# Patient Record
Sex: MALE | Race: WHITE | NOT HISPANIC OR LATINO | Employment: OTHER | URBAN - METROPOLITAN AREA
[De-identification: names, ages, dates, MRNs, and addresses within clinical notes are randomized per-mention and may not be internally consistent; named-entity substitution may affect disease eponyms.]

---

## 2017-01-06 ENCOUNTER — OFFICE VISIT (OUTPATIENT)
Dept: FAMILY MEDICINE CLINIC | Facility: CLINIC | Age: 77
End: 2017-01-06

## 2017-01-06 VITALS
HEIGHT: 71 IN | WEIGHT: 234 LBS | DIASTOLIC BLOOD PRESSURE: 90 MMHG | SYSTOLIC BLOOD PRESSURE: 150 MMHG | OXYGEN SATURATION: 98 % | RESPIRATION RATE: 16 BRPM | BODY MASS INDEX: 32.76 KG/M2 | HEART RATE: 72 BPM | TEMPERATURE: 98.1 F

## 2017-01-06 DIAGNOSIS — I10 ESSENTIAL HYPERTENSION: ICD-10-CM

## 2017-01-06 DIAGNOSIS — G47.00 INSOMNIA, UNSPECIFIED TYPE: Primary | ICD-10-CM

## 2017-01-06 DIAGNOSIS — E78.5 HYPERLIPIDEMIA, UNSPECIFIED HYPERLIPIDEMIA TYPE: ICD-10-CM

## 2017-01-06 PROCEDURE — 99214 OFFICE O/P EST MOD 30 MIN: CPT | Performed by: FAMILY MEDICINE

## 2017-01-06 RX ORDER — ZOLPIDEM TARTRATE 5 MG/1
5 TABLET ORAL NIGHTLY PRN
Qty: 30 TABLET | Refills: 0 | Status: SHIPPED | OUTPATIENT
Start: 2017-01-06 | End: 2017-02-08

## 2017-01-06 NOTE — MR AVS SNAPSHOT
Bernard Resendez   1/6/2017 2:00 PM   Office Visit    Dept Phone:  [phone]   Encounter #:  [id number]    Provider:  Laci Mcnair MD   Department:  [facility]                Your Full Care Plan              Today's Medication Changes          These changes are accurate as of: 1/6/17  2:54 PM.  If you have any questions, ask your nurse or doctor.               Medication(s)that have changed:     zolpidem 5 MG tablet   Commonly known as:  AMBIEN   Take 1 tablet by mouth At Night As Needed for sleep.   What changed:    - medication strength  - how much to take   Changed by:  Laci Mcnair MD         Stop taking medication(s)listed here:     ascorbic acid 1000 MG tablet   Commonly known as:  VITAMIN C   Stopped by:  Laci Mcnair MD           balsalazide 750 MG capsule   Commonly known as:  COLAZAL   Stopped by:  Laci Mcnair MD           traZODone 50 MG tablet   Commonly known as:  DESYREL   Stopped by:  Laci Mcnair MD                Where to Get Your Medications      You can get these medications from any pharmacy     Bring a paper prescription for each of these medications     zolpidem 5 MG tablet                  Your Updated Medication List          This list is accurate as of: 1/6/17  2:54 PM.  Always use your most recent med list.                atenolol 50 MG tablet   Commonly known as:  TENORMIN   Take 1 tablet by mouth Daily.       CALCIUM 500 PO       CENTRUM SILVER ADULT 50+ PO       fish oil 1200 MG capsule capsule       fluocinolone 0.025 % cream   Commonly known as:  SYNALAR       lisinopril 40 MG tablet   Commonly known as:  PRINIVIL,ZESTRIL   Take 1 tablet by mouth Daily.       simvastatin 40 MG tablet   Commonly known as:  ZOCOR       vitamin B-12 1000 MCG tablet   Commonly known as:  CYANOCOBALAMIN       Vitamin D3 1000 UNITS capsule       zolpidem 5 MG tablet   Commonly known as:  AMBIEN   Take 1 tablet by mouth At Night As Needed for  sleep.               You Were Diagnosed With        Codes Comments    Insomnia, unspecified type    -  Primary ICD-10-CM: G47.00  ICD-9-CM: 780.52     Hyperlipidemia, unspecified hyperlipidemia type     ICD-10-CM: E78.5  ICD-9-CM: 272.4     Essential hypertension     ICD-10-CM: I10  ICD-9-CM: 401.9       Instructions     None    Patient Instructions History      Upcoming Appointments     Visit Type Date Time Department    [facility] 2017  2:00 PM [facility]    [facility]ICE VISIT 2017  1:40 PM [facility]      [employer] Signup     [facility] [employer] allows you to send messages to your doctor, view your test results, renew your prescriptions, schedule appointments, and more. To sign up, go to [employer] and click on the Sign Up Now link in the New User? box. Enter your [employer] Activation Code exactly as it appears below along with the last four digits of your Social Security Number and your Date of Birth () to complete the sign-up process. If you do not sign up before the expiration date, you must request a new code.    [employer] Activation Code: [email or web address]  Expires: 2017  2:52 PM    If you have questions, you can email [email or web address] or call [phone] to talk to our [employer] staff. Remember, [employer] is NOT to be used for urgent needs. For medical emergencies, dial 911.               Other Info from Your Visit           Your Appointments     2017  1:40 PM EST   Office Visit with Laci Mcnair MD   [facility] (--)    [address] [address] [phone]           Arrive 15 minutes prior to appointment.              Allergies     Ciprofloxacin        Reason for Visit     Establish Care Pt here to establish care, transferring from Dr. Rae office. Needs medication refills      Vital Signs     Blood Pressure Pulse Temperature Respirations Height Weight    150/90 72 98.1 °F (36.7  "°C) (Oral) 16 71\" (180.3 cm) 234 lb (106 kg)    Oxygen Saturation Body Mass Index Smoking Status             98% 32.64 kg/m2 Former Smoker         Problems and Diagnoses Noted     High cholesterol or triglycerides    High blood pressure    Difficulty falling or staying asleep        "

## 2017-01-06 NOTE — PROGRESS NOTES
Subjective   Bernard Resendez is a 76 y.o. male.     Chief Complaint   Patient presents with   • Establish Care     Pt here to establish care, transferring from Dr. Rae office. Needs medication refills       HPI     Insomnia: Patient states he's been taking zolpidem 10 mg nightly for about one year.  States that he tolerates medications well and has no adverse side effects.  States that he has tried trazodone and melatonin in the past with limited results.  He has visited a sleep medicine specialist. He states that his room is quite dark and comfortable.  He tries to avoid screening time before going to sleep.    Hypertension: Stable and well controlled on lisinopril and atenolol.  No adverse side effects noted.  Diet and exercise are at goal.    Hyperlipidemia: Stable and well controlled on simvastatin 40 mg.  No adverse side effects noted.    The following portions of the patient's history were reviewed and updated as appropriate: allergies, current medications, past family history, past medical history, past social history, past surgical history and problem list.    Review of Systems   All other systems reviewed and are negative.      Objective  Vitals:    01/06/17 1405   BP: 150/90   Pulse: 72   Resp: 16   Temp: 98.1 °F (36.7 °C)   SpO2: 98%        Physical Exam   Constitutional: He is oriented to person, place, and time. He appears well-developed and well-nourished. No distress.   HENT:   Head: Normocephalic and atraumatic.   Right Ear: External ear normal.   Left Ear: External ear normal.   Nose: Nose normal.   Mouth/Throat: Oropharynx is clear and moist.   Eyes: Conjunctivae and EOM are normal. Pupils are equal, round, and reactive to light. Right eye exhibits no discharge. Left eye exhibits no discharge. No scleral icterus.   Neck: Normal range of motion. Neck supple.   Cardiovascular: Normal rate, regular rhythm and normal heart sounds.  Exam reveals no friction rub.    No murmur heard.  Pulmonary/Chest:  Effort normal and breath sounds normal. No respiratory distress. He has no wheezes. He has no rales.   Abdominal: Soft. Bowel sounds are normal. He exhibits no distension. There is no tenderness. There is no rebound and no guarding.   Lymphadenopathy:     He has no cervical adenopathy.   Neurological: He is alert and oriented to person, place, and time.   Skin: Skin is warm and dry. He is not diaphoretic.   Nursing note and vitals reviewed.        Current Outpatient Prescriptions:   •  atenolol (TENORMIN) 50 MG tablet, Take 1 tablet by mouth Daily., Disp: 90 tablet, Rfl: 1  •  Calcium-Magnesium-Vitamin D (CALCIUM 500 PO), Take 2 tablets by mouth daily., Disp: , Rfl:   •  Cholecalciferol (VITAMIN D3) 1000 UNITS capsule, Take by mouth daily., Disp: , Rfl:   •  fluocinolone (SYNALAR) 0.025 % cream, , Disp: , Rfl:   •  lisinopril (PRINIVIL,ZESTRIL) 40 MG tablet, Take 1 tablet by mouth Daily., Disp: 90 tablet, Rfl: 1  •  Multiple Vitamins-Minerals (CENTRUM SILVER ADULT 50+ PO), Take by mouth., Disp: , Rfl:   •  Omega-3 Fatty Acids (FISH OIL) 1200 MG capsule capsule, Take by mouth., Disp: , Rfl:   •  simvastatin (ZOCOR) 40 MG tablet, , Disp: , Rfl:   •  vitamin B-12 (CYANOCOBALAMIN) 1000 MCG tablet, Take by mouth., Disp: , Rfl:   •  zolpidem (AMBIEN) 5 MG tablet, Take 1 tablet by mouth At Night As Needed for sleep., Disp: 30 tablet, Rfl: 0    Procedures    Lab Results (most recent)     None          Assessment/Plan   Bernard was seen today for establish care.    Diagnoses and all orders for this visit:    Insomnia, unspecified type  -     zolpidem (AMBIEN) 5 MG tablet; Take 1 tablet by mouth At Night As Needed for sleep.    Hyperlipidemia, unspecified hyperlipidemia type    Essential hypertension    Extensive conversation had with the patient regarding the risks of zolpidem at the 10 mg dose.  I advised the patient that 10 mg is an appropriate dose for his age.  Patient will try 5 mg in combination with melatonin 10 mg.   Advised patient that this up over-the-counter. A BLACK was run which showed no abnormalities.  A controlled substance contract was reviewed and signed.    Hypertension and hyperlipidemia are currently stable.  Continue current medical regimen.    Return in about 4 weeks (around 2/3/2017) for Recheck.      Laci Mcnair MD

## 2017-02-06 ENCOUNTER — OFFICE VISIT (OUTPATIENT)
Dept: FAMILY MEDICINE CLINIC | Facility: CLINIC | Age: 77
End: 2017-02-06

## 2017-02-06 VITALS
BODY MASS INDEX: 32.62 KG/M2 | HEIGHT: 71 IN | SYSTOLIC BLOOD PRESSURE: 142 MMHG | WEIGHT: 233 LBS | HEART RATE: 56 BPM | TEMPERATURE: 98.2 F | DIASTOLIC BLOOD PRESSURE: 90 MMHG | RESPIRATION RATE: 17 BRPM | OXYGEN SATURATION: 98 %

## 2017-02-06 DIAGNOSIS — E78.5 HYPERLIPIDEMIA, UNSPECIFIED HYPERLIPIDEMIA TYPE: Primary | ICD-10-CM

## 2017-02-06 DIAGNOSIS — G47.00 INSOMNIA, UNSPECIFIED TYPE: ICD-10-CM

## 2017-02-06 PROCEDURE — 99214 OFFICE O/P EST MOD 30 MIN: CPT | Performed by: FAMILY MEDICINE

## 2017-02-06 RX ORDER — BALSALAZIDE DISODIUM 750 MG/1
CAPSULE ORAL
Qty: 270 CAPSULE | Refills: 10 | OUTPATIENT
Start: 2017-02-06

## 2017-02-06 NOTE — PROGRESS NOTES
Subjective   Bernard Resendez is a 76 y.o. male.     Chief Complaint   Patient presents with   • Follow-up     one month       HPI     Patient here to follow-up on insomnia.  At her last visit patient was decreased from 10 mg to 5 mg of Ambien.  He was also started on melatonin supplement.  He states that the first week was actually pretty rough as far as getting a good night's sleep.  After about a week he did start to have better sleep.  He states now that he'll sleep for maybe 4-5 hours and then wake up.  He is not having any other adverse side effects.    Hyperlipidemia: Patient is in need of refills of his cholesterol-lowering medication simvastatin 40 mg daily.  His last lipid panel showed all levels within normal limits.  He does try to eat a healthy diet and stays active physically.  No adverse side effects are noted from this medication.    The following portions of the patient's history were reviewed and updated as appropriate: allergies, current medications, past family history, past medical history, past social history, past surgical history and problem list.    Review of Systems   Psychiatric/Behavioral: Positive for sleep disturbance.   All other systems reviewed and are negative.      Objective  Vitals:    02/06/17 1328   BP: 142/90   Pulse: 56   Resp: 17   Temp: 98.2 °F (36.8 °C)   SpO2: 98%        Physical Exam   Constitutional: He is oriented to person, place, and time. He appears well-developed and well-nourished. No distress.   HENT:   Head: Normocephalic and atraumatic.   Right Ear: External ear normal.   Left Ear: External ear normal.   Nose: Nose normal.   Mouth/Throat: Oropharynx is clear and moist.   Eyes: Conjunctivae and EOM are normal. Pupils are equal, round, and reactive to light. Right eye exhibits no discharge. Left eye exhibits no discharge. No scleral icterus.   Cardiovascular: Normal rate, regular rhythm and normal heart sounds.    Pulmonary/Chest: Effort normal and breath sounds  normal. No respiratory distress. He has no wheezes. He has no rales.   Abdominal: Soft. Bowel sounds are normal. He exhibits no distension. There is no tenderness.   Neurological: He is alert and oriented to person, place, and time.   Skin: Skin is warm and dry. He is not diaphoretic.   Nursing note and vitals reviewed.        Current Outpatient Prescriptions:   •  atenolol (TENORMIN) 50 MG tablet, Take 1 tablet by mouth Daily., Disp: 90 tablet, Rfl: 1  •  Calcium-Magnesium-Vitamin D (CALCIUM 500 PO), Take 2 tablets by mouth daily., Disp: , Rfl:   •  Cholecalciferol (VITAMIN D3) 1000 UNITS capsule, Take by mouth daily., Disp: , Rfl:   •  fluocinolone (SYNALAR) 0.025 % cream, , Disp: , Rfl:   •  lisinopril (PRINIVIL,ZESTRIL) 40 MG tablet, Take 1 tablet by mouth Daily., Disp: 90 tablet, Rfl: 1  •  Multiple Vitamins-Minerals (CENTRUM SILVER ADULT 50+ PO), Take by mouth., Disp: , Rfl:   •  Omega-3 Fatty Acids (FISH OIL) 1200 MG capsule capsule, Take by mouth., Disp: , Rfl:   •  simvastatin (ZOCOR) 40 MG tablet, , Disp: , Rfl:   •  vitamin B-12 (CYANOCOBALAMIN) 1000 MCG tablet, Take by mouth., Disp: , Rfl:   •  zolpidem (AMBIEN) 5 MG tablet, Take 1 tablet by mouth At Night As Needed for sleep., Disp: 30 tablet, Rfl: 0  •  Suvorexant 10 MG tablet, Take 10 mg by mouth every night at bedtime., Disp: 10 tablet, Rfl: 0  •  Suvorexant 20 MG tablet, Take 20 mg by mouth every night at bedtime., Disp: 10 tablet, Rfl: 0    Procedures    Lab Results (most recent)     None          Assessment/Plan   Bernard was seen today for follow-up.    Diagnoses and all orders for this visit:    Hyperlipidemia, unspecified hyperlipidemia type    Insomnia, unspecified type  -     Suvorexant 10 MG tablet; Take 10 mg by mouth every night at bedtime.  -     Suvorexant 20 MG tablet; Take 20 mg by mouth every night at bedtime.    After extensive conversation with the patient regarding the continued potential side effects of use of Ambien patient and I  have agreed to trial Belsomra.  Patient was given to 10 day trials of 10 mg and 20 mg.  I advised the patient to hold off on taking his Ambien for 2 nights before starting the Belsomra. If Belsomra does not improve patient's insomnia Will switch back to Ambien.    Due to the excellent lipid profile done this last August and the patient's lack of history of cardiovascular disease or stroke is reasonable to trial off of statin therapy at this time.  Patient will hold statin for the next 6 months and will recheck a lipid panel in August.    Return in about 4 weeks (around 3/6/2017) for Recheck.      Laci Mcnair MD

## 2017-02-07 ENCOUNTER — TELEPHONE (OUTPATIENT)
Dept: GASTROENTEROLOGY | Facility: CLINIC | Age: 77
End: 2017-02-07

## 2017-02-07 RX ORDER — BALSALAZIDE DISODIUM 750 MG/1
2250 CAPSULE ORAL 3 TIMES DAILY
Qty: 9 CAPSULE | Refills: 0 | Status: SHIPPED | OUTPATIENT
Start: 2017-02-07 | End: 2017-02-08 | Stop reason: SDUPTHER

## 2017-02-07 NOTE — TELEPHONE ENCOUNTER
Patient called, advised we can order enough Balsalazide till his appointment tomorrow.  Patient states he has enough to last till his appointment.  He verb understanding.

## 2017-02-07 NOTE — TELEPHONE ENCOUNTER
----- Message from Buddy Winn sent at 2/7/2017 10:05 AM EST -----  Regarding: REFILL   Contact: [phone]  DR. ROONEY- PT CALLED NEED A REFILL FOR BALASALAZIDE 750 MG ..     [facility]# [phone]

## 2017-02-08 ENCOUNTER — TELEPHONE (OUTPATIENT)
Dept: FAMILY MEDICINE CLINIC | Facility: CLINIC | Age: 77
End: 2017-02-08

## 2017-02-08 ENCOUNTER — OFFICE VISIT (OUTPATIENT)
Dept: GASTROENTEROLOGY | Facility: CLINIC | Age: 77
End: 2017-02-08

## 2017-02-08 VITALS
DIASTOLIC BLOOD PRESSURE: 88 MMHG | SYSTOLIC BLOOD PRESSURE: 146 MMHG | HEIGHT: 71 IN | BODY MASS INDEX: 32.79 KG/M2 | WEIGHT: 234.2 LBS

## 2017-02-08 DIAGNOSIS — K63.5 COLON POLYPS: ICD-10-CM

## 2017-02-08 DIAGNOSIS — K51.90 ULCERATIVE COLITIS WITHOUT COMPLICATIONS, UNSPECIFIED LOCATION (HCC): Primary | ICD-10-CM

## 2017-02-08 LAB
ALBUMIN SERPL-MCNC: 3.9 G/DL (ref 3.5–5.2)
ALBUMIN/GLOB SERPL: 1.6 G/DL
ALP SERPL-CCNC: 60 U/L (ref 39–117)
ALT SERPL-CCNC: 9 U/L (ref 1–41)
AST SERPL-CCNC: 15 U/L (ref 1–40)
BASOPHILS # BLD AUTO: 0.03 10*3/MM3 (ref 0–0.2)
BASOPHILS NFR BLD AUTO: 0.4 % (ref 0–1.5)
BILIRUB SERPL-MCNC: 0.4 MG/DL (ref 0.1–1.2)
BUN SERPL-MCNC: 19 MG/DL (ref 8–23)
BUN/CREAT SERPL: 16.2 (ref 7–25)
CALCIUM SERPL-MCNC: 9.7 MG/DL (ref 8.6–10.5)
CHLORIDE SERPL-SCNC: 105 MMOL/L (ref 98–107)
CO2 SERPL-SCNC: 29.1 MMOL/L (ref 22–29)
CREAT SERPL-MCNC: 1.17 MG/DL (ref 0.76–1.27)
EOSINOPHIL # BLD AUTO: 0.21 10*3/MM3 (ref 0–0.7)
EOSINOPHIL NFR BLD AUTO: 2.7 % (ref 0.3–6.2)
ERYTHROCYTE [DISTWIDTH] IN BLOOD BY AUTOMATED COUNT: 13.4 % (ref 11.5–14.5)
GLOBULIN SER CALC-MCNC: 2.5 GM/DL
GLUCOSE SERPL-MCNC: 81 MG/DL (ref 65–99)
HCT VFR BLD AUTO: 43.8 % (ref 40.4–52.2)
HGB BLD-MCNC: 14.8 G/DL (ref 13.7–17.6)
IMM GRANULOCYTES # BLD: 0 10*3/MM3 (ref 0–0.03)
IMM GRANULOCYTES NFR BLD: 0 % (ref 0–0.5)
LYMPHOCYTES # BLD AUTO: 1.82 10*3/MM3 (ref 0.9–4.8)
LYMPHOCYTES NFR BLD AUTO: 23 % (ref 19.6–45.3)
MCH RBC QN AUTO: 29.5 PG (ref 27–32.7)
MCHC RBC AUTO-ENTMCNC: 33.8 G/DL (ref 32.6–36.4)
MCV RBC AUTO: 87.4 FL (ref 79.8–96.2)
MONOCYTES # BLD AUTO: 0.54 10*3/MM3 (ref 0.2–1.2)
MONOCYTES NFR BLD AUTO: 6.8 % (ref 5–12)
NEUTROPHILS # BLD AUTO: 5.31 10*3/MM3 (ref 1.9–8.1)
NEUTROPHILS NFR BLD AUTO: 67.1 % (ref 42.7–76)
PLATELET # BLD AUTO: 212 10*3/MM3 (ref 140–500)
POTASSIUM SERPL-SCNC: 5.2 MMOL/L (ref 3.5–5.2)
PROT SERPL-MCNC: 6.4 G/DL (ref 6–8.5)
RBC # BLD AUTO: 5.01 10*6/MM3 (ref 4.6–6)
SODIUM SERPL-SCNC: 144 MMOL/L (ref 136–145)
WBC # BLD AUTO: 7.91 10*3/MM3 (ref 4.5–10.7)

## 2017-02-08 PROCEDURE — 99213 OFFICE O/P EST LOW 20 MIN: CPT | Performed by: NURSE PRACTITIONER

## 2017-02-08 RX ORDER — BALSALAZIDE DISODIUM 750 MG/1
2250 CAPSULE ORAL 3 TIMES DAILY
Qty: 270 CAPSULE | Refills: 11 | Status: SHIPPED | OUTPATIENT
Start: 2017-02-08 | End: 2018-02-01 | Stop reason: SDUPTHER

## 2017-02-08 NOTE — TELEPHONE ENCOUNTER
I spoke with the patient on the phone regarding the use of Belsomra.  He is going to try to see if it works better than the Ambien over the course of this trial.  We'll evaluate early next week.

## 2017-02-08 NOTE — PROGRESS NOTES
Subjective     Bernard Resendez is a 76 y.o. male, patient of Dr. Gomes.  Followed for ulcerative colitis, duration of 40+ years.  Stable on current medication, Colazal.  Insurance would no longer pay for LiaGoodClico.  Has been doing well.  Has 1-2 BMs/day, soft formed stools  Denies bloody/mucous stools and bright red rectal bleeding.  Denies abdominal pain and weight loss.  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  Had flu vaccine this year.    Ulcerative Colitis   This is a chronic problem. The current episode started more than 1 year ago. Associated symptoms include arthralgias (history of arthritis, no changes.). Pertinent negatives include no abdominal pain, anorexia, change in bowel habit, chest pain, chills, congestion, coughing, fatigue, fever, headaches, joint swelling, myalgias, nausea, rash, sore throat, urinary symptoms, vertigo, visual change or vomiting. Exacerbated by: dairy products. The treatment provided significant relief.        Vitals:    02/08/17 1059   BP: 146/88         Current Outpatient Prescriptions:   •  atenolol (TENORMIN) 50 MG tablet, Take 1 tablet by mouth Daily., Disp: 90 tablet, Rfl: 1  •  balsalazide (COLAZAL) 750 MG capsule, Take 3 capsules by mouth 3 (Three) Times a Day., Disp: 270 capsule, Rfl: 11  •  Calcium-Magnesium-Vitamin D (CALCIUM 500 PO), Take 2 tablets by mouth daily., Disp: , Rfl:   •  Cholecalciferol (VITAMIN D3) 1000 UNITS capsule, Take by mouth daily., Disp: , Rfl:   •  fluocinolone (SYNALAR) 0.025 % cream, , Disp: , Rfl:   •  lisinopril (PRINIVIL,ZESTRIL) 40 MG tablet, Take 1 tablet by mouth Daily., Disp: 90 tablet, Rfl: 1  •  Multiple Vitamins-Minerals (CENTRUM SILVER ADULT 50+ PO), Take by mouth., Disp: , Rfl:   •  Omega-3 Fatty Acids (FISH OIL) 1200 MG capsule capsule, Take by mouth.,  Disp: , Rfl:   •  simvastatin (ZOCOR) 40 MG tablet, , Disp: , Rfl:   •  Suvorexant 10 MG tablet, Take 10 mg by mouth every night at bedtime., Disp: 10 tablet, Rfl: 0  •  vitamin B-12 (CYANOCOBALAMIN) 1000 MCG tablet, Take by mouth., Disp: , Rfl:   •  Suvorexant 20 MG tablet, Take 20 mg by mouth every night at bedtime., Disp: 10 tablet, Rfl: 0    Allergies   Allergen Reactions   • Ciprofloxacin        Past Medical History   Diagnosis Date   • Acute renal failure    • Diverticulosis    • Hearing difficulty    • History of shingles    • Hyperlipidemia    • Hypertension    • Hypogonadism in male    • Insomnia    • Nephrolithiasis    • Osteopenia    • Renal insufficiency    • Ulcerative colitis        Past Surgical History   Procedure Laterality Date   • Eye surgery       cataract   • Tumor excision       rectal tumor   • Kidney stone surgery     • Colonoscopy  06/11/2015     NBIH, actuve colitis w/cryptitis, rare crypt abscess & superficial erosion, hyperplastic polyp       Family History   Problem Relation Age of Onset   • Hypertension Mother    • Stroke Mother    • Asthma Sister    • Ulcerative colitis Sister        Social History     Social History   • Marital status:      Spouse name: N/A   • Number of children: N/A   • Years of education: N/A     Social History Main Topics   • Smoking status: Former Smoker     Packs/day: 0.25     Types: Cigarettes     Quit date: 1981   • Smokeless tobacco: Never Used   • Alcohol use Yes      Comment: socially   • Drug use: No   • Sexual activity: Not Asked     Other Topics Concern   • None     Social History Narrative       The following portions of the patient's history were reviewed and updated as appropriate: allergies, current medications, past family history, past medical history, past social history, past surgical history and problem list.    Review of Systems   Constitutional: Negative for activity change, appetite change, chills, fatigue, fever and unexpected weight  change.   HENT: Negative for congestion, sore throat and trouble swallowing.         Denies oral ulcers.   Eyes: Negative.    Respiratory: Negative for cough, chest tightness and shortness of breath.    Cardiovascular: Negative for chest pain and leg swelling.   Gastrointestinal: Negative for abdominal distention, abdominal pain, anal bleeding, anorexia, blood in stool, change in bowel habit, constipation, diarrhea, nausea and vomiting.   Genitourinary: Negative for dysuria.   Musculoskeletal: Positive for arthralgias (history of arthritis, no changes.). Negative for gait problem, joint swelling and myalgias.   Skin: Negative for color change, pallor and rash.   Neurological: Negative for dizziness, vertigo, syncope, light-headedness and headaches.       Objective     Physical Exam   Constitutional: He is oriented to person, place, and time. He appears well-developed and well-nourished. No distress.   HENT:   Head: Normocephalic and atraumatic.   Mouth/Throat: Oropharynx is clear and moist.   Eyes: Conjunctivae are normal. Right eye exhibits no discharge. Left eye exhibits no discharge. No scleral icterus.   Neck: Neck supple.   Cardiovascular: Normal rate, regular rhythm, normal heart sounds and intact distal pulses.    Pulmonary/Chest: Effort normal and breath sounds normal. No respiratory distress.   Abdominal: Soft. Bowel sounds are normal. He exhibits no distension and no mass. There is no tenderness. There is no rebound and no guarding.   Musculoskeletal: He exhibits no deformity.   Lymphadenopathy:     He has no cervical adenopathy.   Neurological: He is alert and oriented to person, place, and time.   Skin: Skin is warm and dry. No rash noted. No pallor.   Psychiatric: He has a normal mood and affect. His behavior is normal. Judgment and thought content normal.   Nursing note and vitals reviewed.      Assessment/Plan     Bernard was seen today for ulcerative colitis.    Diagnoses and all orders for this  visit:    Ulcerative colitis without complications, unspecified location  -     CBC & Differential  -     Comprehensive Metabolic Panel    Colon polyps    Other orders  -     balsalazide (COLAZAL) 750 MG capsule; Take 3 capsules by mouth 3 (Three) Times a Day.     assessment/plan  1.  Ulcerative colitis - continue with Colazal.  In colonoscopy report, Dr. Gifford recommended  colonoscopy in 2 years(6/2017), patient disagrees with this.  Reports he was told 3 years.  Will check with Dr. Gifford and let patient know.  Check above labs and call patient with results.  2.  Colon polyps - will let patient know when next colonoscopy will be scheduled after talking with Dr. Gifford.    Discussed plan of care with patient. Verbalizes understanding and agrees with plan.  Advised to call office for any concerns or questions regarding today's visit and if any GI problems should arise.

## 2017-02-10 ENCOUNTER — TELEPHONE (OUTPATIENT)
Dept: GASTROENTEROLOGY | Facility: CLINIC | Age: 77
End: 2017-02-10

## 2017-02-10 NOTE — TELEPHONE ENCOUNTER
Spoke with patient yesterday regarding colonoscopy surveillance.  Informed patient that I talked with Dr. Gifford and he is suggesting a colonoscopy every 2 years due to the length of time he has had ulcerative colitis.  Agrees to having the colonoscopy but not before June of this year.  Will put him in colonoscopy recall.

## 2017-02-14 ENCOUNTER — TELEPHONE (OUTPATIENT)
Dept: GASTROENTEROLOGY | Facility: CLINIC | Age: 77
End: 2017-02-14

## 2017-02-14 NOTE — TELEPHONE ENCOUNTER
----- Message from ANTELMO Guevara sent at 2/9/2017  7:08 AM EST -----  Please let patient know his labs looked good.

## 2017-02-18 ENCOUNTER — RESULTS ENCOUNTER (OUTPATIENT)
Dept: INTERNAL MEDICINE | Facility: CLINIC | Age: 77
End: 2017-02-18

## 2017-02-18 DIAGNOSIS — I10 ESSENTIAL HYPERTENSION: ICD-10-CM

## 2017-02-18 DIAGNOSIS — N18.30 CHRONIC KIDNEY DISEASE, STAGE III (MODERATE) (HCC): ICD-10-CM

## 2017-02-18 DIAGNOSIS — E78.5 HYPERLIPIDEMIA: ICD-10-CM

## 2017-02-22 ENCOUNTER — TELEPHONE (OUTPATIENT)
Dept: FAMILY MEDICINE CLINIC | Facility: CLINIC | Age: 77
End: 2017-02-22

## 2017-02-22 RX ORDER — ZOLPIDEM TARTRATE 5 MG/1
5 TABLET ORAL NIGHTLY PRN
Qty: 30 TABLET | Refills: 3 | OUTPATIENT
Start: 2017-02-22 | End: 2017-04-25 | Stop reason: SDUPTHER

## 2017-02-22 NOTE — TELEPHONE ENCOUNTER
Please call in Ambien 5 mg 1 tab by mouth daily at bedtime #30with 3 refills to the patient's pharmacy.  Thank you.

## 2017-03-10 ENCOUNTER — TELEPHONE (OUTPATIENT)
Dept: FAMILY MEDICINE CLINIC | Facility: CLINIC | Age: 77
End: 2017-03-10

## 2017-03-10 DIAGNOSIS — I10 ESSENTIAL HYPERTENSION: ICD-10-CM

## 2017-03-10 RX ORDER — ATENOLOL 50 MG/1
50 TABLET ORAL DAILY
Qty: 90 TABLET | Refills: 1 | Status: SHIPPED | OUTPATIENT
Start: 2017-03-10 | End: 2017-11-27 | Stop reason: SDUPTHER

## 2017-03-10 RX ORDER — LISINOPRIL 40 MG/1
40 TABLET ORAL DAILY
Qty: 90 TABLET | Refills: 1 | Status: SHIPPED | OUTPATIENT
Start: 2017-03-10 | End: 2017-11-27 | Stop reason: SDUPTHER

## 2017-03-10 NOTE — TELEPHONE ENCOUNTER
Patient left voice message today at 9:56am    Patient requesting Rx for 90 day supply for Lisinopril 40 mg and Atenolol 50 mg. Will you approve?

## 2017-04-04 ENCOUNTER — TELEPHONE (OUTPATIENT)
Dept: FAMILY MEDICINE CLINIC | Facility: CLINIC | Age: 77
End: 2017-04-04

## 2017-04-04 NOTE — TELEPHONE ENCOUNTER
Patient called and asked if Dr. Mcnair can write me a letter saying that he is taking Ambien 5 mg and it works just fine for him and we tried other medications did not help him and he has been taking it for couple years . He needs this to be faxed to his Insurance they required that yearly.   Insurance ph# [phone]  Fax # [phone]

## 2017-04-25 RX ORDER — ZOLPIDEM TARTRATE 5 MG/1
5 TABLET ORAL NIGHTLY PRN
Qty: 30 TABLET | Refills: 5 | OUTPATIENT
Start: 2017-04-25 | End: 2017-06-22 | Stop reason: SDUPTHER

## 2017-06-05 ENCOUNTER — PREP FOR SURGERY (OUTPATIENT)
Dept: OTHER | Facility: HOSPITAL | Age: 77
End: 2017-06-05

## 2017-06-05 DIAGNOSIS — K51.00 ULCERATIVE PANCOLITIS WITHOUT COMPLICATION (HCC): Primary | ICD-10-CM

## 2017-06-05 RX ORDER — SODIUM CHLORIDE 0.9 % (FLUSH) 0.9 %
1-10 SYRINGE (ML) INJECTION AS NEEDED
Status: CANCELLED | OUTPATIENT
Start: 2017-08-08

## 2017-06-22 ENCOUNTER — TELEPHONE (OUTPATIENT)
Dept: FAMILY MEDICINE CLINIC | Facility: CLINIC | Age: 77
End: 2017-06-22

## 2017-06-22 RX ORDER — ZOLPIDEM TARTRATE 5 MG/1
5 TABLET ORAL NIGHTLY PRN
Qty: 30 TABLET | Refills: 0 | OUTPATIENT
Start: 2017-06-22 | End: 2017-07-11 | Stop reason: SDUPTHER

## 2017-07-11 ENCOUNTER — OFFICE VISIT (OUTPATIENT)
Dept: FAMILY MEDICINE CLINIC | Facility: CLINIC | Age: 77
End: 2017-07-11

## 2017-07-11 VITALS
HEART RATE: 58 BPM | WEIGHT: 229 LBS | HEIGHT: 71 IN | DIASTOLIC BLOOD PRESSURE: 74 MMHG | RESPIRATION RATE: 14 BRPM | BODY MASS INDEX: 32.06 KG/M2 | TEMPERATURE: 98.4 F | OXYGEN SATURATION: 98 % | SYSTOLIC BLOOD PRESSURE: 136 MMHG

## 2017-07-11 DIAGNOSIS — I10 ESSENTIAL HYPERTENSION: ICD-10-CM

## 2017-07-11 DIAGNOSIS — M17.12 PRIMARY OSTEOARTHRITIS OF LEFT KNEE: ICD-10-CM

## 2017-07-11 DIAGNOSIS — G47.00 INSOMNIA, UNSPECIFIED TYPE: Primary | ICD-10-CM

## 2017-07-11 DIAGNOSIS — E78.5 HYPERLIPIDEMIA, UNSPECIFIED HYPERLIPIDEMIA TYPE: ICD-10-CM

## 2017-07-11 DIAGNOSIS — N18.30 CHRONIC KIDNEY DISEASE, STAGE III (MODERATE) (HCC): ICD-10-CM

## 2017-07-11 DIAGNOSIS — R73.09 ELEVATED GLUCOSE: ICD-10-CM

## 2017-07-11 LAB
ALBUMIN SERPL-MCNC: 3.9 G/DL (ref 3.5–5.2)
ALBUMIN/GLOB SERPL: 1.5 G/DL
ALP SERPL-CCNC: 53 U/L (ref 39–117)
ALT SERPL-CCNC: 9 U/L (ref 1–41)
AST SERPL-CCNC: 16 U/L (ref 1–40)
BASOPHILS # BLD AUTO: 0.02 10*3/MM3 (ref 0–0.2)
BASOPHILS NFR BLD AUTO: 0.3 % (ref 0–1.5)
BILIRUB SERPL-MCNC: 0.4 MG/DL (ref 0.1–1.2)
BUN SERPL-MCNC: 18 MG/DL (ref 8–23)
BUN/CREAT SERPL: 16.1 (ref 7–25)
CALCIUM SERPL-MCNC: 8.9 MG/DL (ref 8.6–10.5)
CHLORIDE SERPL-SCNC: 101 MMOL/L (ref 98–107)
CHOLEST SERPL-MCNC: 207 MG/DL (ref 0–200)
CO2 SERPL-SCNC: 24.7 MMOL/L (ref 22–29)
CREAT SERPL-MCNC: 1.12 MG/DL (ref 0.76–1.27)
EOSINOPHIL # BLD AUTO: 0.23 10*3/MM3 (ref 0–0.7)
EOSINOPHIL NFR BLD AUTO: 3 % (ref 0.3–6.2)
ERYTHROCYTE [DISTWIDTH] IN BLOOD BY AUTOMATED COUNT: 13.2 % (ref 11.5–14.5)
GLOBULIN SER CALC-MCNC: 2.6 GM/DL
GLUCOSE SERPL-MCNC: 91 MG/DL (ref 65–99)
HBA1C MFR BLD: 5.02 % (ref 4.8–5.6)
HCT VFR BLD AUTO: 43.7 % (ref 40.4–52.2)
HDLC SERPL-MCNC: 46 MG/DL (ref 40–60)
HGB BLD-MCNC: 14.8 G/DL (ref 13.7–17.6)
IMM GRANULOCYTES # BLD: 0 10*3/MM3 (ref 0–0.03)
IMM GRANULOCYTES NFR BLD: 0 % (ref 0–0.5)
LDLC SERPL CALC-MCNC: 134 MG/DL (ref 0–100)
LYMPHOCYTES # BLD AUTO: 1.84 10*3/MM3 (ref 0.9–4.8)
LYMPHOCYTES NFR BLD AUTO: 24.1 % (ref 19.6–45.3)
MCH RBC QN AUTO: 30.1 PG (ref 27–32.7)
MCHC RBC AUTO-ENTMCNC: 33.9 G/DL (ref 32.6–36.4)
MCV RBC AUTO: 89 FL (ref 79.8–96.2)
MONOCYTES # BLD AUTO: 0.37 10*3/MM3 (ref 0.2–1.2)
MONOCYTES NFR BLD AUTO: 4.8 % (ref 5–12)
NEUTROPHILS # BLD AUTO: 5.19 10*3/MM3 (ref 1.9–8.1)
NEUTROPHILS NFR BLD AUTO: 67.8 % (ref 42.7–76)
PLATELET # BLD AUTO: 209 10*3/MM3 (ref 140–500)
POTASSIUM SERPL-SCNC: 4.8 MMOL/L (ref 3.5–5.2)
PROT SERPL-MCNC: 6.5 G/DL (ref 6–8.5)
RBC # BLD AUTO: 4.91 10*6/MM3 (ref 4.6–6)
SODIUM SERPL-SCNC: 138 MMOL/L (ref 136–145)
TRIGL SERPL-MCNC: 134 MG/DL (ref 0–150)
VLDLC SERPL CALC-MCNC: 26.8 MG/DL (ref 5–40)
WBC # BLD AUTO: 7.65 10*3/MM3 (ref 4.5–10.7)

## 2017-07-11 PROCEDURE — 99214 OFFICE O/P EST MOD 30 MIN: CPT | Performed by: FAMILY MEDICINE

## 2017-07-11 RX ORDER — ZOLPIDEM TARTRATE 5 MG/1
5 TABLET ORAL NIGHTLY PRN
Qty: 30 TABLET | Refills: 5 | OUTPATIENT
Start: 2017-07-11 | End: 2017-07-11 | Stop reason: SDUPTHER

## 2017-07-11 RX ORDER — CYCLOBENZAPRINE HCL 5 MG
5 TABLET ORAL 2 TIMES DAILY PRN
Qty: 30 TABLET | Refills: 0 | Status: SHIPPED | OUTPATIENT
Start: 2017-07-11 | End: 2017-07-12

## 2017-07-11 RX ORDER — ZOLPIDEM TARTRATE 5 MG/1
5 TABLET ORAL NIGHTLY PRN
Qty: 30 TABLET | Refills: 5 | Status: SHIPPED | OUTPATIENT
Start: 2017-07-11 | End: 2018-02-05 | Stop reason: SDUPTHER

## 2017-07-11 NOTE — PROGRESS NOTES
Subjective   Bernard Resendez is a 77 y.o. male.     Chief Complaint   Patient presents with   • Follow-up     Patient needs a check up andlab work done.    • Arthritis     Patient is having knee and hip pain lately.        HPI     Patient here for follow-up on chronic medical problems.  History of insomnia which is currently stable and well controlled with Ambien 5 mg nightly.  We've tried other medications for this with no benefit.  Patient would like to continue taking the Ambien as prescribed.    History of osteoarthritis of the left knee.  This is been flaring up on and off over the course last few years.  Patient usually takes over-the-counter medications for this.  It is been acting up.    Stable history of hyperlipidemia, hypertension, and chronic kidney disease.  He's also had some elevated blood sugars.  These need to be evaluated.    The following portions of the patient's history were reviewed and updated as appropriate: allergies, current medications, past family history, past medical history, past social history, past surgical history and problem list.    Review of Systems   Musculoskeletal: Positive for joint swelling.   All other systems reviewed and are negative.      Objective  Vitals:    07/11/17 1303   BP: 136/74   Pulse: 58   Resp: 14   Temp: 98.4 °F (36.9 °C)   SpO2: 98%        Physical Exam   Constitutional: He is oriented to person, place, and time. He appears well-developed and well-nourished. No distress.   HENT:   Head: Normocephalic and atraumatic.   Right Ear: External ear normal.   Left Ear: External ear normal.   Nose: Nose normal.   Mouth/Throat: Oropharynx is clear and moist.   Eyes: Conjunctivae and EOM are normal. Pupils are equal, round, and reactive to light. Right eye exhibits no discharge. Left eye exhibits no discharge. No scleral icterus.   Cardiovascular: Normal rate, regular rhythm and normal heart sounds.    Pulmonary/Chest: Effort normal and breath sounds normal. No  respiratory distress. He has no wheezes. He has no rales.   Abdominal: Soft. Bowel sounds are normal. He exhibits no distension. There is no tenderness.   Musculoskeletal:        Left knee: He exhibits LCL laxity and abnormal patellar mobility. He exhibits normal range of motion, no swelling, no effusion, no ecchymosis, no deformity, no laceration, no erythema, normal alignment, no bony tenderness, normal meniscus and no MCL laxity. Tenderness found. Medial joint line, lateral joint line and patellar tendon tenderness noted. No MCL and no LCL tenderness noted.   Neurological: He is alert and oriented to person, place, and time.   Skin: Skin is warm and dry. He is not diaphoretic.   Nursing note and vitals reviewed.        Current Outpatient Prescriptions:   •  atenolol (TENORMIN) 50 MG tablet, Take 1 tablet by mouth Daily., Disp: 90 tablet, Rfl: 1  •  balsalazide (COLAZAL) 750 MG capsule, Take 3 capsules by mouth 3 (Three) Times a Day., Disp: 270 capsule, Rfl: 11  •  Calcium-Magnesium-Vitamin D (CALCIUM 500 PO), Take 2 tablets by mouth daily., Disp: , Rfl:   •  Cholecalciferol (VITAMIN D3) 1000 UNITS capsule, Take by mouth daily., Disp: , Rfl:   •  fluocinolone (SYNALAR) 0.025 % cream, , Disp: , Rfl:   •  lisinopril (PRINIVIL,ZESTRIL) 40 MG tablet, Take 1 tablet by mouth Daily., Disp: 90 tablet, Rfl: 1  •  Multiple Vitamins-Minerals (CENTRUM SILVER ADULT 50+ PO), Take by mouth., Disp: , Rfl:   •  Omega-3 Fatty Acids (FISH OIL) 1200 MG capsule capsule, Take by mouth., Disp: , Rfl:   •  Suvorexant 10 MG tablet, Take 10 mg by mouth every night at bedtime., Disp: 10 tablet, Rfl: 0  •  vitamin B-12 (CYANOCOBALAMIN) 1000 MCG tablet, Take by mouth., Disp: , Rfl:   •  zolpidem (AMBIEN) 5 MG tablet, Take 1 tablet by mouth At Night As Needed for Sleep., Disp: 30 tablet, Rfl: 5  •  cyclobenzaprine (FLEXERIL) 5 MG tablet, Take 1 tablet by mouth 2 (Two) Times a Day As Needed for Muscle Spasms., Disp: 30 tablet, Rfl: 0  •   simvastatin (ZOCOR) 40 MG tablet, , Disp: , Rfl:   •  Suvorexant 20 MG tablet, Take 20 mg by mouth every night at bedtime., Disp: 10 tablet, Rfl: 0    Procedures    Lab Results (most recent)     None          Assessment/Plan   Bernard was seen today for follow-up and arthritis.    Diagnoses and all orders for this visit:    Insomnia, unspecified type  -     Discontinue: zolpidem (AMBIEN) 5 MG tablet; Take 1 tablet by mouth At Night As Needed for Sleep.  -     zolpidem (AMBIEN) 5 MG tablet; Take 1 tablet by mouth At Night As Needed for Sleep.    Primary osteoarthritis of left knee  -     cyclobenzaprine (FLEXERIL) 5 MG tablet; Take 1 tablet by mouth 2 (Two) Times a Day As Needed for Muscle Spasms.    Hyperlipidemia, unspecified hyperlipidemia type  -     Lipid Panel    Essential hypertension  -     Comprehensive Metabolic Panel  -     CBC Auto Differential    Chronic kidney disease, stage III (moderate)  -     Hemoglobin A1c    Elevated glucose  -     Comprehensive Metabolic Panel  -     Hemoglobin A1c      Refills and labs above. Samples of Pennsaid given. If this works will give a script.    Return in about 4 weeks (around 8/8/2017) for Recheck.      Laci Mcnair MD

## 2017-07-12 RX ORDER — BACLOFEN 10 MG/1
5 TABLET ORAL 2 TIMES DAILY
Qty: 30 TABLET | Refills: 0 | Status: SHIPPED | OUTPATIENT
Start: 2017-07-12 | End: 2018-02-08

## 2017-08-08 ENCOUNTER — ANESTHESIA (OUTPATIENT)
Dept: GASTROENTEROLOGY | Facility: HOSPITAL | Age: 77
End: 2017-08-08

## 2017-08-08 ENCOUNTER — HOSPITAL ENCOUNTER (OUTPATIENT)
Facility: HOSPITAL | Age: 77
Setting detail: HOSPITAL OUTPATIENT SURGERY
Discharge: HOME | End: 2017-08-08
Attending: INTERNAL MEDICINE | Admitting: INTERNAL MEDICINE

## 2017-08-08 ENCOUNTER — ANESTHESIA EVENT (OUTPATIENT)
Dept: GASTROENTEROLOGY | Facility: HOSPITAL | Age: 77
End: 2017-08-08

## 2017-08-08 VITALS
TEMPERATURE: 98.5 F | BODY MASS INDEX: 31.36 KG/M2 | DIASTOLIC BLOOD PRESSURE: 65 MMHG | WEIGHT: 224 LBS | HEART RATE: 57 BPM | HEIGHT: 71 IN | RESPIRATION RATE: 16 BRPM | OXYGEN SATURATION: 96 % | SYSTOLIC BLOOD PRESSURE: 122 MMHG

## 2017-08-08 DIAGNOSIS — K51.00 ULCERATIVE PANCOLITIS WITHOUT COMPLICATION (HCC): ICD-10-CM

## 2017-08-08 PROCEDURE — 45380 COLONOSCOPY AND BIOPSY: CPT | Performed by: INTERNAL MEDICINE

## 2017-08-08 PROCEDURE — 25010000002 PROPOFOL 10 MG/ML EMULSION: Performed by: NURSE ANESTHETIST, CERTIFIED REGISTERED

## 2017-08-08 PROCEDURE — 88305 TISSUE EXAM BY PATHOLOGIST: CPT | Performed by: INTERNAL MEDICINE

## 2017-08-08 PROCEDURE — S0260 H&P FOR SURGERY: HCPCS | Performed by: INTERNAL MEDICINE

## 2017-08-08 RX ORDER — PROPOFOL 10 MG/ML
VIAL (ML) INTRAVENOUS AS NEEDED
Status: DISCONTINUED | OUTPATIENT
Start: 2017-08-08 | End: 2017-08-08 | Stop reason: SURG

## 2017-08-08 RX ORDER — SODIUM CHLORIDE 0.9 % (FLUSH) 0.9 %
1-10 SYRINGE (ML) INJECTION AS NEEDED
Status: DISCONTINUED | OUTPATIENT
Start: 2017-08-08 | End: 2017-08-08 | Stop reason: HOSPADM

## 2017-08-08 RX ORDER — SODIUM CHLORIDE 0.9 % (FLUSH) 0.9 %
3 SYRINGE (ML) INJECTION AS NEEDED
Status: DISCONTINUED | OUTPATIENT
Start: 2017-08-08 | End: 2017-08-08 | Stop reason: HOSPADM

## 2017-08-08 RX ORDER — PROPOFOL 10 MG/ML
VIAL (ML) INTRAVENOUS CONTINUOUS PRN
Status: DISCONTINUED | OUTPATIENT
Start: 2017-08-08 | End: 2017-08-08 | Stop reason: SURG

## 2017-08-08 RX ORDER — SODIUM CHLORIDE, SODIUM LACTATE, POTASSIUM CHLORIDE, CALCIUM CHLORIDE 600; 310; 30; 20 MG/100ML; MG/100ML; MG/100ML; MG/100ML
1000 INJECTION, SOLUTION INTRAVENOUS CONTINUOUS PRN
Status: DISCONTINUED | OUTPATIENT
Start: 2017-08-08 | End: 2017-08-08 | Stop reason: HOSPADM

## 2017-08-08 RX ORDER — LIDOCAINE HYDROCHLORIDE 20 MG/ML
INJECTION, SOLUTION INFILTRATION; PERINEURAL AS NEEDED
Status: DISCONTINUED | OUTPATIENT
Start: 2017-08-08 | End: 2017-08-08 | Stop reason: SURG

## 2017-08-08 RX ADMIN — SODIUM CHLORIDE, POTASSIUM CHLORIDE, SODIUM LACTATE AND CALCIUM CHLORIDE 1000 ML: 600; 310; 30; 20 INJECTION, SOLUTION INTRAVENOUS at 12:31

## 2017-08-08 RX ADMIN — LIDOCAINE HYDROCHLORIDE 60 MG: 20 INJECTION, SOLUTION INFILTRATION; PERINEURAL at 13:19

## 2017-08-08 RX ADMIN — PROPOFOL 50 MG: 10 INJECTION, EMULSION INTRAVENOUS at 13:19

## 2017-08-08 RX ADMIN — PROPOFOL 140 MCG/KG/MIN: 10 INJECTION, EMULSION INTRAVENOUS at 13:19

## 2017-08-08 NOTE — PLAN OF CARE
Problem: Patient Care Overview (Adult)  Goal: Plan of Care Review  Outcome: Ongoing (interventions implemented as appropriate)    08/08/17 1157   Coping/Psychosocial Response Interventions   Plan Of Care Reviewed With patient   Patient Care Overview   Progress progress toward functional goals as expected       Goal: Adult Individualization and Mutuality  Outcome: Ongoing (interventions implemented as appropriate)  Goal: Discharge Needs Assessment  Outcome: Ongoing (interventions implemented as appropriate)    08/08/17 1157   Discharge Needs Assessment   Concerns To Be Addressed no discharge needs identified   Discharge Disposition home or self-care   Living Environment   Transportation Available car;family or friend will provide         Problem: GI Endoscopy (Adult)  Goal: Signs and Symptoms of Listed Potential Problems Will be Absent or Manageable (GI Endoscopy)  Outcome: Ongoing (interventions implemented as appropriate)    08/08/17 1157   GI Endoscopy   Problems Assessed (GI Endoscopy) all   Problems Present (GI Endoscopy) none

## 2017-08-08 NOTE — H&P
[facility]  Pre Procedure History & Physical    Chief Complaint:   Ulcerative colitis    Subjective     HPI:   Ulcerative colitis for many decades, surveillance colonoscopy due every 2 years    Past Medical History:   Past Medical History:   Diagnosis Date   • Acute renal failure    • Diverticulosis    • Hearing difficulty    • History of shingles    • Hyperlipidemia    • Hypertension    • Hypogonadism in male    • Insomnia    • Nephrolithiasis    • Osteopenia    • Renal insufficiency    • Ulcerative colitis        Past Surgical History:  Past Surgical History:   Procedure Laterality Date   • COLONOSCOPY  06/11/2015    [facility], actuve colitis w/cryptitis, rare crypt abscess & superficial erosion, hyperplastic polyp   • EYE SURGERY      cataract   • KIDNEY STONE SURGERY     • TUMOR EXCISION      rectal tumor       Family History:  Family History   Problem Relation Age of Onset   • Hypertension Mother    • Stroke Mother    • Asthma Sister    • Ulcerative colitis Sister        Social History:   reports that he quit smoking about 36 years ago. His smoking use included Cigarettes. He smoked 0.25 packs per day. He has never used smokeless tobacco. He reports that he drinks alcohol. He reports that he does not use illicit drugs.    Medications:   Prescriptions Prior to Admission   Medication Sig Dispense Refill Last Dose   • atenolol (TENORMIN) 50 MG tablet Take 1 tablet by mouth Daily. 90 tablet 1 8/8/2017 at Unknown time   • Calcium-Magnesium-Vitamin D (CALCIUM 500 PO) Take 2 tablets by mouth daily.   8/7/2017 at Unknown time   • Cholecalciferol (VITAMIN D3) 1000 UNITS capsule Take by mouth daily.   8/7/2017 at Unknown time   • fluocinolone (SYNALAR) 0.025 % cream    Past Week at Unknown time   • lisinopril (PRINIVIL,ZESTRIL) 40 MG tablet Take 1 tablet by mouth Daily. 90 tablet 1 8/8/2017 at Unknown time   • Multiple Vitamins-Minerals (CENTRUM SILVER ADULT 50+ PO) Take by mouth.   Past Week at  "Unknown time   • Omega-3 Fatty Acids (FISH OIL) 1200 MG capsule capsule Take by mouth.   Past Week at Unknown time   • vitamin B-12 (CYANOCOBALAMIN) 1000 MCG tablet Take by mouth.   Past Week at Unknown time   • zolpidem (AMBIEN) 5 MG tablet Take 1 tablet by mouth At Night As Needed for Sleep. 30 tablet 5 8/7/2017 at Unknown time   • baclofen (LIORESAL) 10 MG tablet Take 0.5 tablets by mouth 2 (Two) Times a Day. 30 tablet 0 Unknown at Unknown time   • balsalazide (COLAZAL) 750 MG capsule Take 3 capsules by mouth 3 (Three) Times a Day. 270 capsule 11 More than a month at Unknown time       Allergies:  Ciprofloxacin    ROS:    10 point review of systems is otherwise negative, pertinent positives are found in the history of present illness or subjective portion of this dictation     Objective     Blood pressure 144/88, pulse 55, temperature 98.5 °F (36.9 °C), temperature source Oral, resp. rate 18, height 71\" (180.3 cm), weight 224 lb (102 kg), SpO2 95 %.    Physical Exam   Constitutional: Pt is oriented to person, place, and time and well-developed, well-nourished, and in no distress.   Mouth/Throat: Oropharynx is clear and moist.   Neck: Normal range of motion.   Cardiovascular: Normal rate, regular rhythm and normal heart sounds.    Pulmonary/Chest: Effort normal and breath sounds normal.   Abdominal: Soft. Nontender  Skin: Skin is warm and dry.   Psychiatric: Mood, memory, affect and judgment normal.     Assessment/Plan     Diagnosis:  Ulcerative colitis    Anticipated Surgical Procedure:  Proceed to colonoscopy    The risks, benefits, and alternatives of this procedure have been discussed with the patient or the responsible party- the patient understands and agrees to proceed.                                                          "

## 2017-08-08 NOTE — ANESTHESIA PREPROCEDURE EVALUATION
Anesthesia Evaluation     Patient summary reviewed   NPO Solid Status: > 8 hours  NPO Liquid Status: > 6 hours     Airway   Mallampati: I  TM distance: >3 FB  Dental      Pulmonary    Cardiovascular     Rhythm: regular  Rate: normal    (+) hypertension, hyperlipidemia      Neuro/Psych  GI/Hepatic/Renal/Endo    (+) obesity,  renal disease stones,     ROS Comment: Ulcerative colitis    Musculoskeletal     Abdominal    Substance History      OB/GYN          Other                                        Anesthesia Plan    ASA 2     MAC   total IV anesthesia  intravenous induction   Anesthetic plan and risks discussed with patient.    Plan discussed with CRNA.

## 2017-08-08 NOTE — ANESTHESIA POSTPROCEDURE EVALUATION
"Patient: Bernard Resendez    Procedure Summary     Date Anesthesia Start Anesthesia Stop Room / Location    08/08/17 0241 2072  [facility] /  [facility]       Procedure Diagnosis Surgeon Provider    COLONOSCOPY TO CECUM AND TERMINAL ILEUM WITH COLD BIOPSIES (N/A ) Ulcerative pancolitis without complication  (Ulcerative pancolitis without complication [K51.00]) MD Kristan Lewis MD          Anesthesia Type: MAC  Last vitals  BP   102/54 (08/08/17 1345)    Temp   36.9 °C (98.5 °F) (08/08/17 1345)    Pulse   52 (08/08/17 1345)   Resp   16 (08/08/17 1345)    SpO2   97 % (08/08/17 1345)      Post Anesthesia Care and Evaluation    Patient location during evaluation: bedside  Patient participation: complete - patient participated  Level of consciousness: awake and alert  Pain management: adequate  Airway patency: patent  Anesthetic complications: No anesthetic complications  PONV Status: none  Cardiovascular status: acceptable  Respiratory status: acceptable  Hydration status: acceptable    Comments: /54  Pulse 52  Temp 36.9 °C (98.5 °F) (Oral)   Resp 16  Ht 71\" (180.3 cm)  Wt 224 lb (102 kg)  SpO2 97%  BMI 31.24 kg/m2        "

## 2017-08-08 NOTE — DISCHARGE INSTRUCTIONS
For the next 24 hours patient needs to be with a responsible adult.    For 24 hours DO NOT drive, operate machinery, appliances, drink alcohol, make important decisions or sign legal documents.    Start with a light or bland diet and advance to regular diet as tolerated.    Follow recommendations on procedure report provided by your doctor.    Call Dr Gifford for problems [phone]    Problems may include but not limited to: large amounts of bleeding, trouble breathing, repeated vomiting, severe unrelieved pain, fever or chills.

## 2017-08-09 LAB
LAB AP CASE REPORT: NORMAL
Lab: NORMAL
PATH REPORT.FINAL DX SPEC: NORMAL
PATH REPORT.GROSS SPEC: NORMAL

## 2017-08-10 ENCOUNTER — TELEPHONE (OUTPATIENT)
Dept: GASTROENTEROLOGY | Facility: CLINIC | Age: 77
End: 2017-08-10

## 2017-08-10 NOTE — TELEPHONE ENCOUNTER
----- Message from Calvin Gifford MD sent at 8/10/2017  4:52 PM EDT -----  Pathology is benign.  Colonoscopy recall 2 years.  Office visit 6 months.  Continue balsalazide.  Patient called advised of Dr. Gifford's note. Advised to call back late December, early January to set up 6 month f/u appt. He verb understanding. Patient's health maintenance record was updated to reflect the need to repeat colonoscopy in 2 years.

## 2017-08-11 ENCOUNTER — OFFICE VISIT (OUTPATIENT)
Dept: FAMILY MEDICINE CLINIC | Facility: CLINIC | Age: 77
End: 2017-08-11

## 2017-08-11 VITALS
SYSTOLIC BLOOD PRESSURE: 122 MMHG | TEMPERATURE: 98.6 F | WEIGHT: 224 LBS | OXYGEN SATURATION: 100 % | RESPIRATION RATE: 14 BRPM | HEART RATE: 52 BPM | HEIGHT: 71 IN | BODY MASS INDEX: 31.36 KG/M2 | DIASTOLIC BLOOD PRESSURE: 78 MMHG

## 2017-08-11 DIAGNOSIS — E78.5 HYPERLIPIDEMIA, UNSPECIFIED HYPERLIPIDEMIA TYPE: Primary | ICD-10-CM

## 2017-08-11 DIAGNOSIS — M17.12 PRIMARY OSTEOARTHRITIS OF LEFT KNEE: ICD-10-CM

## 2017-08-11 DIAGNOSIS — I10 ESSENTIAL HYPERTENSION: ICD-10-CM

## 2017-08-11 PROCEDURE — 99214 OFFICE O/P EST MOD 30 MIN: CPT | Performed by: FAMILY MEDICINE

## 2017-08-11 NOTE — PROGRESS NOTES
Subjective   Bernard Resendez is a 77 y.o. male.     Chief Complaint   Patient presents with   • Follow-up     Patient is following up on meds. He needs to go over lab results. He needs to discuss using of Pennsaid samples.        HPI     Hyperlipidemia: Labs that were done before this visit were drawn and showed elevated total cholesterol 207 with an elevated LDL of 134.  This is up over the last 6 months after we decided to stop his statin therapy.  He continues to try to maintain a healthy diet  As regimen but admits that there can be removed for improvement.    Hypertension: Stable and well controlled on current therapy of lisinopril, atenolol.  No adverse side effects noted.  Tries to avoid salt.    Patient has a history of primary osteoarthritis of the left knee.  He was given some samples of Pennsaid which she states worked very well.  He was hoping to get a prescription for this but his insurance will not cover it.    The following portions of the patient's history were reviewed and updated as appropriate: allergies, current medications, past family history, past medical history, past social history, past surgical history and problem list.    Review of Systems   Constitutional: Negative for activity change.   All other systems reviewed and are negative.      Objective  Vitals:    08/11/17 1116   BP: 122/78   Pulse: 52   Resp: 14   Temp: 98.6 °F (37 °C)   SpO2: 100%        Physical Exam   Constitutional: He is oriented to person, place, and time. He appears well-developed and well-nourished. No distress.   HENT:   Head: Normocephalic and atraumatic.   Right Ear: External ear normal.   Left Ear: External ear normal.   Nose: Nose normal.   Mouth/Throat: Oropharynx is clear and moist.   Eyes: Conjunctivae and EOM are normal. Pupils are equal, round, and reactive to light. Right eye exhibits no discharge. Left eye exhibits no discharge. No scleral icterus.   Cardiovascular: Normal rate, regular rhythm and normal heart  sounds.    Pulmonary/Chest: Effort normal and breath sounds normal. No respiratory distress. He has no wheezes. He has no rales.   Abdominal: Soft. Bowel sounds are normal. He exhibits no distension. There is no tenderness.   Neurological: He is alert and oriented to person, place, and time.   Skin: Skin is warm and dry. He is not diaphoretic.   Nursing note and vitals reviewed.        Current Outpatient Prescriptions:   •  atenolol (TENORMIN) 50 MG tablet, Take 1 tablet by mouth Daily., Disp: 90 tablet, Rfl: 1  •  baclofen (LIORESAL) 10 MG tablet, Take 0.5 tablets by mouth 2 (Two) Times a Day., Disp: 30 tablet, Rfl: 0  •  balsalazide (COLAZAL) 750 MG capsule, Take 3 capsules by mouth 3 (Three) Times a Day., Disp: 270 capsule, Rfl: 11  •  Calcium-Magnesium-Vitamin D (CALCIUM 500 PO), Take 2 tablets by mouth daily., Disp: , Rfl:   •  Cholecalciferol (VITAMIN D3) 1000 UNITS capsule, Take by mouth daily., Disp: , Rfl:   •  lisinopril (PRINIVIL,ZESTRIL) 40 MG tablet, Take 1 tablet by mouth Daily., Disp: 90 tablet, Rfl: 1  •  Multiple Vitamins-Minerals (CENTRUM SILVER ADULT 50+ PO), Take by mouth., Disp: , Rfl:   •  Omega-3 Fatty Acids (FISH OIL) 1200 MG capsule capsule, Take by mouth., Disp: , Rfl:   •  vitamin B-12 (CYANOCOBALAMIN) 1000 MCG tablet, Take by mouth., Disp: , Rfl:   •  zolpidem (AMBIEN) 5 MG tablet, Take 1 tablet by mouth At Night As Needed for Sleep., Disp: 30 tablet, Rfl: 5  •  fluocinolone (SYNALAR) 0.025 % cream, , Disp: , Rfl:     Procedures    Lab Results (most recent)     None          Assessment/Plan   Bernard was seen today for follow-up.    Diagnoses and all orders for this visit:    Hyperlipidemia, unspecified hyperlipidemia type    Essential hypertension    Primary osteoarthritis of left knee    Labs were reviewed with the patient.  Continue current therapy.  We'll hold off on starting statin therapy for another 6 months.  Patient will return in 6 months for Medicare wellness visit at which time  we will recheck his cholesterol.  If his numbers continue to be going up and may decide to restart statin therapy.  A sample of Pennsaid was given to the patient for home use.    Return in about 6 months (around 2/11/2018) for Medicare wellness exam.      Laci Mcnair MD

## 2017-11-27 DIAGNOSIS — I10 ESSENTIAL HYPERTENSION: ICD-10-CM

## 2017-11-27 RX ORDER — ATENOLOL 50 MG/1
50 TABLET ORAL DAILY
Qty: 90 TABLET | Refills: 3 | Status: SHIPPED | OUTPATIENT
Start: 2017-11-27 | End: 2018-12-01 | Stop reason: SDUPTHER

## 2017-11-27 RX ORDER — LISINOPRIL 40 MG/1
40 TABLET ORAL DAILY
Qty: 90 TABLET | Refills: 3 | Status: SHIPPED | OUTPATIENT
Start: 2017-11-27 | End: 2018-12-01 | Stop reason: SDUPTHER

## 2018-01-02 ENCOUNTER — OFFICE VISIT (OUTPATIENT)
Dept: FAMILY MEDICINE CLINIC | Facility: CLINIC | Age: 78
End: 2018-01-02

## 2018-01-02 VITALS
HEIGHT: 71 IN | HEART RATE: 60 BPM | OXYGEN SATURATION: 98 % | WEIGHT: 223 LBS | RESPIRATION RATE: 14 BRPM | TEMPERATURE: 98.6 F | DIASTOLIC BLOOD PRESSURE: 62 MMHG | SYSTOLIC BLOOD PRESSURE: 128 MMHG | BODY MASS INDEX: 31.22 KG/M2

## 2018-01-02 DIAGNOSIS — R05.9 COUGH: ICD-10-CM

## 2018-01-02 DIAGNOSIS — J40 SINOBRONCHITIS: Primary | ICD-10-CM

## 2018-01-02 DIAGNOSIS — J32.9 SINOBRONCHITIS: Primary | ICD-10-CM

## 2018-01-02 LAB
EXPIRATION DATE: NORMAL
FLUAV AG NPH QL: NORMAL
FLUBV AG NPH QL: NORMAL
INTERNAL CONTROL: NORMAL
Lab: NORMAL

## 2018-01-02 PROCEDURE — 87804 INFLUENZA ASSAY W/OPTIC: CPT | Performed by: FAMILY MEDICINE

## 2018-01-02 PROCEDURE — 99214 OFFICE O/P EST MOD 30 MIN: CPT | Performed by: FAMILY MEDICINE

## 2018-01-02 RX ORDER — AZITHROMYCIN 250 MG/1
TABLET, FILM COATED ORAL
Qty: 6 TABLET | Refills: 0 | Status: SHIPPED | OUTPATIENT
Start: 2018-01-02 | End: 2018-02-08

## 2018-01-02 RX ORDER — PREDNISONE 20 MG/1
40 TABLET ORAL DAILY
Qty: 10 TABLET | Refills: 0 | Status: SHIPPED | OUTPATIENT
Start: 2018-01-02 | End: 2018-02-08

## 2018-01-02 NOTE — PROGRESS NOTES
Subjective   Bernard Resendez is a 77 y.o. male.     Chief Complaint   Patient presents with   • URI     Patient is having cough, congestion, nasal drainage, and headaches.        HPI     Patient since the office today with problems that are new to me.  Patient states that for about 7-10 days he's had increasing levels of sinus congestion, drainage, cough, and chest congestion.  She tried some over-the-counter therapies which have not helped.  Symptoms seem to be getting worse.    The following portions of the patient's history were reviewed and updated as appropriate: allergies, current medications, past family history, past medical history, past social history, past surgical history and problem list.    Review of Systems   HENT: Positive for congestion.    Respiratory: Positive for cough.    All other systems reviewed and are negative.      Objective  Vitals:    01/02/18 1140   BP: 128/62   Pulse: 60   Resp: 14   Temp: 98.6 °F (37 °C)   SpO2: 98%        Physical Exam   Constitutional: He is oriented to person, place, and time. He appears well-developed and well-nourished. No distress.   HENT:   Head: Normocephalic and atraumatic.   Right Ear: External ear and ear canal normal. No drainage, swelling or tenderness. Tympanic membrane is bulging. Tympanic membrane is not injected and not erythematous. Tympanic membrane mobility is normal. No middle ear effusion. Decreased hearing is noted.   Left Ear: External ear and ear canal normal. No drainage, swelling or tenderness. Tympanic membrane is bulging. Tympanic membrane is not injected and not erythematous. Tympanic membrane mobility is normal.  No middle ear effusion. Decreased hearing is noted.   Nose: Mucosal edema and rhinorrhea present. Right sinus exhibits maxillary sinus tenderness and frontal sinus tenderness. Left sinus exhibits maxillary sinus tenderness and frontal sinus tenderness.   Mouth/Throat: Uvula is midline. Posterior oropharyngeal erythema present. No  oropharyngeal exudate, posterior oropharyngeal edema or tonsillar abscesses. No tonsillar exudate.   Eyes: Conjunctivae and EOM are normal. Pupils are equal, round, and reactive to light. Right eye exhibits no discharge. Left eye exhibits no discharge. No scleral icterus.   Neck: Normal range of motion. Neck supple.   Cardiovascular: Normal rate, regular rhythm and normal heart sounds.  Exam reveals no friction rub.    No murmur heard.  Pulmonary/Chest: Effort normal and breath sounds normal. No respiratory distress. He has no wheezes. He has no rales.   Abdominal: Soft. Bowel sounds are normal. He exhibits no distension. There is no tenderness. There is no rebound and no guarding.   Lymphadenopathy:     He has no cervical adenopathy.   Neurological: He is alert and oriented to person, place, and time.   Skin: Skin is warm and dry. He is not diaphoretic.   Nursing note and vitals reviewed.        Current Outpatient Prescriptions:   •  atenolol (TENORMIN) 50 MG tablet, Take 1 tablet by mouth Daily., Disp: 90 tablet, Rfl: 3  •  balsalazide (COLAZAL) 750 MG capsule, Take 3 capsules by mouth 3 (Three) Times a Day., Disp: 270 capsule, Rfl: 11  •  Calcium-Magnesium-Vitamin D (CALCIUM 500 PO), Take 2 tablets by mouth daily., Disp: , Rfl:   •  Cholecalciferol (VITAMIN D3) 1000 UNITS capsule, Take by mouth daily., Disp: , Rfl:   •  fluocinolone (SYNALAR) 0.025 % cream, , Disp: , Rfl:   •  lisinopril (PRINIVIL,ZESTRIL) 40 MG tablet, Take 1 tablet by mouth Daily., Disp: 90 tablet, Rfl: 3  •  Multiple Vitamins-Minerals (CENTRUM SILVER ADULT 50+ PO), Take by mouth., Disp: , Rfl:   •  Omega-3 Fatty Acids (FISH OIL) 1200 MG capsule capsule, Take by mouth., Disp: , Rfl:   •  vitamin B-12 (CYANOCOBALAMIN) 1000 MCG tablet, Take by mouth., Disp: , Rfl:   •  zolpidem (AMBIEN) 5 MG tablet, Take 1 tablet by mouth At Night As Needed for Sleep., Disp: 30 tablet, Rfl: 5  •  azithromycin (ZITHROMAX Z-LESTER) 250 MG tablet, Take 2 tablets the  first day, then 1 tablet daily for 4 days., Disp: 6 tablet, Rfl: 0  •  baclofen (LIORESAL) 10 MG tablet, Take 0.5 tablets by mouth 2 (Two) Times a Day., Disp: 30 tablet, Rfl: 0  •  predniSONE (DELTASONE) 20 MG tablet, Take 2 tablets by mouth Daily., Disp: 10 tablet, Rfl: 0    Procedures    Lab Results (most recent)     None          Assessment/Plan   Bernard was seen today for uri.    Diagnoses and all orders for this visit:    Sinobronchitis  -     predniSONE (DELTASONE) 20 MG tablet; Take 2 tablets by mouth Daily.  -     azithromycin (ZITHROMAX Z-LESTER) 250 MG tablet; Take 2 tablets the first day, then 1 tablet daily for 4 days.    Cough  -     POC Influenza A / B  -     predniSONE (DELTASONE) 20 MG tablet; Take 2 tablets by mouth Daily.  -     azithromycin (ZITHROMAX Z-LESTER) 250 MG tablet; Take 2 tablets the first day, then 1 tablet daily for 4 days.    Influenza test negative.  We'll treat with antibiotics and steroids as above.  Discussed other symptomatic management.    Return for As needed.      Laci Mcnair MD

## 2018-02-01 ENCOUNTER — TELEPHONE (OUTPATIENT)
Dept: GASTROENTEROLOGY | Facility: CLINIC | Age: 78
End: 2018-02-01

## 2018-02-01 RX ORDER — BALSALAZIDE DISODIUM 750 MG/1
2250 CAPSULE ORAL 3 TIMES DAILY
Qty: 270 CAPSULE | Refills: 0 | Status: SHIPPED | OUTPATIENT
Start: 2018-02-01 | End: 2018-02-08 | Stop reason: SDUPTHER

## 2018-02-01 NOTE — TELEPHONE ENCOUNTER
Returned patient's phone call. Requesting refill on his Balsalazide. Advised as per Dr. Gifford's note he wanted the patient to f/u in the office in 6 month. F/u appt with Annika @0365 on 2/8/18. Advised once Floresita assesses the patient she will refill his medication for a year. He verb understanding and is agreeable to the plan.

## 2018-02-01 NOTE — TELEPHONE ENCOUNTER
----- Message from Rajiv Koenig sent at 2/1/2018  8:48 AM EST -----  Regarding: REFILL MEDS   Contact: [phone]   PT IS CALLING REQUESTING A REFILL ON MEDICATION balsalazide (COLAZAL) 750 MG capsule     PHARM#[phone]

## 2018-02-05 DIAGNOSIS — G47.00 INSOMNIA, UNSPECIFIED TYPE: ICD-10-CM

## 2018-02-06 RX ORDER — ZOLPIDEM TARTRATE 5 MG/1
TABLET ORAL
Qty: 30 TABLET | Refills: 4 | OUTPATIENT
Start: 2018-02-06 | End: 2018-02-15 | Stop reason: SDUPTHER

## 2018-02-08 ENCOUNTER — OFFICE VISIT (OUTPATIENT)
Dept: GASTROENTEROLOGY | Facility: CLINIC | Age: 78
End: 2018-02-08

## 2018-02-08 VITALS
SYSTOLIC BLOOD PRESSURE: 126 MMHG | BODY MASS INDEX: 32.76 KG/M2 | HEIGHT: 71 IN | DIASTOLIC BLOOD PRESSURE: 82 MMHG | TEMPERATURE: 97.6 F | WEIGHT: 234 LBS

## 2018-02-08 DIAGNOSIS — K51.80 OTHER ULCERATIVE COLITIS WITHOUT COMPLICATION (HCC): Primary | ICD-10-CM

## 2018-02-08 PROCEDURE — 99213 OFFICE O/P EST LOW 20 MIN: CPT | Performed by: NURSE PRACTITIONER

## 2018-02-08 RX ORDER — BALSALAZIDE DISODIUM 750 MG/1
2250 CAPSULE ORAL 3 TIMES DAILY
Qty: 270 CAPSULE | Refills: 11 | Status: SHIPPED | OUTPATIENT
Start: 2018-02-08 | End: 2018-12-03

## 2018-02-08 NOTE — PROGRESS NOTES
Chief Complaint   Patient presents with   • Follow-up     medication refill      HPI    77-year-old patient of Dr. Gifford's followed for history of ulcerative colitis diagnosed greater than 40 years ago.  Previous workup has included a colonoscopy in August 2017 demonstrating remission.  He is currently maintained on balsalazide 3 tablets 3 times daily.  He presents today for routine follow-up and request for medication refill.    Patient is having one to 2 bowel movements a day that typically occur early in the morning.  He denies any blood in his stool and no melena.  He has no chronic abdominal pain.  He denies any extraintestinal manifestations of his irritable bowel disease specifically no skin lesions, redness of the eyes or joint pain or arthralgias.  His weight has been stable.  He has an appointment with his primary care doctor next week for yearly physical and lab work.  I have provided him with a list of labs I would like him to have done at the time of that appointment.    Past Medical History:   Diagnosis Date   • Acute renal failure    • Diverticulosis    • Hearing difficulty    • History of shingles    • Hyperlipidemia    • Hypertension    • Hypogonadism in male    • Insomnia    • Nephrolithiasis    • Osteopenia    • Renal insufficiency    • Ulcerative colitis      Past Surgical History:   Procedure Laterality Date   • COLONOSCOPY  06/11/2015    NBI[facility], actuve colitis w/cryptitis, rare crypt abscess & superficial erosion, hyperplastic polyp   • COLONOSCOPY N/A 8/8/2017    IH, nodular mucosa in the proximal rectum and in the distal rectum, Diverticulosis in the sigmoid colon, the descending colon and at the splenic flexure.  PATH: Hyperplastic polyps    • EYE SURGERY      cataract   • KIDNEY STONE SURGERY     • TUMOR EXCISION      rectal tumor       Current Outpatient Prescriptions   Medication Sig Dispense Refill   • atenolol (TENORMIN) 50 MG tablet Take 1 tablet by mouth Daily. 90 tablet 3   •  Calcium-Magnesium-Vitamin D (CALCIUM 500 PO) Take 2 tablets by mouth daily.     • Cholecalciferol (VITAMIN D3) 1000 UNITS capsule Take by mouth daily.     • fluocinolone (SYNALAR) 0.025 % cream      • lisinopril (PRINIVIL,ZESTRIL) 40 MG tablet Take 1 tablet by mouth Daily. 90 tablet 3   • Multiple Vitamins-Minerals (CENTRUM SILVER ADULT 50+ PO) Take by mouth.     • Omega-3 Fatty Acids (FISH OIL) 1200 MG capsule capsule Take by mouth.     • vitamin B-12 (CYANOCOBALAMIN) 1000 MCG tablet Take by mouth.     • zolpidem (AMBIEN) 5 MG tablet TAKE ONE TABLET BY MOUTH AT NIGHT AS NEEDED FOR SLEEP 30 tablet 4   • balsalazide (COLAZAL) 750 MG capsule Take 3 capsules by mouth 3 (Three) Times a Day. 270 capsule 11     No current facility-administered medications for this visit.        PRN Meds:.    Allergies   Allergen Reactions   • Ciprofloxacin        Social History     Social History   • Marital status:      Spouse name: N/A   • Number of children: N/A   • Years of education: N/A     Occupational History   • Not on file.     Social History Main Topics   • Smoking status: Former Smoker     Packs/day: 0.25     Types: Cigarettes     Quit date: 1981   • Smokeless tobacco: Never Used   • Alcohol use 1.2 oz/week     2 Shots of liquor per week      Comment: socially   • Drug use: No   • Sexual activity: Defer     Other Topics Concern   • Not on file     Social History Narrative       Family History   Problem Relation Age of Onset   • Hypertension Mother    • Stroke Mother    • Asthma Sister    • Ulcerative colitis Sister        Review of Systems   Constitutional: Negative for activity change, appetite change and unexpected weight change.   HENT: Negative for trouble swallowing.    Eyes: Negative for redness.   Respiratory: Negative for cough and choking.    Gastrointestinal: Negative for abdominal distention, abdominal pain, blood in stool, constipation, diarrhea, nausea and vomiting.   Musculoskeletal: Negative for  "arthralgias.   Skin: Negative for rash.   Allergic/Immunologic: Negative for immunocompromised state.       Vitals:    02/08/18 1123   BP: 126/82   Temp: 97.6 °F (36.4 °C)     /82 (BP Location: Left arm, Patient Position: Sitting)  Temp 97.6 °F (36.4 °C) (Oral)   Ht 180.3 cm (71\")  Wt 106 kg (234 lb)  BMI 32.64 kg/m2  Physical Exam   Constitutional: He is oriented to person, place, and time. He appears well-developed and well-nourished.   Eyes: No scleral icterus.   Sclera clear   Cardiovascular: Normal rate and regular rhythm.    Pulmonary/Chest: Effort normal and breath sounds normal.   Abdominal: Soft. Bowel sounds are normal.   Neurological: He is alert and oriented to person, place, and time.   Skin: Skin is warm and dry. No rash noted.   Psychiatric: He has a normal mood and affect.   Vitals reviewed.      ASSESSMENT AND PLAN    Bernard was seen today for follow-up.    Diagnoses and all orders for this visit:    Other ulcerative colitis without complication  Comments:  Cpscope 8/2017 remission, continue balsalazide.  Dxed 40 years ago. Recall scope planned 8.2019    Other orders  -     balsalazide (COLAZAL) 750 MG capsule; Take 3 capsules by mouth 3 (Three) Times a Day.    Continue current mesalamine.  Labs to be obtained by PCP at routine physical exam next week, I have provided a list to the patient with regard to obtaining a CBC, CMP, vitamin D and B12 levels when labs are obtained.  If there are any acute abnormalities that need further evaluation patient can be referred back to our office.  He is due for recall colonoscopy in August 2019, he should be on every two-year screenings with diagnosis of ulcerative colitis being greater than 40 years ago.  He will return to our office in one year, his colonoscopy can be scheduled at that time.  He can contact the office sooner for any changes from today's assessment.         Tamara Mayes, ANTELMO   [facility]  [address] [place] [address] Way - " [address]  [place] [address]  Office: [phone]

## 2018-02-15 ENCOUNTER — OFFICE VISIT (OUTPATIENT)
Dept: FAMILY MEDICINE CLINIC | Facility: CLINIC | Age: 78
End: 2018-02-15

## 2018-02-15 VITALS
DIASTOLIC BLOOD PRESSURE: 78 MMHG | SYSTOLIC BLOOD PRESSURE: 122 MMHG | HEIGHT: 71 IN | RESPIRATION RATE: 14 BRPM | OXYGEN SATURATION: 98 % | BODY MASS INDEX: 32.62 KG/M2 | WEIGHT: 233 LBS | TEMPERATURE: 98.6 F | HEART RATE: 54 BPM

## 2018-02-15 DIAGNOSIS — Z12.5 SCREENING FOR PROSTATE CANCER: ICD-10-CM

## 2018-02-15 DIAGNOSIS — E53.8 B12 DEFICIENCY: ICD-10-CM

## 2018-02-15 DIAGNOSIS — E55.9 VITAMIN D DEFICIENCY: ICD-10-CM

## 2018-02-15 DIAGNOSIS — R73.09 ELEVATED GLUCOSE LEVEL: ICD-10-CM

## 2018-02-15 DIAGNOSIS — G47.00 INSOMNIA, UNSPECIFIED TYPE: ICD-10-CM

## 2018-02-15 DIAGNOSIS — E78.5 HYPERLIPIDEMIA, UNSPECIFIED HYPERLIPIDEMIA TYPE: ICD-10-CM

## 2018-02-15 DIAGNOSIS — Z00.00 ENCOUNTER FOR HEALTH MAINTENANCE EXAMINATION: Primary | ICD-10-CM

## 2018-02-15 DIAGNOSIS — Z00.00 MEDICARE ANNUAL WELLNESS VISIT, INITIAL: ICD-10-CM

## 2018-02-15 LAB
25(OH)D3+25(OH)D2 SERPL-MCNC: 43.6 NG/ML (ref 30–100)
ALBUMIN SERPL-MCNC: 3.9 G/DL (ref 3.5–5.2)
ALBUMIN/GLOB SERPL: 1.6 G/DL
ALP SERPL-CCNC: 55 U/L (ref 39–117)
ALT SERPL-CCNC: 7 U/L (ref 1–41)
AST SERPL-CCNC: 15 U/L (ref 1–40)
BASOPHILS # BLD AUTO: 0.03 10*3/MM3 (ref 0–0.2)
BASOPHILS NFR BLD AUTO: 0.4 % (ref 0–1.5)
BILIRUB SERPL-MCNC: 0.6 MG/DL (ref 0.1–1.2)
BUN SERPL-MCNC: 20 MG/DL (ref 8–23)
BUN/CREAT SERPL: 19 (ref 7–25)
CALCIUM SERPL-MCNC: 8.9 MG/DL (ref 8.6–10.5)
CHLORIDE SERPL-SCNC: 103 MMOL/L (ref 98–107)
CHOLEST SERPL-MCNC: 235 MG/DL (ref 0–200)
CO2 SERPL-SCNC: 26.7 MMOL/L (ref 22–29)
CREAT SERPL-MCNC: 1.05 MG/DL (ref 0.76–1.27)
EOSINOPHIL # BLD AUTO: 0.29 10*3/MM3 (ref 0–0.7)
EOSINOPHIL NFR BLD AUTO: 3.6 % (ref 0.3–6.2)
ERYTHROCYTE [DISTWIDTH] IN BLOOD BY AUTOMATED COUNT: 13.7 % (ref 11.5–14.5)
FOLATE SERPL-MCNC: 12.45 NG/ML (ref 4.78–24.2)
GFR SERPLBLD CREATININE-BSD FMLA CKD-EPI: 68 ML/MIN/1.73
GFR SERPLBLD CREATININE-BSD FMLA CKD-EPI: 83 ML/MIN/1.73
GLOBULIN SER CALC-MCNC: 2.5 GM/DL
GLUCOSE SERPL-MCNC: 91 MG/DL (ref 65–99)
HCT VFR BLD AUTO: 44.4 % (ref 40.4–52.2)
HDLC SERPL-MCNC: 54 MG/DL (ref 40–60)
HGB BLD-MCNC: 14.7 G/DL (ref 13.7–17.6)
IMM GRANULOCYTES # BLD: 0.03 10*3/MM3 (ref 0–0.03)
IMM GRANULOCYTES NFR BLD: 0.4 % (ref 0–0.5)
LDLC SERPL CALC-MCNC: 159 MG/DL (ref 0–100)
LYMPHOCYTES # BLD AUTO: 1.72 10*3/MM3 (ref 0.9–4.8)
LYMPHOCYTES NFR BLD AUTO: 21.6 % (ref 19.6–45.3)
MCH RBC QN AUTO: 29.3 PG (ref 27–32.7)
MCHC RBC AUTO-ENTMCNC: 33.1 G/DL (ref 32.6–36.4)
MCV RBC AUTO: 88.4 FL (ref 79.8–96.2)
MONOCYTES # BLD AUTO: 0.7 10*3/MM3 (ref 0.2–1.2)
MONOCYTES NFR BLD AUTO: 8.8 % (ref 5–12)
NEUTROPHILS # BLD AUTO: 5.2 10*3/MM3 (ref 1.9–8.1)
NEUTROPHILS NFR BLD AUTO: 65.2 % (ref 42.7–76)
PLATELET # BLD AUTO: 205 10*3/MM3 (ref 140–500)
POTASSIUM SERPL-SCNC: 4.8 MMOL/L (ref 3.5–5.2)
PROT SERPL-MCNC: 6.4 G/DL (ref 6–8.5)
PSA SERPL-MCNC: 4.55 NG/ML (ref 0–4)
RBC # BLD AUTO: 5.02 10*6/MM3 (ref 4.6–6)
SODIUM SERPL-SCNC: 141 MMOL/L (ref 136–145)
TRIGL SERPL-MCNC: 108 MG/DL (ref 0–150)
VIT B12 SERPL-MCNC: 860 PG/ML (ref 211–946)
VLDLC SERPL CALC-MCNC: 21.6 MG/DL (ref 5–40)
WBC # BLD AUTO: 7.97 10*3/MM3 (ref 4.5–10.7)

## 2018-02-15 PROCEDURE — G0438 PPPS, INITIAL VISIT: HCPCS | Performed by: FAMILY MEDICINE

## 2018-02-15 PROCEDURE — 99397 PER PM REEVAL EST PAT 65+ YR: CPT | Performed by: FAMILY MEDICINE

## 2018-02-15 PROCEDURE — 99214 OFFICE O/P EST MOD 30 MIN: CPT | Performed by: FAMILY MEDICINE

## 2018-02-15 RX ORDER — ZOLPIDEM TARTRATE 5 MG/1
5 TABLET ORAL NIGHTLY PRN
Qty: 30 TABLET | Refills: 5 | Status: SHIPPED | OUTPATIENT
Start: 2018-02-15 | End: 2018-08-31 | Stop reason: SDUPTHER

## 2018-02-15 NOTE — PATIENT INSTRUCTIONS
Medicare Wellness  Personal Prevention Plan of Service     Date of Office Visit:  02/15/2018  Encounter Provider:  Laci Mcnair MD  Place of Service:  [facility]  Patient Name: Bernard Resendez  :  1940    As part of the Medicare Wellness portion of your visit today, we are providing you with this personalized preventive plan of services (PPPS). This plan is based upon recommendations of the [place] Preventive Services Task Force (USPSTF) and the Advisory Committee on Immunization Practices (ACIP).    This lists the preventive care services that should be considered, and provides dates of when you are due. Items listed as completed are up-to-date and do not require any further intervention.    Health Maintenance   Topic Date Due   • TDAP/TD VACCINES (1 - Tdap) 1957   • DXA SCAN  2017   • LIPID PANEL  2018   • PNEUMOCOCCAL VACCINES (65+ LOW/MEDIUM RISK) (2 of 2 - PPSV23) 2018   • MEDICARE ANNUAL WELLNESS  02/15/2019   • COLONOSCOPY  2019   • INFLUENZA VACCINE  Completed   • ZOSTER VACCINE  Completed       Orders Placed This Encounter   Procedures   • CBC Auto Differential   • Lipid Panel   • Vitamin B12   • Folate   • Comprehensive Metabolic Panel   • Vitamin D 25 Hydroxy   • PSA Screen       Return in about 3 months (around 5/15/2018) for Recheck.

## 2018-02-15 NOTE — PROGRESS NOTES
QUICK REFERENCE INFORMATION:  The ABCs of the Annual Wellness Visit    Initial Medicare Wellness Visit    HEALTH RISK ASSESSMENT    1940    Recent Hospitalizations:  No hospitalization(s) within the last year..        Current Medical Providers:  Patient Care Team:  Laci Mcnair MD as PCP - General (Family Medicine)        Smoking Status:  History   Smoking Status   • Former Smoker   • Packs/day: 0.25   • Types: Cigarettes   • Quit date: 1981   Smokeless Tobacco   • Never Used       Alcohol Consumption:  History   Alcohol Use   • 1.2 oz/week   • 2 Shots of liquor per week     Comment: socially       Depression Screen:   PHQ-2/PHQ-9 Depression Screening 2/15/2018   Little interest or pleasure in doing things 1   Feeling down, depressed, or hopeless 0   Trouble falling or staying asleep, or sleeping too much 1   Feeling tired or having little energy 0   Poor appetite or overeating 0   Feeling bad about yourself - or that you are a failure or have let yourself or your family down 0   Trouble concentrating on things, such as reading the newspaper or watching television 0   Moving or speaking so slowly that other people could have noticed. Or the opposite - being so fidgety or restless that you have been moving around a lot more than usual 0   Thoughts that you would be better off dead, or of hurting yourself in some way 0   Total Score 2   If you checked off any problems, how difficult have these problems made it for you to do your work, take care of things at home, or get along with other people? Not difficult at all       Health Habits and Functional and Cognitive Screening:  Functional & Cognitive Status 2/15/2018   Do you have difficulty preparing food and eating? No   Do you have difficulty bathing yourself, getting dressed or grooming yourself? No   Do you have difficulty using the toilet? No   Do you have difficulty moving around from place to place? No   Do you have trouble with steps or getting out of a  bed or a chair? No   In the past year have you fallen or experienced a near fall? No   Do you need help using the phone?  No   Are you deaf or do you have serious difficulty hearing?  No   Do you need help with transportation? No   Do you need help shopping? No   Do you need help preparing meals?  No   Do you need help with housework?  No   Do you need help with laundry? No   Do you need help taking your medications? No   Do you need help managing money? No   Have you felt unusual stress, anger or loneliness in the last month? No   Who do you live with? Other   If you need help, do you have trouble finding someone available to you? No   Have you been bothered in the last four weeks by sexual problems? No   Do you have difficulty concentrating, remembering or making decisions? No           Does the patient have evidence of cognitive impairment? No    Asiprin use counseling: Does not need ASA (and currently is not on it)      Recent Lab Results:    Visual Acuity:  No exam data present    Age-appropriate Screening Schedule:  Refer to the list below for future screening recommendations based on patient's age, sex and/or medical conditions. Orders for these recommended tests are listed in the plan section. The patient has been provided with a written plan.    Health Maintenance   Topic Date Due   • TDAP/TD VACCINES (1 - Tdap) 05/05/1957   • DXA SCAN  11/05/2017   • LIPID PANEL  07/11/2018   • PNEUMOCOCCAL VACCINES (65+ LOW/MEDIUM RISK) (2 of 2 - PPSV23) 12/21/2018   • COLONOSCOPY  08/08/2019   • INFLUENZA VACCINE  Completed   • ZOSTER VACCINE  Completed        Subjective   History of Present Illness    Bernard Resendez is a 77 y.o. male who presents for an Annual Wellness Visit.    Patient is also here for health maintenance exam.  As well as to discuss some issues.  Patient is in need of screening for prostate cancer.  He tries to maintain healthy diet and exercise regimen.  He has a stable past medical history of insomnia,  hyperlipidemia, elevated glucose levels, vitamin D and vitamin B12 deficiency.  He is in need of blood work at the recommendation of his gastroenterologist to evaluate some of these issues.  He is currently taking and tolerating vitamin B12, atenolol, lisinopril, balsalazide, and Ambien 5 mg nightly.  He is in need of refills.    The following portions of the patient's history were reviewed and updated as appropriate: allergies, current medications, past family history, past medical history, past social history, past surgical history and problem list.    Outpatient Medications Prior to Visit   Medication Sig Dispense Refill   • atenolol (TENORMIN) 50 MG tablet Take 1 tablet by mouth Daily. 90 tablet 3   • balsalazide (COLAZAL) 750 MG capsule Take 3 capsules by mouth 3 (Three) Times a Day. 270 capsule 11   • Calcium-Magnesium-Vitamin D (CALCIUM 500 PO) Take 2 tablets by mouth daily.     • Cholecalciferol (VITAMIN D3) 1000 UNITS capsule Take by mouth daily.     • fluocinolone (SYNALAR) 0.025 % cream      • lisinopril (PRINIVIL,ZESTRIL) 40 MG tablet Take 1 tablet by mouth Daily. 90 tablet 3   • Multiple Vitamins-Minerals (CENTRUM SILVER ADULT 50+ PO) Take by mouth.     • Omega-3 Fatty Acids (FISH OIL) 1200 MG capsule capsule Take by mouth.     • vitamin B-12 (CYANOCOBALAMIN) 1000 MCG tablet Take by mouth.     • zolpidem (AMBIEN) 5 MG tablet TAKE ONE TABLET BY MOUTH AT NIGHT AS NEEDED FOR SLEEP 30 tablet 4     No facility-administered medications prior to visit.        Patient Active Problem List   Diagnosis   • Chronic kidney disease, stage III (moderate)   • Gout   • Hyperlipidemia   • Hypogonadism in male   • Insomnia d/t anxiety   • Osteopenia   • Ulcerative colitis   • Hypertension   • Colon polyps       Advance Care Planning:  has an advance directive - a copy HAS NOT been provided. Have asked the patient to send this to us to add to record.    Identification of Risk Factors:  Risk factors include: None.    Review  "of Systems   Constitutional: Negative for activity change.   All other systems reviewed and are negative.      Compared to one year ago, the patient feels his physical health is the same.  Compared to one year ago, the patient feels his mental health is the same.    Objective     Physical Exam   Constitutional: He is oriented to person, place, and time. He appears well-developed and well-nourished. No distress.   HENT:   Head: Normocephalic and atraumatic.   Right Ear: External ear normal.   Left Ear: External ear normal.   Nose: Nose normal.   Mouth/Throat: Oropharynx is clear and moist.   Eyes: Conjunctivae and EOM are normal. Pupils are equal, round, and reactive to light. Right eye exhibits no discharge. Left eye exhibits no discharge. No scleral icterus.   Neck: Normal range of motion. Neck supple.   Cardiovascular: Normal rate, regular rhythm and normal heart sounds.  Exam reveals no friction rub.    No murmur heard.  Pulmonary/Chest: Effort normal and breath sounds normal. No respiratory distress. He has no wheezes. He has no rales.   Abdominal: Soft. Bowel sounds are normal. He exhibits no distension. There is no tenderness. There is no rebound and no guarding.   Lymphadenopathy:     He has no cervical adenopathy.   Neurological: He is alert and oriented to person, place, and time.   Skin: Skin is warm and dry. He is not diaphoretic.   Nursing note and vitals reviewed.      Vitals:    02/15/18 1034   BP: 122/78   BP Location: Right arm   Patient Position: Sitting   Cuff Size: Adult   Pulse: 54   Resp: 14   Temp: 98.6 °F (37 °C)   TempSrc: Oral   SpO2: 98%   Weight: 106 kg (233 lb)   Height: 180.3 cm (71\")       Body mass index is 32.5 kg/(m^2).  Discussed the patient's BMI with him. BMI is within normal parameters. No follow-up required.    Assessment/Plan   Patient Self-Management and Personalized Health Advice  The patient has been provided with information about: prevention of cardiac or vascular disease " and fall prevention and preventive services including:   · Diabetes screening, see lab orders, Nutrition counseling provided, Prostate cancer screening discussed.    Visit Diagnoses:    ICD-10-CM ICD-9-CM   1. Encounter for health maintenance examination Z00.00 V70.0   2. Medicare annual wellness visit, initial Z00.00 V70.0   3. Screening for prostate cancer Z12.5 V76.44   4. Insomnia, unspecified type G47.00 780.52   5. Hyperlipidemia, unspecified hyperlipidemia type E78.5 272.4   6. Elevated glucose level R73.09 790.29   7. B12 deficiency E53.8 266.2   8. Vitamin D deficiency E55.9 268.9       Orders Placed This Encounter   Procedures   • CBC Auto Differential   • Lipid Panel   • Vitamin B12   • Folate   • Comprehensive Metabolic Panel   • Vitamin D 25 Hydroxy   • PSA Screen       Outpatient Encounter Prescriptions as of 2/15/2018   Medication Sig Dispense Refill   • atenolol (TENORMIN) 50 MG tablet Take 1 tablet by mouth Daily. 90 tablet 3   • balsalazide (COLAZAL) 750 MG capsule Take 3 capsules by mouth 3 (Three) Times a Day. 270 capsule 11   • Calcium-Magnesium-Vitamin D (CALCIUM 500 PO) Take 2 tablets by mouth daily.     • Cholecalciferol (VITAMIN D3) 1000 UNITS capsule Take by mouth daily.     • fluocinolone (SYNALAR) 0.025 % cream      • lisinopril (PRINIVIL,ZESTRIL) 40 MG tablet Take 1 tablet by mouth Daily. 90 tablet 3   • Multiple Vitamins-Minerals (CENTRUM SILVER ADULT 50+ PO) Take by mouth.     • Omega-3 Fatty Acids (FISH OIL) 1200 MG capsule capsule Take by mouth.     • vitamin B-12 (CYANOCOBALAMIN) 1000 MCG tablet Take by mouth.     • zolpidem (AMBIEN) 5 MG tablet Take 1 tablet by mouth At Night As Needed for Sleep. 30 tablet 5   • [DISCONTINUED] zolpidem (AMBIEN) 5 MG tablet TAKE ONE TABLET BY MOUTH AT NIGHT AS NEEDED FOR SLEEP 30 tablet 4     No facility-administered encounter medications on file as of 2/15/2018.        Reviewed use of high risk medication in the elderly: yes  Reviewed for potential  of harmful drug interactions in the elderly: yes    Preventative health maintenance and screening as above.  We'll get blood work as above to evaluate current status of these chronic medical problems.  Refills given for his Ambien.  A BLACK was run which showed no abnormalities.  Advised the patient to keep up the good work and teach make improvements in diet and exercise.        Follow Up:  Return in about 3 months (around 5/15/2018) for Recheck.     An After Visit Summary and PPPS with all of these plans were given to the patient.

## 2018-03-01 ENCOUNTER — TELEPHONE (OUTPATIENT)
Dept: FAMILY MEDICINE CLINIC | Facility: CLINIC | Age: 78
End: 2018-03-01

## 2018-03-02 DIAGNOSIS — Z12.5 SCREENING FOR PROSTATE CANCER: Primary | ICD-10-CM

## 2018-03-07 ENCOUNTER — RESULTS ENCOUNTER (OUTPATIENT)
Dept: FAMILY MEDICINE CLINIC | Facility: CLINIC | Age: 78
End: 2018-03-07

## 2018-03-07 DIAGNOSIS — Z12.5 SCREENING FOR PROSTATE CANCER: ICD-10-CM

## 2018-03-13 ENCOUNTER — TELEPHONE (OUTPATIENT)
Dept: FAMILY MEDICINE CLINIC | Facility: CLINIC | Age: 78
End: 2018-03-13

## 2018-03-15 LAB — PSA SERPL-MCNC: 4.42 NG/ML (ref 0–4)

## 2018-04-04 ENCOUNTER — RESULTS ENCOUNTER (OUTPATIENT)
Dept: GASTROENTEROLOGY | Facility: CLINIC | Age: 78
End: 2018-04-04

## 2018-04-04 ENCOUNTER — TELEPHONE (OUTPATIENT)
Dept: GASTROENTEROLOGY | Facility: CLINIC | Age: 78
End: 2018-04-04

## 2018-04-04 DIAGNOSIS — R19.7 DIARRHEA, UNSPECIFIED TYPE: ICD-10-CM

## 2018-04-04 DIAGNOSIS — K51.919 ULCERATIVE COLITIS WITH COMPLICATION, UNSPECIFIED LOCATION (HCC): Primary | ICD-10-CM

## 2018-04-04 DIAGNOSIS — K51.919 ULCERATIVE COLITIS WITH COMPLICATION, UNSPECIFIED LOCATION (HCC): ICD-10-CM

## 2018-04-04 NOTE — TELEPHONE ENCOUNTER
----- Message from Rajiv Koenig sent at 4/4/2018 10:00 AM EDT -----  Regarding: pt called   Contact: [phone]  Pt is calling stating he is having a flare up of ulcer colitis & would like to speak with someone

## 2018-04-04 NOTE — TELEPHONE ENCOUNTER
Id have him come in for labs and stool studies to confirm its a UC flare. Has he been on ABX lately? Is he taking the balsalazide 3 tabs TID? Once we get the stool tests done we can add budesonide for the acute flare. Ill place orders for labs and stools. Thanks.

## 2018-04-04 NOTE — TELEPHONE ENCOUNTER
Patient called, advised as per Emely's note. Patient states he has not been on any antibiotics and has not missed a dose of his Balsalazide.  Patient scheduled for lab work and stool test on 4/5/18@1000. Patient verb understanding and is agreeable to the plan.

## 2018-04-04 NOTE — TELEPHONE ENCOUNTER
Returned patient's phone call. He states he has not had a flare in 2-3 years. States he started about a week ago with mucus and blood present with each BM. States he is having 1-2 BM's a day. Denies any fever. States Balsalazien 750 mg 3 capsules by mouth 3 times a day. Questions what he can do to help stop the flare? Advised will send an update to SHELLIE Han for advisement. He verb understanding and is agreeable to the plan.

## 2018-04-09 ENCOUNTER — RESULTS ENCOUNTER (OUTPATIENT)
Dept: GASTROENTEROLOGY | Facility: CLINIC | Age: 78
End: 2018-04-09

## 2018-04-09 DIAGNOSIS — R19.7 DIARRHEA, UNSPECIFIED TYPE: ICD-10-CM

## 2018-04-09 DIAGNOSIS — K51.919 ULCERATIVE COLITIS WITH COMPLICATION, UNSPECIFIED LOCATION (HCC): ICD-10-CM

## 2018-04-16 LAB
BACTERIA SPEC CULT: NORMAL
BACTERIA SPEC CULT: NORMAL
C DIFF TOX GENS STL QL NAA+PROBE: NEGATIVE
CAMPYLOBACTER STL CULT: NORMAL
E COLI SXT STL QL IA: NEGATIVE
G LAMBLIA AG STL QL IA: NEGATIVE
LACTOFERRIN STL-MCNC: 2.66 UG/ML(G) (ref 0–7.24)
O+P SPEC MICRO: NORMAL
O+P STL CONC: NORMAL
SALM + SHIG STL CULT: NORMAL

## 2018-04-17 ENCOUNTER — TELEPHONE (OUTPATIENT)
Dept: GASTROENTEROLOGY | Facility: CLINIC | Age: 78
End: 2018-04-17

## 2018-04-17 NOTE — TELEPHONE ENCOUNTER
----- Message from Buddy Winn sent at 4/17/2018 10:58 AM EDT -----  Regarding: labs   Contact: [phone]  PT CALLED FOR LABS REPORT

## 2018-04-17 NOTE — TELEPHONE ENCOUNTER
Notes Recorded by ANTELMO Mccabe on 4/17/2018 at 8:26 AM EDT  Please call patient and let him know his stool studies were all negative except for the Lactoferrin. It is elevated which means inflammation. So he is most likely suffering from UC flare. What he is taking now? Budesonide 3 mg 3 tabs daily? Is he also taking Colazol too? What dose? Thanks.

## 2018-04-18 NOTE — TELEPHONE ENCOUNTER
I would start him on budesonide 9 mg daily or uceris 9 mg daily x 6 weeks and he will need follow up appt with me or Md. Have him call us back in 1-2 weeks with update. Thanks.

## 2018-04-18 NOTE — TELEPHONE ENCOUNTER
Patient called, states his medication was going to be extremely pricey. Asked if it could be changed. Advised will call his  Pharmacy.   Called [facility] pharmacy spoke with Milton. Ordered Budesonide 3 mg 3 tablets daily x 6 weeks. Oop cost for 28 days is $352. Called patient advised of the oop cost. He states he will purchase the medication and start it tomorrow.

## 2018-04-18 NOTE — TELEPHONE ENCOUNTER
Pt returned call per a staff message.    Called pt back. Advised of note from Emely. Pt verb understanding and states he does not take budesonide; he does not remember ever taking it. Pt states he is currently taking balsalazide 3 tablets TID. Advised will update Emely and call back with recommendations. Pt verb understanding.

## 2018-04-18 NOTE — TELEPHONE ENCOUNTER
Patient called, advised as per Emely he is to start Budesonide 9 mg(Uceris) one tablet daily for 6 weeks. Advised he will need to call back in 1-2 weeks with an update. F/u office visit with Misite NP on 5/31. Patient verb understanding and is agreeable to the plan.

## 2018-04-26 NOTE — TELEPHONE ENCOUNTER
Returned patient's phone call. He states he is feeling better. States he has not passed any blood for several days. He states his bowels are still loose but even that is starting to slow down. He states he is pleased with his overall progress. Advised will send an update to SHELLIE Han.

## 2018-05-04 ENCOUNTER — TELEPHONE (OUTPATIENT)
Dept: FAMILY MEDICINE CLINIC | Facility: CLINIC | Age: 78
End: 2018-05-04

## 2018-05-14 ENCOUNTER — TELEPHONE (OUTPATIENT)
Dept: GASTROENTEROLOGY | Facility: CLINIC | Age: 78
End: 2018-05-14

## 2018-05-14 RX ORDER — PREDNISONE 10 MG/1
TABLET ORAL
Qty: 126 TABLET | Refills: 0 | Status: SHIPPED | OUTPATIENT
Start: 2018-05-14 | End: 2018-06-20

## 2018-05-14 NOTE — TELEPHONE ENCOUNTER
"Per Dr Gifford: \"Prednisone 10 mg tablets   40 mg by mouth daily for 7 days   35 mg by mouth daily for 7 days   30 mg by mouth daily for 7 days   25 mg by mouth daily for 7 days   Plan milligrams by mouth daily for 7 days   15 mg by mouth daily for 7 days   10 mg by mouth daily for 7 days     #QS for taper   5 mg by mouth daily for 7 days     If  He has not feeling better within 1 week on the prednisone have him call us'     Office visit Emely next available     If he continues to have trouble with flaring I would perform colonoscopy and then probably move to biologic or immunomodulator   thx\"   ___________________________________________    Per a staff message from MD: \"I did not realize that Bernard was a left-sided colitis only.  Let's do the steroid taper, but if he flares again we would not move to a biologic, we would add a rectal preparation such as rowasa enema or steroid enema     Have him discontinue the budesonide and begin the steroid taper     Tell him if he continues to have trouble after he comes off the steroids I  am going to add steroid enema and Rowasa  for 8 weeks\"   "

## 2018-05-14 NOTE — TELEPHONE ENCOUNTER
Called pt back. Pt states he had a flare up from U/C a little over a month ago and he was prescribed budesonide. He's been on this med for about one month at 3 capsules per day and he's not seeing much improvement. Pt states he is still having diarrhea 4-6 times per day and he is seeing mucous and blood in the stool. Pt denies abdominal pain, nausea, vomiting, fever and chills. He states his stool tests were all negative when this started so he's not sure what is going on. He is still taking the balsalazide 3 PO TID along with the budesonide and thought he'd be better by now. Advised will update Emely and call back with recs. Pt verb understanding.

## 2018-05-14 NOTE — TELEPHONE ENCOUNTER
I would think if the budesonide is not working then consider changing to prednisone or doing a colonoscopy. I would ask Dr. Gifford what he thinks we should do next. Last Colonoscopy was 8/8/2017 and it showed HP polyps but negative for any acute inflammation. He didn't have these symptoms then. Stool studies 2/2018 were negative. Labs were also negative. Thanks.

## 2018-05-14 NOTE — TELEPHONE ENCOUNTER
----- Message from Rajiv Koenig sent at 5/14/2018 11:20 AM EDT -----  Regarding: Medication   Contact: [phone]  Pt is calling stating he is having trouble with the lack of results from the medication (budesonide), he would like a call back.    Cell#[phone]

## 2018-05-31 ENCOUNTER — OFFICE VISIT (OUTPATIENT)
Dept: GASTROENTEROLOGY | Facility: CLINIC | Age: 78
End: 2018-05-31

## 2018-05-31 VITALS
BODY MASS INDEX: 32.2 KG/M2 | TEMPERATURE: 97.7 F | SYSTOLIC BLOOD PRESSURE: 136 MMHG | DIASTOLIC BLOOD PRESSURE: 82 MMHG | HEIGHT: 71 IN | WEIGHT: 230 LBS

## 2018-05-31 DIAGNOSIS — K62.5 RECTAL BLEEDING: ICD-10-CM

## 2018-05-31 DIAGNOSIS — R15.9 FULL INCONTINENCE OF FECES: ICD-10-CM

## 2018-05-31 DIAGNOSIS — K51.80 OTHER ULCERATIVE COLITIS WITHOUT COMPLICATION (HCC): Primary | ICD-10-CM

## 2018-05-31 DIAGNOSIS — R19.7 DIARRHEA, UNSPECIFIED TYPE: ICD-10-CM

## 2018-05-31 PROCEDURE — 99214 OFFICE O/P EST MOD 30 MIN: CPT | Performed by: NURSE PRACTITIONER

## 2018-06-20 ENCOUNTER — OFFICE VISIT (OUTPATIENT)
Dept: FAMILY MEDICINE CLINIC | Facility: CLINIC | Age: 78
End: 2018-06-20

## 2018-06-20 VITALS
HEIGHT: 71 IN | WEIGHT: 227.2 LBS | TEMPERATURE: 97.6 F | OXYGEN SATURATION: 97 % | HEART RATE: 69 BPM | DIASTOLIC BLOOD PRESSURE: 62 MMHG | BODY MASS INDEX: 31.81 KG/M2 | SYSTOLIC BLOOD PRESSURE: 142 MMHG

## 2018-06-20 DIAGNOSIS — J40 BRONCHITIS: ICD-10-CM

## 2018-06-20 DIAGNOSIS — H10.9 CONJUNCTIVITIS OF BOTH EYES, UNSPECIFIED CONJUNCTIVITIS TYPE: Primary | ICD-10-CM

## 2018-06-20 PROBLEM — J32.9 SINUSITIS: Status: ACTIVE | Noted: 2018-06-20

## 2018-06-20 PROCEDURE — 99213 OFFICE O/P EST LOW 20 MIN: CPT | Performed by: NURSE PRACTITIONER

## 2018-06-20 RX ORDER — METHYLPREDNISOLONE 4 MG/1
TABLET ORAL
Qty: 1 EACH | Refills: 0 | Status: SHIPPED | OUTPATIENT
Start: 2018-06-20 | End: 2018-07-13

## 2018-06-20 RX ORDER — TOBRAMYCIN 3 MG/ML
2 SOLUTION/ DROPS OPHTHALMIC
Qty: 1 BOTTLE | Refills: 0 | Status: SHIPPED | OUTPATIENT
Start: 2018-06-20 | End: 2018-06-27 | Stop reason: SDUPTHER

## 2018-06-20 RX ORDER — DOXYCYCLINE HYCLATE 100 MG
100 TABLET ORAL 2 TIMES DAILY
Qty: 20 TABLET | Refills: 0 | Status: SHIPPED | OUTPATIENT
Start: 2018-06-20 | End: 2018-07-13

## 2018-06-20 NOTE — PROGRESS NOTES
"Subjective   Bernard Resendez is a 78 y.o. male.     History of Present Illness   Patient presenting to the office today with concerns over bilateral bacterial infections of his eyes.  For the last 2 days is woken up with eyelashes matted and yellow drainage.  He's also been coughing shortness of breath and wheezing for the past 4 days he just got back from [place].  He's not using any over-the-counter medications.  The following portions of the patient's history were reviewed and updated as appropriate: allergies, current medications, past social history and problem list.    Review of Systems   HENT: Positive for congestion.    Eyes: Positive for pain, discharge, redness and itching.   Respiratory: Positive for cough.    All other systems reviewed and are negative.      Objective   /62 (BP Location: Left arm, Patient Position: Sitting)   Pulse 69   Temp 97.6 °F (36.4 °C)   Ht 180.3 cm (70.98\")   Wt 103 kg (227 lb 3.2 oz)   SpO2 97%   BMI 31.70 kg/m²   Physical Exam   Constitutional: He is oriented to person, place, and time. Vital signs are normal. He appears well-developed and well-nourished. No distress.   HENT:   Head: Normocephalic.   Eyes:       Cardiovascular: Normal rate, regular rhythm and normal heart sounds.    Pulmonary/Chest: Effort normal and breath sounds normal.   Neurological: He is alert and oriented to person, place, and time. Gait normal.   Psychiatric: He has a normal mood and affect. His behavior is normal. Judgment and thought content normal.   Vitals reviewed.      Assessment/Plan   Problem List Items Addressed This Visit        Respiratory    Bronchitis    Relevant Medications    MethylPREDNISolone (MEDROL, LESTER,) 4 MG tablet    doxycycline (VIBRAMYICN) 100 MG tablet       Other    Conjunctivitis of both eyes - Primary    Relevant Medications    tobramycin (TOBREX) 0.3 % solution ophthalmic solution      rto prn       "

## 2018-06-22 NOTE — TELEPHONE ENCOUNTER
Pt wife is calling as well, they are requested rx if possible and to be called for a suggestion if not. Please advise.

## 2018-06-22 NOTE — TELEPHONE ENCOUNTER
Called and spoke to patients wife and she said patient has had a cough for the past 8 days coughing, wheezing has mucus having difficulty coughing up the mucus. He is having pain on the left and right side abdomen when coughing. She said she would try and get ahold of her  to find out any other information that might be helpful

## 2018-06-22 NOTE — TELEPHONE ENCOUNTER
Pt has a constant cough which is now causing pain in his lower abdomen. Pt requesting phone call when possible?

## 2018-06-22 NOTE — TELEPHONE ENCOUNTER
Patient stated that he was already prescribed a steroid pack and wants something to suppress his cough per lois promethazine with codeine 1 tsp q 8 hours prn qty 240ml

## 2018-06-25 RX ORDER — PROMETHAZINE HYDROCHLORIDE AND CODEINE PHOSPHATE 6.25; 1 MG/5ML; MG/5ML
SYRUP ORAL
Qty: 240 ML | Refills: 0 | OUTPATIENT
Start: 2018-06-25 | End: 2018-12-03

## 2018-06-27 ENCOUNTER — TELEPHONE (OUTPATIENT)
Dept: FAMILY MEDICINE CLINIC | Facility: CLINIC | Age: 78
End: 2018-06-27

## 2018-06-27 DIAGNOSIS — H10.9 CONJUNCTIVITIS OF BOTH EYES, UNSPECIFIED CONJUNCTIVITIS TYPE: ICD-10-CM

## 2018-06-27 RX ORDER — TOBRAMYCIN 3 MG/ML
2 SOLUTION/ DROPS OPHTHALMIC
Qty: 1 BOTTLE | Refills: 0 | Status: SHIPPED | OUTPATIENT
Start: 2018-06-27 | End: 2018-12-03

## 2018-06-27 NOTE — TELEPHONE ENCOUNTER
Patient left message stating lois diagnosed him with an eye infection a few days ago he finished the antibiotic but still has discharge on his eye lids he is wanting to get another prescription

## 2018-07-13 ENCOUNTER — OFFICE VISIT (OUTPATIENT)
Dept: FAMILY MEDICINE CLINIC | Facility: CLINIC | Age: 78
End: 2018-07-13

## 2018-07-13 VITALS
HEIGHT: 71 IN | OXYGEN SATURATION: 98 % | WEIGHT: 227.2 LBS | TEMPERATURE: 98.3 F | SYSTOLIC BLOOD PRESSURE: 140 MMHG | DIASTOLIC BLOOD PRESSURE: 72 MMHG | RESPIRATION RATE: 14 BRPM | HEART RATE: 56 BPM | BODY MASS INDEX: 31.81 KG/M2

## 2018-07-13 DIAGNOSIS — J40 SINOBRONCHITIS: Primary | ICD-10-CM

## 2018-07-13 DIAGNOSIS — J32.9 SINOBRONCHITIS: Primary | ICD-10-CM

## 2018-07-13 PROCEDURE — 99214 OFFICE O/P EST MOD 30 MIN: CPT | Performed by: FAMILY MEDICINE

## 2018-07-13 RX ORDER — AMOXICILLIN AND CLAVULANATE POTASSIUM 875; 125 MG/1; MG/1
1 TABLET, FILM COATED ORAL 2 TIMES DAILY
Qty: 20 TABLET | Refills: 0 | Status: SHIPPED | OUTPATIENT
Start: 2018-07-13 | End: 2018-07-23

## 2018-07-13 RX ORDER — PREDNISONE 20 MG/1
40 TABLET ORAL DAILY
Qty: 10 TABLET | Refills: 0 | Status: SHIPPED | OUTPATIENT
Start: 2018-07-13 | End: 2018-10-04

## 2018-07-13 RX ORDER — ALBUTEROL SULFATE 90 UG/1
2 AEROSOL, METERED RESPIRATORY (INHALATION) EVERY 4 HOURS PRN
Qty: 1 INHALER | Refills: 0 | Status: SHIPPED | OUTPATIENT
Start: 2018-07-13 | End: 2019-02-14

## 2018-07-13 NOTE — PROGRESS NOTES
Bernard Resendez is a 78 y.o. male.     Chief Complaint   Patient presents with   • Cough     Patient had seen lois last week she diagnosed him with Bronchitis. He is still having a cough and congestion.        HPI     Patient presents the office today to discuss problem that is new to me.  Patient for the last 4 weeks is been suffering from increased sinus congestion, bilateral ear fullness, cough, and congestion.  He's got a productive green sputum.  Also just feels kind of a general malaise.  He was seen by the nurse practitioner in our office roughly 3 weeks ago and prescribed a Medrol Dosepak and doxycycline.  He took these medications but did not feel like they were overly helpful.    The following portions of the patient's history were reviewed and updated as appropriate: allergies, current medications, past family history, past medical history, past social history, past surgical history and problem list.    Review of Systems   HENT: Positive for congestion.    Respiratory: Positive for cough.    All other systems reviewed and are negative.      Objective  Vitals:    07/13/18 1322   BP: 140/72   Pulse: 56   Resp: 14   Temp: 98.3 °F (36.8 °C)   SpO2: 98%       Physical Exam   Constitutional: He is oriented to person, place, and time. He appears well-developed and well-nourished. No distress.   HENT:   Head: Normocephalic and atraumatic.   Right Ear: External ear and ear canal normal. No drainage, swelling or tenderness. Tympanic membrane is bulging. Tympanic membrane is not injected and not erythematous. Tympanic membrane mobility is normal. No middle ear effusion. Decreased hearing is noted.   Left Ear: External ear and ear canal normal. No drainage, swelling or tenderness. Tympanic membrane is bulging. Tympanic membrane is not injected and not erythematous. Tympanic membrane mobility is normal.  No middle ear effusion. Decreased hearing is noted.   Nose: Mucosal edema and rhinorrhea present. Right sinus  exhibits maxillary sinus tenderness and frontal sinus tenderness. Left sinus exhibits maxillary sinus tenderness and frontal sinus tenderness.   Mouth/Throat: Uvula is midline. Posterior oropharyngeal erythema present. No oropharyngeal exudate, posterior oropharyngeal edema or tonsillar abscesses. No tonsillar exudate.   Eyes: Conjunctivae and EOM are normal. Pupils are equal, round, and reactive to light. Right eye exhibits no discharge. Left eye exhibits no discharge. No scleral icterus.   Neck: Normal range of motion. Neck supple.   Cardiovascular: Normal rate, regular rhythm and normal heart sounds.  Exam reveals no friction rub.    No murmur heard.  Pulmonary/Chest: Effort normal and breath sounds normal. No respiratory distress. He has no wheezes. He has no rales.   Abdominal: Soft. Bowel sounds are normal. He exhibits no distension. There is no tenderness. There is no rebound and no guarding.   Lymphadenopathy:     He has no cervical adenopathy.   Neurological: He is alert and oriented to person, place, and time.   Skin: Skin is warm and dry. He is not diaphoretic.   Nursing note and vitals reviewed.        Current Outpatient Prescriptions:   •  atenolol (TENORMIN) 50 MG tablet, Take 1 tablet by mouth Daily., Disp: 90 tablet, Rfl: 3  •  balsalazide (COLAZAL) 750 MG capsule, Take 3 capsules by mouth 3 (Three) Times a Day., Disp: 270 capsule, Rfl: 11  •  Calcium-Magnesium-Vitamin D (CALCIUM 500 PO), Take 2 tablets by mouth daily., Disp: , Rfl:   •  Cholecalciferol (VITAMIN D3) 1000 UNITS capsule, Take by mouth daily., Disp: , Rfl:   •  fluocinolone (SYNALAR) 0.025 % cream, , Disp: , Rfl:   •  lisinopril (PRINIVIL,ZESTRIL) 40 MG tablet, Take 1 tablet by mouth Daily., Disp: 90 tablet, Rfl: 3  •  Multiple Vitamins-Minerals (CENTRUM SILVER ADULT 50+ PO), Take by mouth., Disp: , Rfl:   •  Omega-3 Fatty Acids (FISH OIL) 1200 MG capsule capsule, Take by mouth., Disp: , Rfl:   •  tobramycin (TOBREX) 0.3 % solution  ophthalmic solution, Administer 2 drops to both eyes Every 4 (Four) Hours., Disp: 1 bottle, Rfl: 0  •  vitamin B-12 (CYANOCOBALAMIN) 1000 MCG tablet, Take by mouth., Disp: , Rfl:   •  zolpidem (AMBIEN) 5 MG tablet, Take 1 tablet by mouth At Night As Needed for Sleep., Disp: 30 tablet, Rfl: 5  •  albuterol (PROVENTIL HFA;VENTOLIN HFA) 108 (90 Base) MCG/ACT inhaler, Inhale 2 puffs Every 4 (Four) Hours As Needed for Wheezing., Disp: 1 inhaler, Rfl: 0  •  amoxicillin-clavulanate (AUGMENTIN) 875-125 MG per tablet, Take 1 tablet by mouth 2 (Two) Times a Day for 10 days. 1 tab PO BID x 10 days for infection, Disp: 20 tablet, Rfl: 0  •  predniSONE (DELTASONE) 20 MG tablet, Take 2 tablets by mouth Daily., Disp: 10 tablet, Rfl: 0  •  promethazine-codeine (PHENERGAN with CODEINE) 6.25-10 MG/5ML syrup, 1 tsp every 8 hours prn, Disp: 240 mL, Rfl: 0    Procedures    Lab Results (most recent)     None              Bernard was seen today for cough.    Diagnoses and all orders for this visit:    Sinobronchitis  -     amoxicillin-clavulanate (AUGMENTIN) 875-125 MG per tablet; Take 1 tablet by mouth 2 (Two) Times a Day for 10 days. 1 tab PO BID x 10 days for infection  -     predniSONE (DELTASONE) 20 MG tablet; Take 2 tablets by mouth Daily.  -     albuterol (PROVENTIL HFA;VENTOLIN HFA) 108 (90 Base) MCG/ACT inhaler; Inhale 2 puffs Every 4 (Four) Hours As Needed for Wheezing.    Extensive conversation had with the patient regarding his symptoms.  I reviewed the medical records from his previous visit.  We'll switch up his antibiotics to Augmentin as well as a 5 day steroid of the same dose.  We'll also add albuterol.  We'll monitor closely.  Patient will contact our office Monday to let us know how things are going.      Return in about 1 week (around 7/20/2018) for Recheck.      Laci Mcnair MD

## 2018-07-16 ENCOUNTER — TELEPHONE (OUTPATIENT)
Dept: FAMILY MEDICINE CLINIC | Facility: CLINIC | Age: 78
End: 2018-07-16

## 2018-07-16 NOTE — TELEPHONE ENCOUNTER
I would see how he does just on the steroid without the Augmentin.  I would be hesitant to give him any other antibiotics while he is actively having diarrhea.  Please have him contact us should his symptoms get worse or not improve.  Thank you.

## 2018-07-16 NOTE — TELEPHONE ENCOUNTER
Pt said he stopped taking Augmentin it gave him diarrhea. He is only using steroid. He took Augmentin for 2 days he feels better he needs an advise either take a different Antibiotic or only stay with prednisone?!

## 2018-08-30 DIAGNOSIS — G47.00 INSOMNIA, UNSPECIFIED TYPE: ICD-10-CM

## 2018-08-30 RX ORDER — ZOLPIDEM TARTRATE 5 MG/1
TABLET ORAL
Qty: 30 TABLET | Refills: 4 | Status: CANCELLED | OUTPATIENT
Start: 2018-08-30

## 2018-08-31 DIAGNOSIS — G47.00 INSOMNIA, UNSPECIFIED TYPE: ICD-10-CM

## 2018-08-31 RX ORDER — ZOLPIDEM TARTRATE 5 MG/1
5 TABLET ORAL NIGHTLY PRN
Qty: 30 TABLET | Refills: 3 | OUTPATIENT
Start: 2018-08-31 | End: 2018-12-23 | Stop reason: SDUPTHER

## 2018-09-06 ENCOUNTER — OFFICE VISIT (OUTPATIENT)
Dept: FAMILY MEDICINE CLINIC | Facility: CLINIC | Age: 78
End: 2018-09-06

## 2018-09-06 VITALS
WEIGHT: 233 LBS | TEMPERATURE: 97.6 F | BODY MASS INDEX: 32.62 KG/M2 | HEART RATE: 53 BPM | DIASTOLIC BLOOD PRESSURE: 90 MMHG | OXYGEN SATURATION: 96 % | SYSTOLIC BLOOD PRESSURE: 162 MMHG | HEIGHT: 71 IN

## 2018-09-06 DIAGNOSIS — N18.30 CHRONIC KIDNEY DISEASE, STAGE III (MODERATE) (HCC): ICD-10-CM

## 2018-09-06 DIAGNOSIS — M25.552 PAIN OF LEFT HIP JOINT: ICD-10-CM

## 2018-09-06 DIAGNOSIS — R60.0 BILATERAL LOWER EXTREMITY EDEMA: ICD-10-CM

## 2018-09-06 DIAGNOSIS — R97.20 ELEVATED PSA: ICD-10-CM

## 2018-09-06 DIAGNOSIS — R35.1 NOCTURIA: ICD-10-CM

## 2018-09-06 DIAGNOSIS — R73.09 ELEVATED GLUCOSE: ICD-10-CM

## 2018-09-06 DIAGNOSIS — I10 ESSENTIAL HYPERTENSION: ICD-10-CM

## 2018-09-06 DIAGNOSIS — E78.5 HYPERLIPIDEMIA, UNSPECIFIED HYPERLIPIDEMIA TYPE: Primary | ICD-10-CM

## 2018-09-06 PROCEDURE — 99214 OFFICE O/P EST MOD 30 MIN: CPT | Performed by: FAMILY MEDICINE

## 2018-09-06 NOTE — PROGRESS NOTES
Bernard Resendez is a 78 y.o. male.     Chief Complaint   Patient presents with   • Hyperglycemia     follow up pt due for blood work   • Edema     swelling in feet for about a month at night   • Pain     left groin and hip pain and upper leg for about a month       HPI     Patient presents office today to discuss a number of issues.  Patient has a history of hyperlipidemia, hypertension, chronic kidney disease stage III, elevated PSA levels, elevated glucose levels.  He is currently taking and tolerating atenolol, lisinopril for his blood pressure.  Patient was recently taken off of statin therapy due to doing well.  Patient has recently been experiencing bilateral lower extremity edema.  This is been going on for about a month.  He states that he gets worse over the course of the day and resolves somewhat overnight but didn't becomes worse again during the day.  Somewhat painful and tender.  Never had a echocardiogram done.  No shortness of breath or chest pain.  Patient also has been having increased nighttime urinations.  No blood in his urine.  Patient also states that he's been having some left hip pain.  Pain is sharp and stabbing.  Gets worse with movement.  Has an appointment set up with an orthopedic surgeon for consultation.    The following portions of the patient's history were reviewed and updated as appropriate: allergies, current medications, past family history, past medical history, past social history, past surgical history and problem list.    Review of Systems   Genitourinary: Positive for testicular pain (left).   Musculoskeletal: Positive for myalgias.        Left hip pain and upper left leg   All other systems reviewed and are negative.      Objective  Vitals:    09/06/18 1014   BP: 162/90   Pulse: 53   Temp: 97.6 °F (36.4 °C)   SpO2: 96%       Physical Exam   Constitutional: He is oriented to person, place, and time. He appears well-developed and well-nourished. No distress.   HENT:   Head:  Normocephalic and atraumatic.   Right Ear: External ear normal.   Left Ear: External ear normal.   Nose: Nose normal.   Mouth/Throat: Oropharynx is clear and moist.   Eyes: Pupils are equal, round, and reactive to light. Conjunctivae and EOM are normal. Right eye exhibits no discharge. Left eye exhibits no discharge. No scleral icterus.   Cardiovascular: Normal rate, regular rhythm and normal heart sounds.    Pulmonary/Chest: Effort normal and breath sounds normal. No respiratory distress. He has no wheezes. He has no rales.   Abdominal: Soft. Bowel sounds are normal. He exhibits no distension. There is no tenderness.   Neurological: He is alert and oriented to person, place, and time.   Skin: Skin is warm and dry. He is not diaphoretic.   Nursing note and vitals reviewed.        Current Outpatient Prescriptions:   •  albuterol (PROVENTIL HFA;VENTOLIN HFA) 108 (90 Base) MCG/ACT inhaler, Inhale 2 puffs Every 4 (Four) Hours As Needed for Wheezing., Disp: 1 inhaler, Rfl: 0  •  atenolol (TENORMIN) 50 MG tablet, Take 1 tablet by mouth Daily., Disp: 90 tablet, Rfl: 3  •  balsalazide (COLAZAL) 750 MG capsule, Take 3 capsules by mouth 3 (Three) Times a Day., Disp: 270 capsule, Rfl: 11  •  Cholecalciferol (VITAMIN D3) 1000 UNITS capsule, Take by mouth daily., Disp: , Rfl:   •  fluocinolone (SYNALAR) 0.025 % cream, , Disp: , Rfl:   •  lisinopril (PRINIVIL,ZESTRIL) 40 MG tablet, Take 1 tablet by mouth Daily., Disp: 90 tablet, Rfl: 3  •  Multiple Vitamins-Minerals (CENTRUM SILVER ADULT 50+ PO), Take by mouth., Disp: , Rfl:   •  Omega-3 Fatty Acids (FISH OIL) 1200 MG capsule capsule, Take by mouth., Disp: , Rfl:   •  zolpidem (AMBIEN) 5 MG tablet, Take 1 tablet by mouth At Night As Needed for Sleep., Disp: 30 tablet, Rfl: 3  •  Calcium-Magnesium-Vitamin D (CALCIUM 500 PO), Take 2 tablets by mouth daily., Disp: , Rfl:   •  predniSONE (DELTASONE) 20 MG tablet, Take 2 tablets by mouth Daily., Disp: 10 tablet, Rfl: 0  •   promethazine-codeine (PHENERGAN with CODEINE) 6.25-10 MG/5ML syrup, 1 tsp every 8 hours prn, Disp: 240 mL, Rfl: 0  •  tobramycin (TOBREX) 0.3 % solution ophthalmic solution, Administer 2 drops to both eyes Every 4 (Four) Hours., Disp: 1 bottle, Rfl: 0  •  vitamin B-12 (CYANOCOBALAMIN) 1000 MCG tablet, Take by mouth., Disp: , Rfl:     Procedures    Lab Results (most recent)     None              Bernard was seen today for hyperglycemia, edema and pain.    Diagnoses and all orders for this visit:    Hyperlipidemia, unspecified hyperlipidemia type  -     CBC Auto Differential  -     Comprehensive Metabolic Panel  -     Hemoglobin A1c  -     Lipid Panel  -     Thyroid Panel With TSH  -     PSA DIAGNOSTIC  -     Adult Transthoracic Echo Complete W/ Cont if Necessary Per Protocol; Future    Essential hypertension  -     CBC Auto Differential  -     Comprehensive Metabolic Panel  -     Hemoglobin A1c  -     Lipid Panel  -     Thyroid Panel With TSH  -     PSA DIAGNOSTIC  -     Adult Transthoracic Echo Complete W/ Cont if Necessary Per Protocol; Future    Chronic kidney disease, stage III (moderate)  -     CBC Auto Differential  -     Comprehensive Metabolic Panel  -     Hemoglobin A1c  -     Lipid Panel  -     Thyroid Panel With TSH  -     PSA DIAGNOSTIC  -     Adult Transthoracic Echo Complete W/ Cont if Necessary Per Protocol; Future    Bilateral lower extremity edema  -     CBC Auto Differential  -     Comprehensive Metabolic Panel  -     Hemoglobin A1c  -     Lipid Panel  -     Thyroid Panel With TSH  -     PSA DIAGNOSTIC  -     Adult Transthoracic Echo Complete W/ Cont if Necessary Per Protocol; Future    Pain of left hip joint  -     CBC Auto Differential  -     Comprehensive Metabolic Panel  -     Hemoglobin A1c  -     Lipid Panel  -     Thyroid Panel With TSH  -     PSA DIAGNOSTIC  -     Adult Transthoracic Echo Complete W/ Cont if Necessary Per Protocol; Future    Nocturia  -     CBC Auto Differential  -      Comprehensive Metabolic Panel  -     Hemoglobin A1c  -     Lipid Panel  -     Thyroid Panel With TSH  -     PSA DIAGNOSTIC  -     Adult Transthoracic Echo Complete W/ Cont if Necessary Per Protocol; Future    Elevated PSA  -     CBC Auto Differential  -     Comprehensive Metabolic Panel  -     Hemoglobin A1c  -     Lipid Panel  -     Thyroid Panel With TSH  -     PSA DIAGNOSTIC  -     Adult Transthoracic Echo Complete W/ Cont if Necessary Per Protocol; Future    Elevated glucose  -     CBC Auto Differential  -     Comprehensive Metabolic Panel  -     Hemoglobin A1c  -     Lipid Panel  -     Thyroid Panel With TSH  -     PSA DIAGNOSTIC  -     Adult Transthoracic Echo Complete W/ Cont if Necessary Per Protocol; Future     we'll get labs as above to evaluate these issues as above.  We'll get an ultrasound of the heart.  There is a possibility that this is some new onset of congestive heart failure.  Could also be exacerbation of his chronic kidney disease.  We'll communicate lab results to patient when available.  Follow-up in 4 weeks.  Discussed concerning signs and symptoms and reasons for an ER visit.        Return in about 4 weeks (around 10/4/2018) for Recheck.      Laci Mcnair MD

## 2018-09-07 LAB
ALBUMIN SERPL-MCNC: 4.2 G/DL (ref 3.5–5.2)
ALBUMIN/GLOB SERPL: 2 G/DL
ALP SERPL-CCNC: 58 U/L (ref 39–117)
ALT SERPL-CCNC: 10 U/L (ref 1–41)
AST SERPL-CCNC: 20 U/L (ref 1–40)
BASOPHILS # BLD AUTO: 0.02 10*3/MM3 (ref 0–0.2)
BASOPHILS NFR BLD AUTO: 0.3 % (ref 0–1.5)
BILIRUB SERPL-MCNC: 0.6 MG/DL (ref 0.1–1.2)
BUN SERPL-MCNC: 21 MG/DL (ref 8–23)
BUN/CREAT SERPL: 17.8 (ref 7–25)
CALCIUM SERPL-MCNC: 9.2 MG/DL (ref 8.6–10.5)
CHLORIDE SERPL-SCNC: 106 MMOL/L (ref 98–107)
CHOLEST SERPL-MCNC: 228 MG/DL (ref 0–200)
CO2 SERPL-SCNC: 27.5 MMOL/L (ref 22–29)
CREAT SERPL-MCNC: 1.18 MG/DL (ref 0.76–1.27)
EOSINOPHIL # BLD AUTO: 0.35 10*3/MM3 (ref 0–0.7)
EOSINOPHIL NFR BLD AUTO: 4.8 % (ref 0.3–6.2)
ERYTHROCYTE [DISTWIDTH] IN BLOOD BY AUTOMATED COUNT: 13.7 % (ref 11.5–14.5)
FT4I SERPL CALC-MCNC: 2.1 (ref 1.2–4.9)
GLOBULIN SER CALC-MCNC: 2.1 GM/DL
GLUCOSE SERPL-MCNC: 91 MG/DL (ref 65–99)
HBA1C MFR BLD: 4.72 % (ref 4.8–5.6)
HCT VFR BLD AUTO: 44.4 % (ref 40.4–52.2)
HDLC SERPL-MCNC: 50 MG/DL (ref 40–60)
HGB BLD-MCNC: 14.8 G/DL (ref 13.7–17.6)
IMM GRANULOCYTES # BLD: 0.02 10*3/MM3 (ref 0–0.03)
IMM GRANULOCYTES NFR BLD: 0.3 % (ref 0–0.5)
LDLC SERPL CALC-MCNC: 151 MG/DL (ref 0–100)
LYMPHOCYTES # BLD AUTO: 1.55 10*3/MM3 (ref 0.9–4.8)
LYMPHOCYTES NFR BLD AUTO: 21.4 % (ref 19.6–45.3)
MCH RBC QN AUTO: 29.7 PG (ref 27–32.7)
MCHC RBC AUTO-ENTMCNC: 33.3 G/DL (ref 32.6–36.4)
MCV RBC AUTO: 89.2 FL (ref 79.8–96.2)
MONOCYTES # BLD AUTO: 0.54 10*3/MM3 (ref 0.2–1.2)
MONOCYTES NFR BLD AUTO: 7.5 % (ref 5–12)
NEUTROPHILS # BLD AUTO: 4.78 10*3/MM3 (ref 1.9–8.1)
NEUTROPHILS NFR BLD AUTO: 66 % (ref 42.7–76)
PLATELET # BLD AUTO: 203 10*3/MM3 (ref 140–500)
POTASSIUM SERPL-SCNC: 5.3 MMOL/L (ref 3.5–5.2)
PROT SERPL-MCNC: 6.3 G/DL (ref 6–8.5)
PSA SERPL-MCNC: 5.33 NG/ML (ref 0–4)
RBC # BLD AUTO: 4.98 10*6/MM3 (ref 4.6–6)
SODIUM SERPL-SCNC: 145 MMOL/L (ref 136–145)
T3RU NFR SERPL: 28 % (ref 24–39)
T4 SERPL-MCNC: 7.5 UG/DL (ref 4.5–12)
TRIGL SERPL-MCNC: 137 MG/DL (ref 0–150)
TSH SERPL DL<=0.005 MIU/L-ACNC: 2.7 UIU/ML (ref 0.45–4.5)
VLDLC SERPL CALC-MCNC: 27.4 MG/DL (ref 5–40)
WBC # BLD AUTO: 7.24 10*3/MM3 (ref 4.5–10.7)

## 2018-09-11 DIAGNOSIS — R97.20 ELEVATED PSA: Primary | ICD-10-CM

## 2018-09-12 ENCOUNTER — HOSPITAL ENCOUNTER (OUTPATIENT)
Dept: CARDIOLOGY | Facility: HOSPITAL | Age: 78
Discharge: HOME | End: 2018-09-12
Admitting: FAMILY MEDICINE

## 2018-09-12 DIAGNOSIS — R97.20 ELEVATED PSA: ICD-10-CM

## 2018-09-12 DIAGNOSIS — R35.1 NOCTURIA: ICD-10-CM

## 2018-09-12 DIAGNOSIS — R60.0 BILATERAL LOWER EXTREMITY EDEMA: ICD-10-CM

## 2018-09-12 DIAGNOSIS — I10 ESSENTIAL HYPERTENSION: ICD-10-CM

## 2018-09-12 DIAGNOSIS — M25.552 PAIN OF LEFT HIP JOINT: ICD-10-CM

## 2018-09-12 DIAGNOSIS — N18.30 CHRONIC KIDNEY DISEASE, STAGE III (MODERATE) (HCC): ICD-10-CM

## 2018-09-12 DIAGNOSIS — R73.09 ELEVATED GLUCOSE: ICD-10-CM

## 2018-09-12 DIAGNOSIS — E78.5 HYPERLIPIDEMIA, UNSPECIFIED HYPERLIPIDEMIA TYPE: ICD-10-CM

## 2018-09-12 LAB
BH CV ECHO MEAS - ACS: 1.5 CM
BH CV ECHO MEAS - AO MAX PG (FULL): 4.5 MMHG
BH CV ECHO MEAS - AO MAX PG: 8.1 MMHG
BH CV ECHO MEAS - AO MEAN PG (FULL): 2 MMHG
BH CV ECHO MEAS - AO MEAN PG: 4 MMHG
BH CV ECHO MEAS - AO ROOT AREA (BSA CORRECTED): 1.5
BH CV ECHO MEAS - AO ROOT AREA: 8.6 CM^2
BH CV ECHO MEAS - AO ROOT DIAM: 3.3 CM
BH CV ECHO MEAS - AO V2 MAX: 142 CM/SEC
BH CV ECHO MEAS - AO V2 MEAN: 95.2 CM/SEC
BH CV ECHO MEAS - AO V2 VTI: 32.9 CM
BH CV ECHO MEAS - AVA(I,A): 2.4 CM^2
BH CV ECHO MEAS - AVA(I,D): 2.4 CM^2
BH CV ECHO MEAS - AVA(V,A): 2.3 CM^2
BH CV ECHO MEAS - AVA(V,D): 2.3 CM^2
BH CV ECHO MEAS - BSA(HAYCOCK): 2.3 M^2
BH CV ECHO MEAS - BSA: 2.3 M^2
BH CV ECHO MEAS - BZI_BMI: 32.5 KILOGRAMS/M^2
BH CV ECHO MEAS - BZI_METRIC_HEIGHT: 180.3 CM
BH CV ECHO MEAS - BZI_METRIC_WEIGHT: 105.7 KG
BH CV ECHO MEAS - EDV(CUBED): 117.6 ML
BH CV ECHO MEAS - EDV(MOD-SP2): 84 ML
BH CV ECHO MEAS - EDV(MOD-SP4): 97 ML
BH CV ECHO MEAS - EDV(TEICH): 112.8 ML
BH CV ECHO MEAS - EF(CUBED): 66.6 %
BH CV ECHO MEAS - EF(MOD-BP): 68.9 %
BH CV ECHO MEAS - EF(MOD-SP2): 69 %
BH CV ECHO MEAS - EF(MOD-SP4): 70.1 %
BH CV ECHO MEAS - EF(TEICH): 58 %
BH CV ECHO MEAS - ESV(CUBED): 39.3 ML
BH CV ECHO MEAS - ESV(MOD-SP2): 26 ML
BH CV ECHO MEAS - ESV(MOD-SP4): 29 ML
BH CV ECHO MEAS - ESV(TEICH): 47.4 ML
BH CV ECHO MEAS - FS: 30.6 %
BH CV ECHO MEAS - IVS/LVPW: 1.4
BH CV ECHO MEAS - IVSD: 1.4 CM
BH CV ECHO MEAS - LAT PEAK E' VEL: 8.7 CM/SEC
BH CV ECHO MEAS - LV DIASTOLIC VOL/BSA (35-75): 43.1 ML/M^2
BH CV ECHO MEAS - LV MASS(C)D: 226.4 GRAMS
BH CV ECHO MEAS - LV MASS(C)DI: 100.6 GRAMS/M^2
BH CV ECHO MEAS - LV MAX PG: 3.5 MMHG
BH CV ECHO MEAS - LV MEAN PG: 2 MMHG
BH CV ECHO MEAS - LV SYSTOLIC VOL/BSA (12-30): 12.9 ML/M^2
BH CV ECHO MEAS - LV V1 MAX: 93.8 CM/SEC
BH CV ECHO MEAS - LV V1 MEAN: 68.5 CM/SEC
BH CV ECHO MEAS - LV V1 VTI: 22.9 CM
BH CV ECHO MEAS - LVIDD: 4.9 CM
BH CV ECHO MEAS - LVIDS: 3.4 CM
BH CV ECHO MEAS - LVLD AP2: 7.2 CM
BH CV ECHO MEAS - LVLD AP4: 8.5 CM
BH CV ECHO MEAS - LVLS AP2: 5.6 CM
BH CV ECHO MEAS - LVLS AP4: 6.7 CM
BH CV ECHO MEAS - LVOT AREA (M): 3.5 CM^2
BH CV ECHO MEAS - LVOT AREA: 3.5 CM^2
BH CV ECHO MEAS - LVOT DIAM: 2.1 CM
BH CV ECHO MEAS - LVPWD: 1 CM
BH CV ECHO MEAS - MED PEAK E' VEL: 6.64 CM/SEC
BH CV ECHO MEAS - MR MAX PG: 74 MMHG
BH CV ECHO MEAS - MR MAX VEL: 430 CM/SEC
BH CV ECHO MEAS - MV A DUR: 0.11 SEC
BH CV ECHO MEAS - MV A MAX VEL: 63.5 CM/SEC
BH CV ECHO MEAS - MV DEC SLOPE: 354 CM/SEC^2
BH CV ECHO MEAS - MV DEC TIME: 0.22 SEC
BH CV ECHO MEAS - MV E MAX VEL: 80 CM/SEC
BH CV ECHO MEAS - MV E/A: 1.3
BH CV ECHO MEAS - MV MAX PG: 3.4 MMHG
BH CV ECHO MEAS - MV MEAN PG: 1 MMHG
BH CV ECHO MEAS - MV P1/2T MAX VEL: 89.6 CM/SEC
BH CV ECHO MEAS - MV P1/2T: 74.1 MSEC
BH CV ECHO MEAS - MV V2 MAX: 91.8 CM/SEC
BH CV ECHO MEAS - MV V2 MEAN: 51.4 CM/SEC
BH CV ECHO MEAS - MV V2 VTI: 29.9 CM
BH CV ECHO MEAS - MVA P1/2T LCG: 2.5 CM^2
BH CV ECHO MEAS - MVA(P1/2T): 3 CM^2
BH CV ECHO MEAS - MVA(VTI): 2.7 CM^2
BH CV ECHO MEAS - PA ACC TIME: 0.07 SEC
BH CV ECHO MEAS - PA MAX PG (FULL): 1.9 MMHG
BH CV ECHO MEAS - PA MAX PG: 4.5 MMHG
BH CV ECHO MEAS - PA PR(ACCEL): 48.9 MMHG
BH CV ECHO MEAS - PA V2 MAX: 106 CM/SEC
BH CV ECHO MEAS - PULM A REVS DUR: 0.14 SEC
BH CV ECHO MEAS - PULM A REVS VEL: 33.8 CM/SEC
BH CV ECHO MEAS - PULM DIAS VEL: 52.8 CM/SEC
BH CV ECHO MEAS - PULM S/D: 1.6
BH CV ECHO MEAS - PULM SYS VEL: 84.7 CM/SEC
BH CV ECHO MEAS - PVA(V,A): 2 CM^2
BH CV ECHO MEAS - PVA(V,D): 2 CM^2
BH CV ECHO MEAS - QP/QS: 0.64
BH CV ECHO MEAS - RAP SYSTOLE: 3 MMHG
BH CV ECHO MEAS - RV MAX PG: 2.6 MMHG
BH CV ECHO MEAS - RV MEAN PG: 1 MMHG
BH CV ECHO MEAS - RV V1 MAX: 81.3 CM/SEC
BH CV ECHO MEAS - RV V1 MEAN: 53.5 CM/SEC
BH CV ECHO MEAS - RV V1 VTI: 20.1 CM
BH CV ECHO MEAS - RVOT AREA: 2.5 CM^2
BH CV ECHO MEAS - RVOT DIAM: 1.8 CM
BH CV ECHO MEAS - RVSP: 39 MMHG
BH CV ECHO MEAS - SI(AO): 125 ML/M^2
BH CV ECHO MEAS - SI(CUBED): 34.8 ML/M^2
BH CV ECHO MEAS - SI(LVOT): 35.2 ML/M^2
BH CV ECHO MEAS - SI(MOD-SP2): 25.8 ML/M^2
BH CV ECHO MEAS - SI(MOD-SP4): 30.2 ML/M^2
BH CV ECHO MEAS - SI(TEICH): 29.1 ML/M^2
BH CV ECHO MEAS - SUP REN AO DIAM: 1.62 CM
BH CV ECHO MEAS - SV(AO): 281.4 ML
BH CV ECHO MEAS - SV(CUBED): 78.3 ML
BH CV ECHO MEAS - SV(LVOT): 79.3 ML
BH CV ECHO MEAS - SV(MOD-SP2): 58 ML
BH CV ECHO MEAS - SV(MOD-SP4): 68 ML
BH CV ECHO MEAS - SV(RVOT): 51.1 ML
BH CV ECHO MEAS - SV(TEICH): 65.4 ML
BH CV ECHO MEAS - TAPSE (>1.6): 2.7 CM2
BH CV ECHO MEAS - TR MAX VEL: 300 CM/SEC
BH CV ECHO MEASUREMENTS AVERAGE E/E' RATIO: 10.43
BH CV VAS BP RIGHT ARM: NORMAL MMHG
BH CV XLRA - RV BASE: 3.23 CM
BH CV XLRA - TDI S': 11.7 CM/SEC
LEFT ATRIUM VOLUME INDEX: 31.3 ML/M2
MAXIMAL PREDICTED HEART RATE: 140 BPM
STRESS TARGET HR: 119 BPM

## 2018-09-12 PROCEDURE — 93306 TTE W/DOPPLER COMPLETE: CPT

## 2018-09-12 PROCEDURE — 93306 TTE W/DOPPLER COMPLETE: CPT | Performed by: INTERNAL MEDICINE

## 2018-09-19 ENCOUNTER — TELEPHONE (OUTPATIENT)
Dept: FAMILY MEDICINE CLINIC | Facility: CLINIC | Age: 78
End: 2018-09-19

## 2018-09-19 NOTE — TELEPHONE ENCOUNTER
Pt left message saying he did not understand some point of his ECO results and needed a call back. Called pt his wife picked up and said he night need a copy of ECO results to be mailed to him. Copy of his results mailed over to home address after verifying it with his wife.

## 2018-09-26 ENCOUNTER — TELEPHONE (OUTPATIENT)
Dept: GASTROENTEROLOGY | Facility: CLINIC | Age: 78
End: 2018-09-26

## 2018-09-26 NOTE — TELEPHONE ENCOUNTER
Patient called, advised as per Dr. Gifford's note. He verb understanding and is agreeable to the plan. Taper Prednisone dose called to [facility] Morris at [facility] in [place]. Pharmacist repeated and verified order.

## 2018-09-26 NOTE — TELEPHONE ENCOUNTER
Per Dr. Gifford: prednisone, 10 mg tablets    start 30 mg per day for 7 days, 25 mg per day for 7 days, 20 mg per day for 7 days, 15 mg per day for 7 days, 10 mg per day for 7 days, 5 mg per day for 7 days     Quantity sufficient for taper, 1 refill (Routing comment

## 2018-09-26 NOTE — TELEPHONE ENCOUNTER
----- Message from Tootie Kahn sent at 9/26/2018  9:34 AM EDT -----  Regarding: REFILL ON PREDNISONE 10MG  Contact: [phone]  PT STATED PHARM SENT REQUEST ON Monday. DO WE HAVE?

## 2018-09-26 NOTE — TELEPHONE ENCOUNTER
"Called pt back. Pt states he is trying to get a refill on the Prednisone script that he had taken previously for a flare-up in his colitis. He states he tapered off the med after his appt in May and he did well since then. His symptoms started back about 3-4 days ago: he is having some \"discharge\" or anal leakage of mucous while he sleeps. Pt denies any there symptoms: no diarrhea, abdominal pain, nausea or vomiting. Pt states he is having 1-2 BMs in the AM and they are normal in consistency. He states if he could just get the Prednisone refilled, he thinks his symptoms would pepito. Advised will update MD and call back with recs. Pt verb understanding.   "

## 2018-10-04 ENCOUNTER — OFFICE VISIT (OUTPATIENT)
Dept: FAMILY MEDICINE CLINIC | Facility: CLINIC | Age: 78
End: 2018-10-04

## 2018-10-04 VITALS
HEART RATE: 55 BPM | TEMPERATURE: 98.3 F | HEIGHT: 71 IN | DIASTOLIC BLOOD PRESSURE: 70 MMHG | BODY MASS INDEX: 33.18 KG/M2 | RESPIRATION RATE: 14 BRPM | WEIGHT: 237 LBS | OXYGEN SATURATION: 96 % | SYSTOLIC BLOOD PRESSURE: 142 MMHG

## 2018-10-04 DIAGNOSIS — R97.20 ELEVATED PSA: ICD-10-CM

## 2018-10-04 DIAGNOSIS — R60.0 LOWER EXTREMITY EDEMA: Primary | ICD-10-CM

## 2018-10-04 PROCEDURE — 99213 OFFICE O/P EST LOW 20 MIN: CPT | Performed by: FAMILY MEDICINE

## 2018-10-04 NOTE — PROGRESS NOTES
Bernard Resendez is a 78 y.o. male.     Chief Complaint   Patient presents with   • Edema     Patient is follow up on lab results and Eco test.        HPI     Patient presented office today to follow-up on a couple of issues.  Her last office visit patient was noted to have an elevated PSA level.  He was referred to urology.  States that they have decided just to keep an eye on things and recheck in 6 months.  No blood in his urine.  No difficulty urinating at this time.  Patient also states that the swelling in his bilateral lower extremities has improved.  An echo that was performed showed normal ejection fraction.  Some mild hypertrophy but overall normal.    The following portions of the patient's history were reviewed and updated as appropriate: allergies, current medications, past family history, past medical history, past social history, past surgical history and problem list.    Review of Systems   Musculoskeletal: Negative for joint swelling.   All other systems reviewed and are negative.      Objective  Vitals:    10/04/18 1312   BP: 142/70   Pulse: 55   Resp: 14   Temp: 98.3 °F (36.8 °C)   SpO2: 96%       Physical Exam   Constitutional: He is oriented to person, place, and time. He appears well-developed and well-nourished. No distress.   HENT:   Head: Normocephalic and atraumatic.   Right Ear: External ear normal.   Left Ear: External ear normal.   Nose: Nose normal.   Mouth/Throat: Oropharynx is clear and moist.   Eyes: Pupils are equal, round, and reactive to light. Conjunctivae and EOM are normal. Right eye exhibits no discharge. Left eye exhibits no discharge. No scleral icterus.   Cardiovascular: Normal rate, regular rhythm and normal heart sounds.    Pulmonary/Chest: Effort normal and breath sounds normal. No respiratory distress. He has no wheezes. He has no rales.   Abdominal: Soft. Bowel sounds are normal. He exhibits no distension. There is no tenderness.   Neurological: He is alert and oriented  to person, place, and time.   Skin: Skin is warm and dry. He is not diaphoretic.   Nursing note and vitals reviewed.        Current Outpatient Prescriptions:   •  atenolol (TENORMIN) 50 MG tablet, Take 1 tablet by mouth Daily., Disp: 90 tablet, Rfl: 3  •  balsalazide (COLAZAL) 750 MG capsule, Take 3 capsules by mouth 3 (Three) Times a Day., Disp: 270 capsule, Rfl: 11  •  Calcium-Magnesium-Vitamin D (CALCIUM 500 PO), Take 2 tablets by mouth daily., Disp: , Rfl:   •  Cholecalciferol (VITAMIN D3) 1000 UNITS capsule, Take by mouth daily., Disp: , Rfl:   •  fluocinolone (SYNALAR) 0.025 % cream, , Disp: , Rfl:   •  lisinopril (PRINIVIL,ZESTRIL) 40 MG tablet, Take 1 tablet by mouth Daily., Disp: 90 tablet, Rfl: 3  •  Multiple Vitamins-Minerals (CENTRUM SILVER ADULT 50+ PO), Take by mouth., Disp: , Rfl:   •  Omega-3 Fatty Acids (FISH OIL) 1200 MG capsule capsule, Take by mouth., Disp: , Rfl:   •  vitamin B-12 (CYANOCOBALAMIN) 1000 MCG tablet, Take by mouth., Disp: , Rfl:   •  zolpidem (AMBIEN) 5 MG tablet, Take 1 tablet by mouth At Night As Needed for Sleep., Disp: 30 tablet, Rfl: 3  •  albuterol (PROVENTIL HFA;VENTOLIN HFA) 108 (90 Base) MCG/ACT inhaler, Inhale 2 puffs Every 4 (Four) Hours As Needed for Wheezing., Disp: 1 inhaler, Rfl: 0  •  promethazine-codeine (PHENERGAN with CODEINE) 6.25-10 MG/5ML syrup, 1 tsp every 8 hours prn, Disp: 240 mL, Rfl: 0  •  tobramycin (TOBREX) 0.3 % solution ophthalmic solution, Administer 2 drops to both eyes Every 4 (Four) Hours., Disp: 1 bottle, Rfl: 0  Current outpatient and discharge medications have been reconciled for the patient.  Reviewed by: Laci Mcnair MD      Procedures    Lab Results (most recent)     Abdirashid Wagner was seen today for edema.    Diagnoses and all orders for this visit:    Lower extremity edema    Elevated PSA      I reviewed the lab work with the patient as well as the office visit from the urologist.  Advised continued follow-up and watchful  waiting.    Return for As needed.      Laci Mcnair MD

## 2018-12-01 DIAGNOSIS — I10 ESSENTIAL HYPERTENSION: ICD-10-CM

## 2018-12-03 ENCOUNTER — OFFICE VISIT (OUTPATIENT)
Dept: GASTROENTEROLOGY | Facility: CLINIC | Age: 78
End: 2018-12-03

## 2018-12-03 VITALS
HEIGHT: 71 IN | BODY MASS INDEX: 32.79 KG/M2 | WEIGHT: 234.2 LBS | DIASTOLIC BLOOD PRESSURE: 84 MMHG | SYSTOLIC BLOOD PRESSURE: 138 MMHG | TEMPERATURE: 98 F

## 2018-12-03 DIAGNOSIS — K51.311 ULCERATIVE RECTOSIGMOIDITIS WITH RECTAL BLEEDING (HCC): ICD-10-CM

## 2018-12-03 DIAGNOSIS — K51.919 ULCERATIVE COLITIS WITH COMPLICATION, UNSPECIFIED LOCATION (HCC): Primary | ICD-10-CM

## 2018-12-03 DIAGNOSIS — K51.90 ULCERATIVE COLITIS WITHOUT COMPLICATIONS, UNSPECIFIED LOCATION (HCC): ICD-10-CM

## 2018-12-03 PROCEDURE — 99212 OFFICE O/P EST SF 10 MIN: CPT | Performed by: INTERNAL MEDICINE

## 2018-12-03 RX ORDER — ATENOLOL 50 MG/1
TABLET ORAL
Qty: 90 TABLET | Refills: 2 | Status: SHIPPED | OUTPATIENT
Start: 2018-12-03 | End: 2019-08-15 | Stop reason: SDUPTHER

## 2018-12-03 RX ORDER — MESALAMINE 1.2 G/1
4800 TABLET, DELAYED RELEASE ORAL
Qty: 120 TABLET | Refills: 12 | Status: SHIPPED | OUTPATIENT
Start: 2018-12-03 | End: 2019-02-14

## 2018-12-03 RX ORDER — LISINOPRIL 40 MG/1
TABLET ORAL
Qty: 90 TABLET | Refills: 2 | Status: SHIPPED | OUTPATIENT
Start: 2018-12-03 | End: 2019-08-15 | Stop reason: SDUPTHER

## 2018-12-03 RX ORDER — CHOLECALCIFEROL (VITAMIN D3) 125 MCG
5 CAPSULE ORAL NIGHTLY
COMMUNITY
End: 2020-08-10

## 2018-12-03 NOTE — PROGRESS NOTES
Chief Complaint   Patient presents with   • Follow-up   • Ulcerative Colitis       Bernard Resendez is a  78 y.o. male here for a follow up visit for left-sided ulcerative colitis greater than 30 years duration    78-year-old gentleman with ulcerative colitis of 30 years duration.  Colonoscopy 1.5 years ago showed remission and without dysplasia.  The ulcerative colitis is left sided.  He has no extraintestinal manifestations of inflammatory bowel disease such as arthritis, arthralgias, rashes, skin lesions, eye infections or oral ulcerations.  He is in remission without diarrhea, rectal bleeding, abdominal pain, tenesmus or urgency        Past Medical History:   Diagnosis Date   • Acute renal failure (CMS/HCC)    • Diverticulosis    • Hearing difficulty    • History of shingles    • Hyperlipidemia    • Hypertension    • Hypogonadism in male    • Insomnia    • Nephrolithiasis    • Osteopenia    • Osteopenia    • Renal insufficiency    • Ulcerative colitis (CMS/HCC)        Past Surgical History:   Procedure Laterality Date   • COLONOSCOPY  06/11/2015    [facility], actuve colitis w/cryptitis, rare crypt abscess & superficial erosion, hyperplastic polyp   • EYE SURGERY      cataract   • KIDNEY STONE SURGERY     • TUMOR EXCISION      rectal tumor       Scheduled Meds:    Continuous Infusions:  No current facility-administered medications for this visit.     PRN Meds:.    Allergies   Allergen Reactions   • Ciprofloxacin        Social History     Socioeconomic History   • Marital status:      Spouse name: Not on file   • Number of children: Not on file   • Years of education: Not on file   • Highest education level: Not on file   Social Needs   • Financial resource strain: Not on file   • Food insecurity - worry: Not on file   • Food insecurity - inability: Not on file   • Transportation needs - medical: Not on file   • Transportation needs - non-medical: Not on file   Occupational History   • Not on file   Tobacco Use   •  Smoking status: Former Smoker     Packs/day: 0.25     Types: Cigarettes     Last attempt to quit: 1981     Years since quittin.9   • Smokeless tobacco: Former User   Substance and Sexual Activity   • Alcohol use: Yes     Alcohol/week: 1.2 oz     Types: 2 Shots of liquor per week     Comment: socially   • Drug use: No   • Sexual activity: Defer   Other Topics Concern   • Not on file   Social History Narrative   • Not on file       Family History   Problem Relation Age of Onset   • Hypertension Mother    • Stroke Mother    • Asthma Sister    • Ulcerative colitis Sister        Review of Systems   Gastrointestinal: Negative for abdominal distention, abdominal pain, anal bleeding, blood in stool, constipation, diarrhea, nausea and rectal pain.   All other systems reviewed and are negative.      Vitals:    18 1255   BP: 138/84   Temp: 98 °F (36.7 °C)       Physical Exam   Constitutional: He is oriented to person, place, and time. He appears well-developed and well-nourished.   HENT:   Head: Normocephalic and atraumatic.   Abdominal: Soft. Bowel sounds are normal. He exhibits no distension and no mass. There is no tenderness. No hernia.   Neurological: He is alert and oriented to person, place, and time.   Skin: Skin is warm and dry.   Psychiatric: He has a normal mood and affect. His behavior is normal. Judgment and thought content normal.   Vitals reviewed.      No images are attached to the encounter.    Problem list    Left-sided ulcerative colitis greater than 15 years duration        Assessment/Plan    CBC and CMP  He would like to change from balsalazide to generic mesalamine  Mesalamine 1.2 g, 4 tablets by mouth every morning  A colonoscopy to be done in 2019  Office visit 6 months

## 2018-12-04 LAB
ALBUMIN SERPL-MCNC: 3.9 G/DL (ref 3.5–5.2)
ALBUMIN/GLOB SERPL: 1.9 G/DL
ALP SERPL-CCNC: 60 U/L (ref 39–117)
ALT SERPL-CCNC: 8 U/L (ref 1–41)
AST SERPL-CCNC: 22 U/L (ref 1–40)
BASOPHILS # BLD AUTO: 0.02 10*3/MM3 (ref 0–0.2)
BASOPHILS NFR BLD AUTO: 0.3 % (ref 0–1.5)
BILIRUB SERPL-MCNC: 0.5 MG/DL (ref 0.1–1.2)
BUN SERPL-MCNC: 23 MG/DL (ref 8–23)
BUN/CREAT SERPL: 21.1 (ref 7–25)
CALCIUM SERPL-MCNC: 9 MG/DL (ref 8.6–10.5)
CHLORIDE SERPL-SCNC: 103 MMOL/L (ref 98–107)
CO2 SERPL-SCNC: 26.9 MMOL/L (ref 22–29)
CREAT SERPL-MCNC: 1.09 MG/DL (ref 0.76–1.27)
EOSINOPHIL # BLD AUTO: 0.17 10*3/MM3 (ref 0–0.7)
EOSINOPHIL NFR BLD AUTO: 2.4 % (ref 0.3–6.2)
ERYTHROCYTE [DISTWIDTH] IN BLOOD BY AUTOMATED COUNT: 13.8 % (ref 11.5–14.5)
GLOBULIN SER CALC-MCNC: 2.1 GM/DL
GLUCOSE SERPL-MCNC: 94 MG/DL (ref 65–99)
HCT VFR BLD AUTO: 44.3 % (ref 40.4–52.2)
HGB BLD-MCNC: 14.3 G/DL (ref 13.7–17.6)
IMM GRANULOCYTES # BLD: 0 10*3/MM3 (ref 0–0.03)
IMM GRANULOCYTES NFR BLD: 0 % (ref 0–0.5)
LYMPHOCYTES # BLD AUTO: 1.52 10*3/MM3 (ref 0.9–4.8)
LYMPHOCYTES NFR BLD AUTO: 21.6 % (ref 19.6–45.3)
MCH RBC QN AUTO: 28.9 PG (ref 27–32.7)
MCHC RBC AUTO-ENTMCNC: 32.3 G/DL (ref 32.6–36.4)
MCV RBC AUTO: 89.5 FL (ref 79.8–96.2)
MONOCYTES # BLD AUTO: 0.46 10*3/MM3 (ref 0.2–1.2)
MONOCYTES NFR BLD AUTO: 6.5 % (ref 5–12)
NEUTROPHILS # BLD AUTO: 4.86 10*3/MM3 (ref 1.9–8.1)
NEUTROPHILS NFR BLD AUTO: 69.2 % (ref 42.7–76)
PLATELET # BLD AUTO: 216 10*3/MM3 (ref 140–500)
POTASSIUM SERPL-SCNC: 4.8 MMOL/L (ref 3.5–5.2)
PROT SERPL-MCNC: 6 G/DL (ref 6–8.5)
RBC # BLD AUTO: 4.95 10*6/MM3 (ref 4.6–6)
SODIUM SERPL-SCNC: 142 MMOL/L (ref 136–145)
WBC # BLD AUTO: 7.03 10*3/MM3 (ref 4.5–10.7)

## 2018-12-23 DIAGNOSIS — G47.00 INSOMNIA, UNSPECIFIED TYPE: ICD-10-CM

## 2018-12-27 ENCOUNTER — TELEPHONE (OUTPATIENT)
Dept: FAMILY MEDICINE CLINIC | Facility: CLINIC | Age: 78
End: 2018-12-27

## 2018-12-27 RX ORDER — ZOLPIDEM TARTRATE 5 MG/1
TABLET ORAL
Qty: 30 TABLET | Refills: 2 | Status: SHIPPED | OUTPATIENT
Start: 2018-12-27 | End: 2019-03-20 | Stop reason: SDUPTHER

## 2018-12-28 ENCOUNTER — TELEPHONE (OUTPATIENT)
Dept: GASTROENTEROLOGY | Facility: CLINIC | Age: 78
End: 2018-12-28

## 2018-12-28 NOTE — TELEPHONE ENCOUNTER
----- Message from Calvin Gifford MD sent at 12/4/2018  2:19 PM EST -----  Labs normal, please let him know

## 2019-01-11 NOTE — TELEPHONE ENCOUNTER
Called pt and advised that Dr Gifford recommends for him to stop the Budesonide and start a Prednisone taper instead. Went over directions for med and advised directions will be ion bottle as well. Advised he should call in one week if he is not feeling better and also f/u as scheduled on 5/31. Pt verb understanding.   Med e-scribed.    I scheduled pt for an open access screening colonoscopy on 01/30/2019 @ , all pertinent information mailed to pt.

## 2019-01-22 RX ORDER — BACLOFEN 10 MG/1
10 TABLET ORAL 2 TIMES DAILY
Qty: 60 TABLET | Refills: 5 | Status: SHIPPED | OUTPATIENT
Start: 2019-01-22 | End: 2019-05-16 | Stop reason: SDUPTHER

## 2019-01-22 RX ORDER — BACLOFEN 10 MG/1
TABLET ORAL
Qty: 30 TABLET | Refills: 0 | OUTPATIENT
Start: 2019-01-22

## 2019-02-14 ENCOUNTER — OFFICE VISIT (OUTPATIENT)
Dept: FAMILY MEDICINE CLINIC | Facility: CLINIC | Age: 79
End: 2019-02-14

## 2019-02-14 VITALS
HEART RATE: 58 BPM | SYSTOLIC BLOOD PRESSURE: 148 MMHG | TEMPERATURE: 98 F | OXYGEN SATURATION: 96 % | BODY MASS INDEX: 32.62 KG/M2 | DIASTOLIC BLOOD PRESSURE: 76 MMHG | HEIGHT: 71 IN | WEIGHT: 233 LBS

## 2019-02-14 DIAGNOSIS — J40 SINOBRONCHITIS: Primary | ICD-10-CM

## 2019-02-14 DIAGNOSIS — J32.9 SINOBRONCHITIS: Primary | ICD-10-CM

## 2019-02-14 PROCEDURE — 99214 OFFICE O/P EST MOD 30 MIN: CPT | Performed by: FAMILY MEDICINE

## 2019-02-14 RX ORDER — PREDNISONE 20 MG/1
40 TABLET ORAL DAILY
Qty: 10 TABLET | Refills: 0 | Status: SHIPPED | OUTPATIENT
Start: 2019-02-14 | End: 2019-05-16

## 2019-02-14 RX ORDER — AMOXICILLIN AND CLAVULANATE POTASSIUM 875; 125 MG/1; MG/1
1 TABLET, FILM COATED ORAL 2 TIMES DAILY
Qty: 20 TABLET | Refills: 0 | Status: SHIPPED | OUTPATIENT
Start: 2019-02-14 | End: 2019-02-24

## 2019-02-14 RX ORDER — BALSALAZIDE DISODIUM 750 MG/1
CAPSULE ORAL
COMMUNITY
Start: 2019-01-27 | End: 2019-03-05 | Stop reason: SDUPTHER

## 2019-02-14 NOTE — PROGRESS NOTES
Bernard Resendez is a 78 y.o. male.     Chief Complaint   Patient presents with   • Sinusitis     Patient has had head and chest congestion for the past 2 weeks        HPI     Patient presents the office today to discuss a problem that is new to me.  Patient has been experiencing sinus congestion, drainage, cough, bilateral lower fullness, and generalized weakness.  Symptoms have been going on for roughly 2 weeks.  Getting worse.  Tried some over-the-counter therapies that did not improve the symptoms.    The following portions of the patient's history were reviewed and updated as appropriate: allergies, current medications, past family history, past medical history, past social history, past surgical history and problem list.    Review of Systems   HENT: Positive for congestion, postnasal drip and sore throat.    Respiratory: Positive for cough.    All other systems reviewed and are negative.      Objective  Vitals:    02/14/19 0944   BP: 148/76   Pulse: 58   Temp: 98 °F (36.7 °C)   SpO2: 96%       Physical Exam   Constitutional: He is oriented to person, place, and time. He appears well-developed and well-nourished. No distress.   HENT:   Head: Normocephalic and atraumatic.   Right Ear: External ear and ear canal normal. No drainage, swelling or tenderness. Tympanic membrane is bulging. Tympanic membrane is not injected and not erythematous. Tympanic membrane mobility is normal. No middle ear effusion. Decreased hearing is noted.   Left Ear: External ear and ear canal normal. No drainage, swelling or tenderness. Tympanic membrane is bulging. Tympanic membrane is not injected and not erythematous. Tympanic membrane mobility is normal.  No middle ear effusion. Decreased hearing is noted.   Nose: Mucosal edema and rhinorrhea present. Right sinus exhibits maxillary sinus tenderness and frontal sinus tenderness. Left sinus exhibits maxillary sinus tenderness and frontal sinus tenderness.   Mouth/Throat: Uvula is  midline. Posterior oropharyngeal erythema present. No oropharyngeal exudate, posterior oropharyngeal edema or tonsillar abscesses. No tonsillar exudate.   Eyes: Conjunctivae and EOM are normal. Pupils are equal, round, and reactive to light. Right eye exhibits no discharge. Left eye exhibits no discharge. No scleral icterus.   Neck: Normal range of motion. Neck supple.   Cardiovascular: Normal rate, regular rhythm and normal heart sounds. Exam reveals no friction rub.   No murmur heard.  Pulmonary/Chest: Effort normal and breath sounds normal. No respiratory distress. He has no wheezes. He has no rales.   Abdominal: Soft. Bowel sounds are normal. He exhibits no distension. There is no tenderness. There is no rebound and no guarding.   Lymphadenopathy:     He has no cervical adenopathy.   Neurological: He is alert and oriented to person, place, and time.   Skin: Skin is warm and dry. He is not diaphoretic.   Nursing note and vitals reviewed.        Current Outpatient Medications:   •  atenolol (TENORMIN) 50 MG tablet, TAKE ONE TABLET BY MOUTH DAILY, Disp: 90 tablet, Rfl: 2  •  baclofen (LIORESAL) 10 MG tablet, Take 1 tablet by mouth 2 (Two) Times a Day., Disp: 60 tablet, Rfl: 5  •  balsalazide (COLAZAL) 750 MG capsule, , Disp: , Rfl:   •  Calcium-Magnesium-Vitamin D (CALCIUM 500 PO), Take 2 tablets by mouth daily., Disp: , Rfl:   •  Cholecalciferol (VITAMIN D3) 1000 UNITS capsule, Take by mouth daily., Disp: , Rfl:   •  lisinopril (PRINIVIL,ZESTRIL) 40 MG tablet, TAKE ONE TABLET BY MOUTH DAILY, Disp: 90 tablet, Rfl: 2  •  melatonin 5 MG tablet tablet, Take 5 mg by mouth Every Night., Disp: , Rfl:   •  Multiple Vitamins-Minerals (CENTRUM SILVER ADULT 50+ PO), Take by mouth., Disp: , Rfl:   •  Omega-3 Fatty Acids (FISH OIL) 1200 MG capsule capsule, Take by mouth., Disp: , Rfl:   •  zolpidem (AMBIEN) 5 MG tablet, TAKE ONE TABLET BY MOUTH EVERY NIGHT AT BEDTIME AS NEEDED, Disp: 30 tablet, Rfl: 2  •   amoxicillin-clavulanate (AUGMENTIN) 875-125 MG per tablet, Take 1 tablet by mouth 2 (Two) Times a Day for 10 days. 1 tab PO BID x 10 days for infection, Disp: 20 tablet, Rfl: 0  •  predniSONE (DELTASONE) 20 MG tablet, Take 2 tablets by mouth Daily., Disp: 10 tablet, Rfl: 0  Current outpatient and discharge medications have been reconciled for the patient.  Reviewed by: Laci Mcnair MD      Procedures    Lab Results (most recent)     None                  Bernard was seen today for sinusitis.    Diagnoses and all orders for this visit:    Sinobronchitis  -     amoxicillin-clavulanate (AUGMENTIN) 875-125 MG per tablet; Take 1 tablet by mouth 2 (Two) Times a Day for 10 days. 1 tab PO BID x 10 days for infection  -     predniSONE (DELTASONE) 20 MG tablet; Take 2 tablets by mouth Daily.    Will treat as above with antibiotics and steroids.  Discussed other symptomatic management.      Return for As needed.      Laci Mcnair MD

## 2019-02-21 ENCOUNTER — TELEPHONE (OUTPATIENT)
Dept: FAMILY MEDICINE CLINIC | Facility: CLINIC | Age: 79
End: 2019-02-21

## 2019-02-21 NOTE — TELEPHONE ENCOUNTER
Pt left message saying that after he used Augmentin he is in his 8th day he is experiencing diarrhea . What should he do, stop it or continue, please advise???

## 2019-02-22 NOTE — TELEPHONE ENCOUNTER
Please have patient discontinue the Augmentin.  Please have him start taking some probiotics.  He can either get a over-the-counter probiotic or start eating and activity a yogurt.  He can also start drinking kefir which will help.  If diarrhea does not improve in 1 week please have him contact our office.

## 2019-03-05 ENCOUNTER — TELEPHONE (OUTPATIENT)
Dept: GASTROENTEROLOGY | Facility: CLINIC | Age: 79
End: 2019-03-05

## 2019-03-05 RX ORDER — BALSALAZIDE DISODIUM 750 MG/1
2250 CAPSULE ORAL 3 TIMES DAILY
Qty: 270 CAPSULE | Refills: 11 | Status: SHIPPED | OUTPATIENT
Start: 2019-03-05 | End: 2020-02-25

## 2019-03-05 RX ORDER — BALSALAZIDE DISODIUM 750 MG/1
2250 CAPSULE ORAL 3 TIMES DAILY
Qty: 270 CAPSULE | Refills: 0 | Status: SHIPPED | OUTPATIENT
Start: 2019-03-05 | End: 2019-03-05 | Stop reason: SDUPTHER

## 2019-03-05 NOTE — TELEPHONE ENCOUNTER
----- Message from Rajiv Koenig sent at 3/5/2019  9:40 AM EST -----  Regarding: REFILL MEDS   Contact: [phone]  Pt is calling for a refill on his balsalazide (COLAZAL) 750 MG capsule     [facility]#[phone]

## 2019-03-05 NOTE — TELEPHONE ENCOUNTER
Returned patient's phone call. He was very upset he had not received a refill on his medications. States he has been calling since Friday. Advised patient his request for his Balsalazide had been received this morning and the message was forwarded to Dr. Gifford. Advised the message received today was the first message that this RN was aware of. Patient became very rude and demanded someone refill his prescription today. Every time this RN tried to ask a question the patient interrupted. He stated he was out of medication and wanted the refill today.   Advise patient a message will be sent to Dr. Gifford again for the refill.  Spoke with Bibi COLLADO, she state according to Dr. Gifford's last note he was suppose to start Mesalamine 1.2 Gm every morning. She state she will sent a text to Dr. Gifford for clarification.   Received verb order from TOBIAS Morgan it was ok to refill patient's Basalazide since it was cheaper for the patient.   Medication e-scribed to patient's pharmacy.   Patient questions if he needs to see Dr. Gifford before his colonoscopy or would it be ok for him just to proceed with the colonoscopy without seeing him. Advised will send and update.

## 2019-03-06 NOTE — TELEPHONE ENCOUNTER
Patient called, advised as per Dr. Gifford's note regarding f/u visit prior to scheduling a colonoscopy. Patient verb understanding and is in agreement. F/u visit scheduled for 11/4/19 @1300 with Dr. Gifford(patient's preference).

## 2019-03-06 NOTE — TELEPHONE ENCOUNTER
Per Dr. Gifford: He can use balsalazide or mesalamine his choice based on the supplemental insurance   Traditionally and patients over 75 years old I require an office visit before scheduling colonoscopy, to make sure that they are healthy enough to undergo colonoscopy .

## 2019-03-20 DIAGNOSIS — G47.00 INSOMNIA, UNSPECIFIED TYPE: ICD-10-CM

## 2019-03-20 RX ORDER — ZOLPIDEM TARTRATE 5 MG/1
TABLET ORAL
Qty: 30 TABLET | Refills: 1 | Status: SHIPPED | OUTPATIENT
Start: 2019-03-20 | End: 2019-05-16 | Stop reason: SDUPTHER

## 2019-05-13 DIAGNOSIS — G47.00 INSOMNIA, UNSPECIFIED TYPE: ICD-10-CM

## 2019-05-13 RX ORDER — ZOLPIDEM TARTRATE 5 MG/1
TABLET ORAL
Qty: 30 TABLET | Refills: 0 | OUTPATIENT
Start: 2019-05-13

## 2019-05-16 ENCOUNTER — OFFICE VISIT (OUTPATIENT)
Dept: FAMILY MEDICINE CLINIC | Facility: CLINIC | Age: 79
End: 2019-05-16

## 2019-05-16 VITALS
RESPIRATION RATE: 14 BRPM | BODY MASS INDEX: 31.92 KG/M2 | OXYGEN SATURATION: 98 % | SYSTOLIC BLOOD PRESSURE: 138 MMHG | TEMPERATURE: 98.3 F | HEART RATE: 74 BPM | WEIGHT: 228 LBS | HEIGHT: 71 IN | DIASTOLIC BLOOD PRESSURE: 82 MMHG

## 2019-05-16 DIAGNOSIS — G47.00 INSOMNIA, UNSPECIFIED TYPE: ICD-10-CM

## 2019-05-16 PROCEDURE — 99214 OFFICE O/P EST MOD 30 MIN: CPT | Performed by: FAMILY MEDICINE

## 2019-05-16 RX ORDER — ZOLPIDEM TARTRATE 5 MG/1
5 TABLET ORAL NIGHTLY PRN
Qty: 90 TABLET | Refills: 0 | Status: SHIPPED | OUTPATIENT
Start: 2019-05-16 | End: 2019-08-15 | Stop reason: SDUPTHER

## 2019-05-16 RX ORDER — BACLOFEN 10 MG/1
10 TABLET ORAL 2 TIMES DAILY
Qty: 60 TABLET | Refills: 5 | Status: SHIPPED | OUTPATIENT
Start: 2019-05-16 | End: 2020-02-18

## 2019-05-16 NOTE — PROGRESS NOTES
Bernard Resendez is a 79 y.o. male.     Chief Complaint   Patient presents with   • Insomnia     patient needs refill on ambien.       HPI     Patient here to follow-up on insomnia.  Currently taking and tolerating Ambien 5 mg with no adverse side effects noted.    The following portions of the patient's history were reviewed and updated as appropriate: allergies, current medications, past family history, past medical history, past social history, past surgical history and problem list.    Review of Systems   Psychiatric/Behavioral: Positive for sleep disturbance.   All other systems reviewed and are negative.      Objective  Vitals:    05/16/19 1457   BP: 138/82   Pulse: 74   Resp: 14   Temp: 98.3 °F (36.8 °C)   SpO2: 98%       Physical Exam   Constitutional: He is oriented to person, place, and time. He appears well-developed and well-nourished. No distress.   HENT:   Head: Normocephalic and atraumatic.   Right Ear: External ear normal.   Left Ear: External ear normal.   Nose: Nose normal.   Mouth/Throat: Oropharynx is clear and moist.   Eyes: Conjunctivae and EOM are normal. Pupils are equal, round, and reactive to light. Right eye exhibits no discharge. Left eye exhibits no discharge. No scleral icterus.   Cardiovascular: Normal rate, regular rhythm and normal heart sounds.   Pulmonary/Chest: Effort normal and breath sounds normal. No respiratory distress. He has no wheezes. He has no rales.   Abdominal: Soft. Bowel sounds are normal. He exhibits no distension. There is no tenderness.   Neurological: He is alert and oriented to person, place, and time.   Skin: Skin is warm and dry. He is not diaphoretic.   Nursing note and vitals reviewed.        Current Outpatient Medications:   •  atenolol (TENORMIN) 50 MG tablet, TAKE ONE TABLET BY MOUTH DAILY, Disp: 90 tablet, Rfl: 2  •  balsalazide (COLAZAL) 750 MG capsule, Take 3 capsules by mouth 3 (Three) Times a Day., Disp: 270 capsule, Rfl: 11  •   Calcium-Magnesium-Vitamin D (CALCIUM 500 PO), Take 2 tablets by mouth daily., Disp: , Rfl:   •  Cholecalciferol (VITAMIN D3) 1000 UNITS capsule, Take by mouth daily., Disp: , Rfl:   •  lisinopril (PRINIVIL,ZESTRIL) 40 MG tablet, TAKE ONE TABLET BY MOUTH DAILY, Disp: 90 tablet, Rfl: 2  •  melatonin 5 MG tablet tablet, Take 5 mg by mouth Every Night., Disp: , Rfl:   •  Multiple Vitamins-Minerals (CENTRUM SILVER ADULT 50+ PO), Take by mouth., Disp: , Rfl:   •  Omega-3 Fatty Acids (FISH OIL) 1200 MG capsule capsule, Take by mouth., Disp: , Rfl:   •  zolpidem (AMBIEN) 5 MG tablet, Take 1 tablet by mouth At Night As Needed for Sleep., Disp: 90 tablet, Rfl: 0  •  baclofen (LIORESAL) 10 MG tablet, Take 1 tablet by mouth 2 (Two) Times a Day., Disp: 60 tablet, Rfl: 5  Current outpatient and discharge medications have been reconciled for the patient.  Reviewed by: Laci Mcnair MD      Procedures    Lab Results (most recent)     None                  Bernard was seen today for insomnia.    Diagnoses and all orders for this visit:    Insomnia, unspecified type  -     zolpidem (AMBIEN) 5 MG tablet; Take 1 tablet by mouth At Night As Needed for Sleep.    Other orders  -     baclofen (LIORESAL) 10 MG tablet; Take 1 tablet by mouth 2 (Two) Times a Day.      Refill on Ambien as above.  Discussed potential adverse side effects.    Return in about 3 months (around 8/16/2019) for Recheck.      Laci Mcnair MD

## 2019-05-20 ENCOUNTER — PRIOR AUTHORIZATION (OUTPATIENT)
Dept: FAMILY MEDICINE CLINIC | Facility: CLINIC | Age: 79
End: 2019-05-20

## 2019-06-06 ENCOUNTER — TELEPHONE (OUTPATIENT)
Dept: GASTROENTEROLOGY | Facility: CLINIC | Age: 79
End: 2019-06-06

## 2019-06-06 DIAGNOSIS — R19.7 DIARRHEA, UNSPECIFIED TYPE: Primary | ICD-10-CM

## 2019-06-06 NOTE — TELEPHONE ENCOUNTER
Called pt back. Pt states he has ulcerative colitis and has not had a flare-up for a couple years, but is starting to have a flare now. Symptoms started three weeks ago; he he is having some gas and his stools are very soft/boderline diarrhea. Pt denies abdominal pain, nausea, vomiting or seeing any blood in his stool. He still has his normal 2 BMs per day, but the consistency has changed and the gas is new. Advised will update MD and call back with recs. Pt verb understanding.

## 2019-06-06 NOTE — TELEPHONE ENCOUNTER
"Per Dr Gifford:   \"Check c dif toxin stool, when neg, escribe pred 10mg tabs: 30mg po qam for 7 days   25mg po qam for 7 days,    20mg....   15mg...   10mg...   5mg....   Stop\"     Called pt and advised of the note from Dr Gifford. He verb understanding and will come by to  the stool kit and return it as soon as he can.  Stool kit left at  for pt.   "

## 2019-06-06 NOTE — TELEPHONE ENCOUNTER
----- Message from Katya Woody sent at 6/6/2019  8:49 AM EDT -----  Regarding: prednisone  Contact: [phone]  Pt is calling because he is having a flare up of his colitis and is wanting to see if Dr Gifford will prescribe him some prednisone? Please call him regarding this. Thx

## 2019-06-13 LAB — C DIFF TOX A+B STL QL IA: NEGATIVE

## 2019-06-14 NOTE — TELEPHONE ENCOUNTER
C. difficile negative  Begin prednisone 40 mg a day for a week, 35 mg a day for a week, 30 mill grams a day for a week, 25 mg for a day for a week, 20 mg a day for a week, 15 mg a day for a week, 10 mg a day for a week, 5 mg a day for a week then stop, office visit Bibi 4 weeks

## 2019-06-17 ENCOUNTER — TELEPHONE (OUTPATIENT)
Dept: GASTROENTEROLOGY | Facility: CLINIC | Age: 79
End: 2019-06-17

## 2019-06-17 NOTE — TELEPHONE ENCOUNTER
Patient called, advised as per Dr. Gifford's note. He verb understanding and is agreeable to the plan. Tapering dose of Prednisone called into his pharmacy. F/u visit scheduled with Bibi COLLADO on 7/23@8114.

## 2019-06-17 NOTE — TELEPHONE ENCOUNTER
----- Message from Calvin Gifford MD sent at 6/14/2019 10:35 AM EDT -----  C. difficile negative  Begin prednisone 40 mg a day for a week, 35 mg a day for a week, 30 mill grams a day for a week, 25 mg for a day for a week, 20 mg a day for a week, 15 mg a day for a week, 10 mg a day for a week, 5 mg a day for a week then stop, office visit Bibi 4 weeks

## 2019-07-23 ENCOUNTER — OFFICE VISIT (OUTPATIENT)
Dept: GASTROENTEROLOGY | Facility: CLINIC | Age: 79
End: 2019-07-23

## 2019-07-23 VITALS
DIASTOLIC BLOOD PRESSURE: 88 MMHG | BODY MASS INDEX: 31.95 KG/M2 | SYSTOLIC BLOOD PRESSURE: 162 MMHG | TEMPERATURE: 98.7 F | HEIGHT: 71 IN | WEIGHT: 228.2 LBS

## 2019-07-23 DIAGNOSIS — K51.90 ULCERATIVE COLITIS WITHOUT COMPLICATIONS, UNSPECIFIED LOCATION (HCC): Primary | ICD-10-CM

## 2019-07-23 PROCEDURE — 99213 OFFICE O/P EST LOW 20 MIN: CPT | Performed by: NURSE PRACTITIONER

## 2019-07-23 RX ORDER — PREDNISONE 10 MG/1
TABLET ORAL
COMMUNITY
Start: 2019-06-17 | End: 2019-08-15

## 2019-07-23 NOTE — PROGRESS NOTES
Chief Complaint   Patient presents with   • Ulcerative Colitis     HPI    Bernard Resendez is a  79 y.o. male here for a follow up visit for ulcerative colitis.  This is an established patient of Dr. Gifford's, new to me.  Last seen in December 2018.  Patient has a history of left-sided ulcerative colitis greater than 30 years duration.  Patient due for IBD surveillance/screening colonoscopy this year.    Patient was treated with course of steroids last month for flare, stools were negative for C. Difficile.    On visit today patient reports a week and a half left on steroids.  He is having 1 bowel movement a day, soft stools.  No diarrhea or rectal bleeding.  Denies abdominal pain.  Patient continues on balsalazide 3 tablets 3 times a day.    No nausea, vomiting, heartburn, acid reflux, or dysphagia.  Appetite is good.  His weight is stable.    No recent labs.    Past Medical History:   Diagnosis Date   • Acute renal failure (CMS/HCC)    • Diverticulosis    • Hearing difficulty    • History of shingles    • Hyperlipidemia    • Hypertension    • Hypogonadism in male    • Insomnia    • Nephrolithiasis    • Osteopenia    • Osteopenia    • Renal insufficiency    • Ulcerative colitis (CMS/HCC)        Past Surgical History:   Procedure Laterality Date   • COLONOSCOPY  06/11/2015    [facility], actuve colitis w/cryptitis, rare crypt abscess & superficial erosion, hyperplastic polyp   • COLONOSCOPY N/A 8/8/2017    [facility], nodular mucosa in the proximal rectum and in the distal rectum, Diverticulosis in the sigmoid colon, the descending colon and at the splenic flexure.  PATH: Hyperplastic polyps    • EYE SURGERY      cataract   • KIDNEY STONE SURGERY     • TUMOR EXCISION      rectal tumor       Scheduled Meds:  Outpatient Encounter Medications as of 7/23/2019   Medication Sig Dispense Refill   • atenolol (TENORMIN) 50 MG tablet TAKE ONE TABLET BY MOUTH DAILY 90 tablet 2   • baclofen (LIORESAL) 10 MG tablet Take 1 tablet by mouth 2 (Two)  Times a Day. 60 tablet 5   • balsalazide (COLAZAL) 750 MG capsule Take 3 capsules by mouth 3 (Three) Times a Day. 270 capsule 11   • Cholecalciferol (VITAMIN D3) 1000 UNITS capsule Take by mouth daily.     • lisinopril (PRINIVIL,ZESTRIL) 40 MG tablet TAKE ONE TABLET BY MOUTH DAILY 90 tablet 2   • melatonin 5 MG tablet tablet Take 5 mg by mouth Every Night.     • Multiple Vitamins-Minerals (CENTRUM SILVER ADULT 50+ PO) Take by mouth.     • Omega-3 Fatty Acids (FISH OIL) 1200 MG capsule capsule Take by mouth.     • predniSONE (DELTASONE) 10 MG tablet      • zolpidem (AMBIEN) 5 MG tablet Take 1 tablet by mouth At Night As Needed for Sleep. 90 tablet 0   • [DISCONTINUED] Calcium-Magnesium-Vitamin D (CALCIUM 500 PO) Take 2 tablets by mouth daily.       No facility-administered encounter medications on file as of 2019.        Continuous Infusions:  No current facility-administered medications for this visit.     PRN Meds:.    Allergies   Allergen Reactions   • Ciprofloxacin        Social History     Socioeconomic History   • Marital status:      Spouse name: Not on file   • Number of children: Not on file   • Years of education: Not on file   • Highest education level: Not on file   Tobacco Use   • Smoking status: Former Smoker     Packs/day: 0.25     Types: Cigarettes     Last attempt to quit: 1981     Years since quittin.5   • Smokeless tobacco: Former User   Substance and Sexual Activity   • Alcohol use: Yes     Alcohol/week: 1.2 oz     Types: 2 Shots of liquor per week     Comment: socially   • Drug use: No   • Sexual activity: Defer       Family History   Problem Relation Age of Onset   • Hypertension Mother    • Stroke Mother    • Asthma Sister    • Ulcerative colitis Sister        Review of Systems   Constitutional: Negative for activity change, appetite change, fatigue, fever and unexpected weight change.   HENT: Negative for trouble swallowing.    Respiratory: Negative for apnea, cough, choking,  chest tightness, shortness of breath and wheezing.    Cardiovascular: Negative for chest pain, palpitations and leg swelling.   Gastrointestinal: Negative for abdominal distention, abdominal pain, anal bleeding, blood in stool, constipation, diarrhea, nausea, rectal pain and vomiting.       Vitals:    07/23/19 1118   BP: 162/88   Temp: 98.7 °F (37.1 °C)       Physical Exam   Constitutional: He is oriented to person, place, and time. He appears well-developed and well-nourished.   Eyes: Pupils are equal, round, and reactive to light.   Cardiovascular: Normal rate, regular rhythm and normal heart sounds.   Pulmonary/Chest: Effort normal and breath sounds normal. No respiratory distress. He has no wheezes.   Abdominal: Soft. Bowel sounds are normal. He exhibits no distension and no mass. There is no tenderness. There is no guarding. No hernia.   Musculoskeletal: Normal range of motion.   Neurological: He is alert and oriented to person, place, and time.   Skin: Skin is warm and dry. Capillary refill takes less than 2 seconds.   Psychiatric: He has a normal mood and affect. His behavior is normal.       No images are attached to the encounter.    Bernard was seen today for ulcerative colitis.    Diagnoses and all orders for this visit:    Ulcerative colitis without complications, unspecified location (CMS/[facility])  -     CBC & Differential  -     Comprehensive Metabolic Panel  -     Case Request; Standing  -     Case Request    Impression:    This is a pleasant 79-year-old male with a long-standing history of ulcerative colitis seen today in follow-up for recent flare treated with steroids.  Patient responding well to steroid treatment.  At this point continue with taper.  Continue balsalazide.  CBC and CMP today.  Plans for colonoscopy in November, give patient time to recover from recent flare.  I did  the patient staying up-to-date on his immunizations and routine physical with his primary care provider.  For now  follow-up further recommendations pending serology from today and endoscopic findings in the fall.  Return to clinic 6 months.

## 2019-07-24 ENCOUNTER — TELEPHONE (OUTPATIENT)
Dept: GASTROENTEROLOGY | Facility: CLINIC | Age: 79
End: 2019-07-24

## 2019-07-24 LAB
ALBUMIN SERPL-MCNC: 4.2 G/DL (ref 3.5–5.2)
ALBUMIN/GLOB SERPL: 2.2 G/DL
ALP SERPL-CCNC: 62 U/L (ref 39–117)
ALT SERPL-CCNC: 10 U/L (ref 1–41)
AST SERPL-CCNC: 15 U/L (ref 1–40)
BASOPHILS # BLD AUTO: 0.03 10*3/MM3 (ref 0–0.2)
BASOPHILS NFR BLD AUTO: 0.3 % (ref 0–1.5)
BILIRUB SERPL-MCNC: 0.6 MG/DL (ref 0.2–1.2)
BUN SERPL-MCNC: 26 MG/DL (ref 8–23)
BUN/CREAT SERPL: 23.4 (ref 7–25)
CALCIUM SERPL-MCNC: 9.1 MG/DL (ref 8.6–10.5)
CHLORIDE SERPL-SCNC: 102 MMOL/L (ref 98–107)
CO2 SERPL-SCNC: 29.6 MMOL/L (ref 22–29)
CREAT SERPL-MCNC: 1.11 MG/DL (ref 0.76–1.27)
EOSINOPHIL # BLD AUTO: 0.08 10*3/MM3 (ref 0–0.4)
EOSINOPHIL NFR BLD AUTO: 0.7 % (ref 0.3–6.2)
ERYTHROCYTE [DISTWIDTH] IN BLOOD BY AUTOMATED COUNT: 14.7 % (ref 12.3–15.4)
GLOBULIN SER CALC-MCNC: 1.9 GM/DL
GLUCOSE SERPL-MCNC: 96 MG/DL (ref 65–99)
HCT VFR BLD AUTO: 49.4 % (ref 37.5–51)
HGB BLD-MCNC: 15.6 G/DL (ref 13–17.7)
IMM GRANULOCYTES # BLD AUTO: 0.1 10*3/MM3 (ref 0–0.05)
IMM GRANULOCYTES NFR BLD AUTO: 0.9 % (ref 0–0.5)
LYMPHOCYTES # BLD AUTO: 1.16 10*3/MM3 (ref 0.7–3.1)
LYMPHOCYTES NFR BLD AUTO: 10.4 % (ref 19.6–45.3)
MCH RBC QN AUTO: 28.6 PG (ref 26.6–33)
MCHC RBC AUTO-ENTMCNC: 31.6 G/DL (ref 31.5–35.7)
MCV RBC AUTO: 90.6 FL (ref 79–97)
MONOCYTES # BLD AUTO: 0.53 10*3/MM3 (ref 0.1–0.9)
MONOCYTES NFR BLD AUTO: 4.8 % (ref 5–12)
NEUTROPHILS # BLD AUTO: 9.23 10*3/MM3 (ref 1.7–7)
NEUTROPHILS NFR BLD AUTO: 82.9 % (ref 42.7–76)
NRBC BLD AUTO-RTO: 0 /100 WBC (ref 0–0.2)
PLATELET # BLD AUTO: 232 10*3/MM3 (ref 140–450)
POTASSIUM SERPL-SCNC: 5.3 MMOL/L (ref 3.5–5.2)
PROT SERPL-MCNC: 6.1 G/DL (ref 6–8.5)
RBC # BLD AUTO: 5.45 10*6/MM3 (ref 4.14–5.8)
SODIUM SERPL-SCNC: 142 MMOL/L (ref 136–145)
WBC # BLD AUTO: 11.13 10*3/MM3 (ref 3.4–10.8)

## 2019-07-24 NOTE — PROGRESS NOTES
Would you like for me to have him follow-up with his primary care provider to look into other causes of elevated white blood cells?

## 2019-07-24 NOTE — PROGRESS NOTES
Patient seen yesterday in follow-up.  UC flare was resolving patient was on the tale in of prednisone taper. Had not had labs in a while.  Labs show slightly elevated white blood cell count. ?  From resolving flare?  Your thoughts?  BG

## 2019-07-24 NOTE — TELEPHONE ENCOUNTER
----- Message from ANTELMO Marroquin sent at 7/23/2019  3:23 PM EDT -----  Regarding: [facility]  Can we get this patient on at [place].  Looks like colonoscopies been scheduled but I cannot tell the location.  Thanks BG    ----- Message -----  From: Calvin Gifford MD  Sent: 7/23/2019   1:27 PM  To: ANTELMO Marroquin  Subject: yes                                              Excellent, I think November is a good time to do it, I would encourage him to go to [place] as we have gastrointestinal pathologist there and we will be looking for dysplasia    thx!    ----- Message -----  From: Bibi Griffith APRN  Sent: 7/23/2019  11:53 AM  To: Calvin Gifford MD    UC patient treated for flare in June seen today in follow-up.  Doing well.  Tapering steroids, CBC and CMP today.  He is due for colonoscopy.  I am going to schedule him for a scope with you in November to give him time to recover from recent flare.  Hope you are okay with that.  Let me know if you have me to change anything.  Thanks BG

## 2019-08-15 ENCOUNTER — OFFICE VISIT (OUTPATIENT)
Dept: FAMILY MEDICINE CLINIC | Facility: CLINIC | Age: 79
End: 2019-08-15

## 2019-08-15 VITALS
TEMPERATURE: 98.2 F | HEART RATE: 54 BPM | BODY MASS INDEX: 31.64 KG/M2 | DIASTOLIC BLOOD PRESSURE: 70 MMHG | HEIGHT: 71 IN | OXYGEN SATURATION: 98 % | RESPIRATION RATE: 14 BRPM | SYSTOLIC BLOOD PRESSURE: 122 MMHG | WEIGHT: 226 LBS

## 2019-08-15 DIAGNOSIS — N18.30 CHRONIC KIDNEY DISEASE, STAGE III (MODERATE) (HCC): ICD-10-CM

## 2019-08-15 DIAGNOSIS — G47.00 INSOMNIA, UNSPECIFIED TYPE: ICD-10-CM

## 2019-08-15 DIAGNOSIS — E78.5 HYPERLIPIDEMIA, UNSPECIFIED HYPERLIPIDEMIA TYPE: Primary | ICD-10-CM

## 2019-08-15 DIAGNOSIS — I10 ESSENTIAL HYPERTENSION: ICD-10-CM

## 2019-08-15 DIAGNOSIS — R73.09 ELEVATED GLUCOSE: ICD-10-CM

## 2019-08-15 PROCEDURE — 99214 OFFICE O/P EST MOD 30 MIN: CPT | Performed by: FAMILY MEDICINE

## 2019-08-15 RX ORDER — ATENOLOL 50 MG/1
50 TABLET ORAL DAILY
Qty: 90 TABLET | Refills: 3 | Status: SHIPPED | OUTPATIENT
Start: 2019-08-15 | End: 2020-08-04 | Stop reason: SDUPTHER

## 2019-08-15 RX ORDER — ZOLPIDEM TARTRATE 5 MG/1
5 TABLET ORAL NIGHTLY PRN
Qty: 90 TABLET | Refills: 0 | Status: SHIPPED | OUTPATIENT
Start: 2019-08-15 | End: 2019-11-21 | Stop reason: SDUPTHER

## 2019-08-15 RX ORDER — LISINOPRIL 40 MG/1
40 TABLET ORAL DAILY
Qty: 90 TABLET | Refills: 3 | Status: SHIPPED | OUTPATIENT
Start: 2019-08-15 | End: 2020-08-04 | Stop reason: SDUPTHER

## 2019-08-15 NOTE — PROGRESS NOTES
Bernard Resendez is a 79 y.o. male.     Chief Complaint   Patient presents with   • Insomnia     patient needs refill on ambien.       HPI     Patient presents the office today with a history of insomnia, hypertension, hyperlipidemia, chronic kidney disease, and elevated glucose levels.  Taking and tolerating atenolol, lisinopril, and 5 mg of Ambien on a as needed basis.  No adverse side effects noted.  Tolerating therapy well.  Tries to maintain healthy diet and exercise regimen.    The following portions of the patient's history were reviewed and updated as appropriate: allergies, current medications, past family history, past medical history, past social history, past surgical history and problem list.    Review of Systems   Psychiatric/Behavioral: Positive for sleep disturbance.   All other systems reviewed and are negative.    Reviewed and agree with documentation.    Objective  Vitals:    08/15/19 1359   BP: 122/70   Pulse: 54   Resp: 14   Temp: 98.2 °F (36.8 °C)   SpO2: 98%       Physical Exam   Constitutional: He is oriented to person, place, and time. He appears well-developed and well-nourished. No distress.   HENT:   Head: Normocephalic and atraumatic.   Right Ear: External ear normal.   Left Ear: External ear normal.   Nose: Nose normal.   Mouth/Throat: Oropharynx is clear and moist.   Eyes: Conjunctivae and EOM are normal. Pupils are equal, round, and reactive to light. Right eye exhibits no discharge. Left eye exhibits no discharge. No scleral icterus.   Cardiovascular: Normal rate, regular rhythm and normal heart sounds.   Pulmonary/Chest: Effort normal and breath sounds normal. No respiratory distress. He has no wheezes. He has no rales.   Abdominal: Soft. Bowel sounds are normal. He exhibits no distension. There is no tenderness.   Neurological: He is alert and oriented to person, place, and time.   Skin: Skin is warm and dry. He is not diaphoretic.   Nursing note and vitals reviewed.        Current  Outpatient Medications:   •  atenolol (TENORMIN) 50 MG tablet, Take 1 tablet by mouth Daily., Disp: 90 tablet, Rfl: 3  •  baclofen (LIORESAL) 10 MG tablet, Take 1 tablet by mouth 2 (Two) Times a Day., Disp: 60 tablet, Rfl: 5  •  balsalazide (COLAZAL) 750 MG capsule, Take 3 capsules by mouth 3 (Three) Times a Day., Disp: 270 capsule, Rfl: 11  •  Cholecalciferol (VITAMIN D3) 1000 UNITS capsule, Take by mouth daily., Disp: , Rfl:   •  lisinopril (PRINIVIL,ZESTRIL) 40 MG tablet, Take 1 tablet by mouth Daily., Disp: 90 tablet, Rfl: 3  •  melatonin 5 MG tablet tablet, Take 5 mg by mouth Every Night., Disp: , Rfl:   •  Multiple Vitamins-Minerals (CENTRUM SILVER ADULT 50+ PO), Take by mouth., Disp: , Rfl:   •  Omega-3 Fatty Acids (FISH OIL) 1200 MG capsule capsule, Take by mouth., Disp: , Rfl:   •  zolpidem (AMBIEN) 5 MG tablet, Take 1 tablet by mouth At Night As Needed for Sleep., Disp: 90 tablet, Rfl: 0  Current outpatient and discharge medications have been reconciled for the patient.  Reviewed by: Laci Mcnair MD      Procedures    Lab Results (most recent)     None                  Bernard was seen today for insomnia.    Diagnoses and all orders for this visit:    Hyperlipidemia, unspecified hyperlipidemia type  -     Comprehensive Metabolic Panel  -     Hemoglobin A1c  -     Lipid Panel  -     CBC Auto Differential    Insomnia, unspecified type  -     zolpidem (AMBIEN) 5 MG tablet; Take 1 tablet by mouth At Night As Needed for Sleep.  -     Comprehensive Metabolic Panel  -     Hemoglobin A1c  -     Lipid Panel  -     CBC Auto Differential    Essential hypertension  -     atenolol (TENORMIN) 50 MG tablet; Take 1 tablet by mouth Daily.  -     lisinopril (PRINIVIL,ZESTRIL) 40 MG tablet; Take 1 tablet by mouth Daily.  -     Comprehensive Metabolic Panel  -     Hemoglobin A1c  -     Lipid Panel  -     CBC Auto Differential    Chronic kidney disease, stage III (moderate) (CMS/[facility])  -     Comprehensive Metabolic Panel  -      Hemoglobin A1c  -     Lipid Panel  -     CBC Auto Differential    Elevated glucose  -     Comprehensive Metabolic Panel  -     Hemoglobin A1c  -     Lipid Panel  -     CBC Auto Differential    Will get labs as above to evaluate the status of these chronic medical problems.  Refills as above.      Return in about 3 months (around 11/15/2019) for Recheck.      Laci Mcnair MD

## 2019-11-04 ENCOUNTER — OFFICE VISIT (OUTPATIENT)
Dept: GASTROENTEROLOGY | Facility: CLINIC | Age: 79
End: 2019-11-04

## 2019-11-04 VITALS
WEIGHT: 233.2 LBS | TEMPERATURE: 98.2 F | SYSTOLIC BLOOD PRESSURE: 132 MMHG | DIASTOLIC BLOOD PRESSURE: 76 MMHG | HEIGHT: 71 IN | BODY MASS INDEX: 32.65 KG/M2

## 2019-11-04 DIAGNOSIS — K51.90 ULCERATIVE COLITIS WITHOUT COMPLICATIONS, UNSPECIFIED LOCATION (HCC): Primary | ICD-10-CM

## 2019-11-04 DIAGNOSIS — K51.311 ULCERATIVE RECTOSIGMOIDITIS WITH RECTAL BLEEDING (HCC): ICD-10-CM

## 2019-11-04 PROCEDURE — 99213 OFFICE O/P EST LOW 20 MIN: CPT | Performed by: INTERNAL MEDICINE

## 2019-11-04 RX ORDER — ASCORBIC ACID 500 MG
1000 TABLET ORAL DAILY
COMMUNITY

## 2019-11-04 NOTE — PROGRESS NOTES
Chief Complaint   Patient presents with   • Ulcerative Colitis       Bernard Resendez is a  79 y.o. male here for a follow up visit for left-sided ulcerative colitis greater than 30 years    79-year-old with left-sided ulcerative colitis diagnosed 30 years ago.  August 2017 surveillance colonoscopy without dysphasia  He continues on balsalazide 3 tablets 3 times a day  He did have a flare 5 months ago requiring steroid taper  Currently no rectal pain, rectal bleeding, diarrhea, abdominal pain or extraintestinal manifestations of inflammatory bowel disease  CBC and CMP 4 months ago were normal        Past Medical History:   Diagnosis Date   • Acute renal failure (CMS/HCC)    • Diverticulosis    • Hearing difficulty    • History of shingles    • Hyperlipidemia    • Hypertension    • Hypogonadism in male    • Insomnia    • Nephrolithiasis    • Osteopenia    • Osteopenia    • Renal insufficiency    • Ulcerative colitis (CMS/HCC)        Past Surgical History:   Procedure Laterality Date   • COLONOSCOPY  06/11/2015    [facility], actuve colitis w/cryptitis, rare crypt abscess & superficial erosion, hyperplastic polyp   • COLONOSCOPY N/A 8/8/2017    [facility], nodular mucosa in the proximal rectum and in the distal rectum, Diverticulosis in the sigmoid colon, the descending colon and at the splenic flexure.  PATH: Hyperplastic polyps    • EYE SURGERY      cataract   • KIDNEY STONE SURGERY     • TUMOR EXCISION      rectal tumor       Scheduled Meds:    Continuous Infusions:  No current facility-administered medications for this visit.     PRN Meds:.    Allergies   Allergen Reactions   • Ciprofloxacin        Social History     Socioeconomic History   • Marital status:      Spouse name: Not on file   • Number of children: Not on file   • Years of education: Not on file   • Highest education level: Not on file   Tobacco Use   • Smoking status: Former Smoker     Packs/day: 0.25     Types: Cigarettes     Last attempt to quit: 1981      Years since quittin.8   • Smokeless tobacco: Former User   Substance and Sexual Activity   • Alcohol use: Yes     Alcohol/week: 1.2 oz     Types: 2 Shots of liquor per week     Comment: socially   • Drug use: No   • Sexual activity: Defer       Family History   Problem Relation Age of Onset   • Hypertension Mother    • Stroke Mother    • Asthma Sister    • Ulcerative colitis Sister        Review of Systems   Gastrointestinal: Negative for abdominal distention, anal bleeding, constipation, nausea and vomiting.   All other systems reviewed and are negative.      There were no vitals filed for this visit.    Physical Exam   Constitutional: He is oriented to person, place, and time. He appears well-developed and well-nourished.   HENT:   Head: Normocephalic and atraumatic.   Eyes: Conjunctivae and EOM are normal.   Neck: Normal range of motion. No tracheal deviation present.   Cardiovascular: Normal rate and regular rhythm.   Pulmonary/Chest: Effort normal and breath sounds normal. No respiratory distress.   Abdominal: Soft. Bowel sounds are normal. He exhibits no distension and no mass. There is no tenderness. There is no rebound and no guarding.   Musculoskeletal: Normal range of motion.   Neurological: He is alert and oriented to person, place, and time.   Skin: Skin is warm and dry.   Psychiatric: He has a normal mood and affect. Judgment normal.   Nursing note and vitals reviewed.      No images are attached to the encounter.    Problem list    Should have colitis for 30 years left-sided  Recent flare but he has moved through it with steroids  Off of steroids now  Continues on balsalazide  Surveillance colonoscopy needed this year      Assessment/Plan    Colonoscopy 2019  Office visit 6 months  Continue balsalazide  Further recommendations after colonoscopy is complete and biopsies reviewed

## 2019-11-19 ENCOUNTER — ANESTHESIA (OUTPATIENT)
Dept: GASTROENTEROLOGY | Facility: HOSPITAL | Age: 79
End: 2019-11-19

## 2019-11-19 ENCOUNTER — HOSPITAL ENCOUNTER (OUTPATIENT)
Facility: HOSPITAL | Age: 79
Setting detail: HOSPITAL OUTPATIENT SURGERY
Discharge: HOME | End: 2019-11-19
Attending: INTERNAL MEDICINE | Admitting: INTERNAL MEDICINE

## 2019-11-19 ENCOUNTER — ANESTHESIA EVENT (OUTPATIENT)
Dept: GASTROENTEROLOGY | Facility: HOSPITAL | Age: 79
End: 2019-11-19

## 2019-11-19 VITALS
DIASTOLIC BLOOD PRESSURE: 85 MMHG | HEART RATE: 59 BPM | SYSTOLIC BLOOD PRESSURE: 154 MMHG | BODY MASS INDEX: 32.34 KG/M2 | HEIGHT: 71 IN | TEMPERATURE: 98 F | OXYGEN SATURATION: 93 % | RESPIRATION RATE: 16 BRPM | WEIGHT: 231 LBS

## 2019-11-19 DIAGNOSIS — K51.90 ULCERATIVE COLITIS WITHOUT COMPLICATIONS, UNSPECIFIED LOCATION (HCC): ICD-10-CM

## 2019-11-19 PROCEDURE — 88305 TISSUE EXAM BY PATHOLOGIST: CPT | Performed by: INTERNAL MEDICINE

## 2019-11-19 PROCEDURE — 45380 COLONOSCOPY AND BIOPSY: CPT | Performed by: INTERNAL MEDICINE

## 2019-11-19 PROCEDURE — 25010000002 PROPOFOL 10 MG/ML EMULSION: Performed by: ANESTHESIOLOGY

## 2019-11-19 RX ORDER — SODIUM CHLORIDE, SODIUM LACTATE, POTASSIUM CHLORIDE, CALCIUM CHLORIDE 600; 310; 30; 20 MG/100ML; MG/100ML; MG/100ML; MG/100ML
1000 INJECTION, SOLUTION INTRAVENOUS CONTINUOUS
Status: DISCONTINUED | OUTPATIENT
Start: 2019-11-19 | End: 2019-11-19 | Stop reason: HOSPADM

## 2019-11-19 RX ORDER — PROPOFOL 10 MG/ML
VIAL (ML) INTRAVENOUS CONTINUOUS PRN
Status: DISCONTINUED | OUTPATIENT
Start: 2019-11-19 | End: 2019-11-19 | Stop reason: SURG

## 2019-11-19 RX ORDER — PROPOFOL 10 MG/ML
VIAL (ML) INTRAVENOUS AS NEEDED
Status: DISCONTINUED | OUTPATIENT
Start: 2019-11-19 | End: 2019-11-19 | Stop reason: SURG

## 2019-11-19 RX ORDER — LIDOCAINE HYDROCHLORIDE 20 MG/ML
INJECTION, SOLUTION INFILTRATION; PERINEURAL AS NEEDED
Status: DISCONTINUED | OUTPATIENT
Start: 2019-11-19 | End: 2019-11-19 | Stop reason: SURG

## 2019-11-19 RX ADMIN — SODIUM CHLORIDE, POTASSIUM CHLORIDE, SODIUM LACTATE AND CALCIUM CHLORIDE 1000 ML: 600; 310; 30; 20 INJECTION, SOLUTION INTRAVENOUS at 12:40

## 2019-11-19 RX ADMIN — LIDOCAINE HYDROCHLORIDE 50 MG: 20 INJECTION, SOLUTION INFILTRATION; PERINEURAL at 13:24

## 2019-11-19 RX ADMIN — PROPOFOL 125 MG: 10 INJECTION, EMULSION INTRAVENOUS at 13:24

## 2019-11-19 RX ADMIN — PROPOFOL 140 MCG/KG/MIN: 10 INJECTION, EMULSION INTRAVENOUS at 13:24

## 2019-11-19 NOTE — ANESTHESIA PREPROCEDURE EVALUATION
Anesthesia Evaluation     Patient summary reviewed and Nursing notes reviewed   NPO Solid Status: > 8 hours  NPO Liquid Status: > 8 hours           Airway   Mallampati: II  TM distance: >3 FB  Neck ROM: full  No difficulty expected  Dental - normal exam     Pulmonary     breath sounds clear to auscultation  Cardiovascular     Patient on routine beta blocker  Rhythm: regular  Rate: normal    (+) hypertension 2 medications or greater,       Neuro/Psych  GI/Hepatic/Renal/Endo    (+) obesity,   renal disease CRI,     Musculoskeletal     Abdominal    Substance History      OB/GYN          Other                      Anesthesia Plan    ASA 2     MAC     intravenous induction     Anesthetic plan, all risks, benefits, and alternatives have been provided, discussed and informed consent has been obtained with: patient.

## 2019-11-19 NOTE — ANESTHESIA POSTPROCEDURE EVALUATION
Patient: Bernard Resendez    Procedure Summary     Date:  11/19/19 Room / Location:   [facility] /  [facility]    Anesthesia Start:  1319 Anesthesia Stop:  1350    Procedure:  COLONOSCOPY to cecum with biopsies (N/A ) Diagnosis:       Ulcerative colitis without complications, unspecified location (CMS/HCC)      (Ulcerative colitis without complications, unspecified location (CMS/HCC) [K51.90])    Surgeon:  Calvin Gifford MD Provider:  Bernard Negron MD    Anesthesia Type:  MAC ASA Status:  2          Anesthesia Type: MAC  Last vitals  BP   154/85 (11/19/19 1412)   Temp   36.7 °C (98 °F) (11/19/19 1232)   Pulse   59 (11/19/19 1412)   Resp   16 (11/19/19 1412)     SpO2   93 % (11/19/19 1412)     Post Anesthesia Care and Evaluation    Patient location during evaluation: bedside  Patient participation: complete - patient participated  Level of consciousness: awake and alert  Pain score: 0  Pain management: adequate  Airway patency: patent  Anesthetic complications: No anesthetic complications  PONV Status: none  Cardiovascular status: acceptable  Respiratory status: acceptable  Hydration status: acceptable  Post Neuraxial Block status: Motor and sensory function returned to baseline

## 2019-11-19 NOTE — DISCHARGE INSTRUCTIONS
For the next 24 hours patient needs to be with a responsible adult.    For 24 hours DO NOT drive, operate machinery, appliances, drink alcohol, make important decisions or sign legal documents.    Start with a light or bland diet if you are feeling sick to your stomach otherwise advance to regular diet as tolerated.    Follow recommendations on procedure report if provided by your doctor.    Call Dr Gifford [phone]    Problems may include but not limited to: large amounts of bleeding, trouble breathing, repeated vomiting, severe unrelieved pain, fever or chills.

## 2019-11-20 LAB
CYTO UR: NORMAL
LAB AP CASE REPORT: NORMAL
PATH REPORT.FINAL DX SPEC: NORMAL
PATH REPORT.GROSS SPEC: NORMAL

## 2019-11-21 DIAGNOSIS — G47.00 INSOMNIA, UNSPECIFIED TYPE: ICD-10-CM

## 2019-11-21 RX ORDER — ZOLPIDEM TARTRATE 5 MG/1
5 TABLET ORAL NIGHTLY PRN
Qty: 90 TABLET | Refills: 0 | Status: SHIPPED | OUTPATIENT
Start: 2019-11-21 | End: 2020-02-18 | Stop reason: SDUPTHER

## 2019-11-23 NOTE — PROGRESS NOTES
No evidence of dysplasia  Continue current medications for inflammatory bowel disease  Colonoscopy recall 2 years

## 2019-11-25 ENCOUNTER — TELEPHONE (OUTPATIENT)
Dept: GASTROENTEROLOGY | Facility: CLINIC | Age: 79
End: 2019-11-25

## 2019-11-25 NOTE — TELEPHONE ENCOUNTER
Notes recorded by Calvin Gifford MD on 11/23/2019 at 2:33 PM EST  No evidence of dysplasia  Continue current medications for inflammatory bowel disease  Colonoscopy recall 2 years

## 2019-11-25 NOTE — TELEPHONE ENCOUNTER
----- Message from Buddy Winn sent at 11/25/2019 10:29 AM EST -----  Regarding: not feeling well after scope   Contact: [phone]  Pt stated had an scope last week and still having bleeding , loose stool.

## 2019-11-25 NOTE — TELEPHONE ENCOUNTER
Called pt back. Pt states he had a colonoscopy last week on Tuesday and he has been having problems since then with gas, and leakage of mucous with blood in it. He states he has to wear a pad in his shorts because of it. He had pretty loose stool just after the scope, but it is starting to form up now. Right now taking balsalazide 3 tablets 3 times per day. Last took prednisone 4-5 months ago for a flare. Pt denies abdominal pain, nausea, vomiting fever, and chills. Advised that per Dr Gifford: biopsies taken during the colonoscopy showed no evidence of precancer and he will need repeat C/S in 2 years for continued surveillance. Advised will update MD about his symptoms and will nitin back. Pt verb understanding.      Health Maintenance updated to reflect C/S due 11/2021.

## 2019-11-26 RX ORDER — PREDNISONE 10 MG/1
TABLET ORAL
Qty: 70 TABLET | Refills: 0 | Status: SHIPPED | OUTPATIENT
Start: 2019-11-26 | End: 2020-02-18

## 2019-11-26 NOTE — TELEPHONE ENCOUNTER
"Per Dr Gifford: \"Lets start him on a little steroid enema, Jared enema 100 mg and 60 mL, 1 per rectum nightly for 30 days\"     "

## 2019-11-26 NOTE — TELEPHONE ENCOUNTER
"Per Dr Gifford: \"There are no problem, prednisone 10 mg tablets he is to take 40 mg a day before a week, 30 mg a day for a week, 20 mg a day for a week, 10 mg a day for a week and then stop, quantity sufficient for taper\"     Called pt and advised Dr Gifford gave the OK for a prednisone taper. Advised this has been sent to his [facility]. Pt verb understanding.    "

## 2019-11-26 NOTE — TELEPHONE ENCOUNTER
Called pt and advised of the note from Dr Gifford. 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 He has a lot of anal leakage and cannot hold the enemas in the rectum like they should; they just come right back out again. Can Dr Gifford prescribe oral steroids instead? Advised will update MD. Pt verb understanding.

## 2020-02-11 DIAGNOSIS — G47.00 INSOMNIA, UNSPECIFIED TYPE: ICD-10-CM

## 2020-02-11 RX ORDER — ZOLPIDEM TARTRATE 5 MG/1
TABLET ORAL
Qty: 90 TABLET | Refills: 0 | OUTPATIENT
Start: 2020-02-11

## 2020-02-13 ENCOUNTER — TELEPHONE (OUTPATIENT)
Dept: GASTROENTEROLOGY | Facility: CLINIC | Age: 80
End: 2020-02-13

## 2020-02-13 DIAGNOSIS — R19.7 DIARRHEA, UNSPECIFIED TYPE: Primary | ICD-10-CM

## 2020-02-13 NOTE — TELEPHONE ENCOUNTER
Called pt back. Pt states he had a flare-up of his UC after his colonoscopy in November. He took a 4 week Prednisone taper and it did get him feeling better, but it took a couple weeks to do so. He did well for some time, but his symptoms returned about 1 week ago. He is currently having anal leakage of liquid stool/mucous, and 2-3 soft stools per day. He initially had abdominal cramping but that has subsided. Pt states he has not seen any rectal bleeding yet. Pt denies nausea and vomiting. Advised will update Dr Gifford and call back with recs. Pt verb understanding.

## 2020-02-13 NOTE — TELEPHONE ENCOUNTER
----- Message from Tio Callejas Rep sent at 2/13/2020  9:55 AM EST -----  Regarding: MED REFILL   Contact: [phone]  PT HAS QUESTIONS    predniSONE     OSMANY MARINELLI [address] [place] [address]  [phone]

## 2020-02-14 NOTE — TELEPHONE ENCOUNTER
Patient call, advised as per Dr. Gifford's note. Patient states he has left over Prednisone, he questions if he can start taking it. Advised no. Advise if he started Prednisone and he was positive for c.dfficle, he could have adverse reaction to the medication. He verb understanding and will  the stool kit today. Kit left at the  for patient to pick at his convenience.

## 2020-02-14 NOTE — TELEPHONE ENCOUNTER
Per Dr. Gifford: Check C. Difficile and fecal nitin, when I know that is negative I can treat him for a UC flare possibly with steroids possibly with Entocort, possibly with topical therapy possibly with antibiotic

## 2020-02-18 ENCOUNTER — OFFICE VISIT (OUTPATIENT)
Dept: FAMILY MEDICINE CLINIC | Facility: CLINIC | Age: 80
End: 2020-02-18

## 2020-02-18 VITALS
HEIGHT: 71 IN | TEMPERATURE: 98.1 F | BODY MASS INDEX: 32.62 KG/M2 | WEIGHT: 233 LBS | DIASTOLIC BLOOD PRESSURE: 86 MMHG | SYSTOLIC BLOOD PRESSURE: 130 MMHG | RESPIRATION RATE: 16 BRPM | HEART RATE: 63 BPM | OXYGEN SATURATION: 98 %

## 2020-02-18 DIAGNOSIS — G47.00 INSOMNIA, UNSPECIFIED TYPE: ICD-10-CM

## 2020-02-18 LAB — C DIFF TOX A+B STL QL IA: NEGATIVE

## 2020-02-18 PROCEDURE — 99214 OFFICE O/P EST MOD 30 MIN: CPT | Performed by: FAMILY MEDICINE

## 2020-02-18 RX ORDER — BUDESONIDE 3 MG/1
9 CAPSULE, COATED PELLETS ORAL DAILY
Qty: 90 CAPSULE | Refills: 3 | Status: SHIPPED | OUTPATIENT
Start: 2020-02-18 | End: 2020-04-27

## 2020-02-18 RX ORDER — ZOLPIDEM TARTRATE 5 MG/1
5 TABLET ORAL NIGHTLY PRN
Qty: 90 TABLET | Refills: 0 | Status: SHIPPED | OUTPATIENT
Start: 2020-02-18 | End: 2020-05-12 | Stop reason: SDUPTHER

## 2020-02-18 NOTE — TELEPHONE ENCOUNTER
Called pt back. Pt states he spoke last Friday he called the office that he is having a lot of issues. Dr Gifford wanted to do a stool sample first so he came, got the kit and he dropped the stool sample off on Monday. He was calling to see where we go from here, he is not getting any better. Advised Dr Gifford has signed off that the stool was negative for infection and he has asked the NP, Bibi, to write a script for Prednisone. Advised once she writes script will let him know we are calling it in. Pt verb understanding.

## 2020-02-18 NOTE — TELEPHONE ENCOUNTER
C. difficile negative  We can start steroids, Bibi were you go to do prednisone 40 a day and taper over 8 weeks?

## 2020-02-18 NOTE — PROGRESS NOTES
Bernard Resendez is a 79 y.o. male.     Chief Complaint   Patient presents with   • Insomnia     patient is following up on insomnia , refill on ambien        HPI     Patient presents the office today to discuss insomnia.  Taking Ambien 5 mg nightly.  No adverse side effects noted.  No issues with eating while asleep or hallucinating.    The following portions of the patient's history were reviewed and updated as appropriate: allergies, current medications, past family history, past medical history, past social history, past surgical history and problem list.    Review of Systems   Psychiatric/Behavioral: Positive for sleep disturbance.   All other systems reviewed and are negative.    I have reviewed the ROS as documented by the MA. Laci Mcnair MD    Objective  Vitals:    02/18/20 1317   BP: 130/86   Pulse: 63   Resp: 16   Temp: 98.1 °F (36.7 °C)   SpO2: 98%     Body mass index is 32.5 kg/m².    Physical Exam   Constitutional: He is oriented to person, place, and time. He appears well-developed and well-nourished. No distress.   HENT:   Head: Normocephalic and atraumatic.   Right Ear: External ear normal.   Left Ear: External ear normal.   Nose: Nose normal.   Mouth/Throat: Oropharynx is clear and moist.   Eyes: Pupils are equal, round, and reactive to light. Conjunctivae and EOM are normal. Right eye exhibits no discharge. Left eye exhibits no discharge. No scleral icterus.   Cardiovascular: Normal rate, regular rhythm and normal heart sounds.   Pulmonary/Chest: Effort normal and breath sounds normal. No respiratory distress. He has no wheezes. He has no rales.   Abdominal: Soft. Bowel sounds are normal. He exhibits no distension. There is no tenderness.   Neurological: He is alert and oriented to person, place, and time.   Skin: Skin is warm and dry. He is not diaphoretic.   Nursing note and vitals reviewed.        Current Outpatient Medications:   •  atenolol (TENORMIN) 50 MG tablet, Take 1 tablet by mouth  Daily., Disp: 90 tablet, Rfl: 3  •  balsalazide (COLAZAL) 750 MG capsule, Take 3 capsules by mouth 3 (Three) Times a Day., Disp: 270 capsule, Rfl: 11  •  Cholecalciferol (VITAMIN D3) 1000 UNITS capsule, Take by mouth daily., Disp: , Rfl:   •  lisinopril (PRINIVIL,ZESTRIL) 40 MG tablet, Take 1 tablet by mouth Daily., Disp: 90 tablet, Rfl: 3  •  melatonin 5 MG tablet tablet, Take 5 mg by mouth Every Night., Disp: , Rfl:   •  Multiple Vitamins-Minerals (CENTRUM SILVER ADULT 50+ PO), Take by mouth., Disp: , Rfl:   •  Omega-3 Fatty Acids (FISH OIL) 1200 MG capsule capsule, Take by mouth., Disp: , Rfl:   •  vitamin C (ASCORBIC ACID) 500 MG tablet, Take 1,000 mg by mouth Daily., Disp: , Rfl:   •  zolpidem (AMBIEN) 5 MG tablet, Take 1 tablet by mouth At Night As Needed for Sleep., Disp: 90 tablet, Rfl: 0  •  Budesonide (ENTOCORT EC) 3 MG 24 hr capsule, Take 3 capsules by mouth Daily., Disp: 90 capsule, Rfl: 3  Current outpatient and discharge medications have been reconciled for the patient.  Reviewed by: Laci Mcnair MD      Procedures    Lab Results (most recent)     None                  Bernard was seen today for insomnia.    Diagnoses and all orders for this visit:    Insomnia, unspecified type  -     zolpidem (AMBIEN) 5 MG tablet; Take 1 tablet by mouth At Night As Needed for Sleep.      Refills as above.    15 minutes was spent of the 25 minute visit in direct face to face counseling and coordination of care regarding:   Encounter Diagnosis   Name Primary?   • Insomnia, unspecified type          Return in about 3 months (around 5/18/2020) for Recheck.      Laci Mcnair MD

## 2020-02-18 NOTE — TELEPHONE ENCOUNTER
Jared Zavala RegSched Rep  [facility] [address]   Phone Number: [phone]             Pt is wanting to talk to nurse said he is having problems and not getting any help.

## 2020-02-18 NOTE — TELEPHONE ENCOUNTER
I sent over prescription for budesonide.  His C. difficile is negative but he still needs complete fecal calprotectin ASAP.  I want him to f/u in the office in 2 weeks.

## 2020-02-19 NOTE — TELEPHONE ENCOUNTER
Joanne Candelaria [address]   Phone Number: [phone]             Pt calling regarding prescription was not called in

## 2020-02-19 NOTE — TELEPHONE ENCOUNTER
Mila Donis, Tio Rep  [facility] [address]   Phone Number: [phone]             CALL BACK     HAVING A PRESCRIPTION PROBLEM, WAY TOO EXPENSIVE ($1200)     Budesonide

## 2020-02-19 NOTE — TELEPHONE ENCOUNTER
Okay to use prednisone instead of Entocort.  Start prednisone 40 mg once daily for 1 week then decrease by 5 mg a day until off. (Please E scribe). I would orly to see him back in the office in 2 weeks.  Again please complete fecal calprotectin ASAP.

## 2020-02-19 NOTE — TELEPHONE ENCOUNTER
Prednisone script called into patient's [facility] pharmacy.  Patient called, advised will need another stool sample and his Prednisone was called into his [facility] pharmacy. He verb understanding and is agreeable to the plan. F/u appointment with Bibi, 3/11/20@8701.

## 2020-02-19 NOTE — TELEPHONE ENCOUNTER
Called pt back. Pt states this med is a little too expensive for him and he just recently moved to a new home. He is dealing with quite a bit of stress and he is on a fixed income. He would like to try Prednisone one more time if at all possible. Advised will update Bibi and call back with her recs. Pt verb understanding.

## 2020-02-19 NOTE — TELEPHONE ENCOUNTER
Called [facility] and spoke with the pharmacy tech. Advised calling to make sure they received the budesonide script from Bibi. She sates yes, they received script, it is covered and will cost pt $95 oop, it will be ready in 10 min.     Called pt... No answer and not able to leave a message.

## 2020-02-25 RX ORDER — BALSALAZIDE DISODIUM 750 MG/1
CAPSULE ORAL
Qty: 270 CAPSULE | Refills: 0 | Status: SHIPPED | OUTPATIENT
Start: 2020-02-25 | End: 2020-03-26 | Stop reason: SDUPTHER

## 2020-02-28 LAB — CALPROTECTIN STL-MCNT: 31 UG/G (ref 0–120)

## 2020-03-10 ENCOUNTER — TELEPHONE (OUTPATIENT)
Dept: GASTROENTEROLOGY | Facility: CLINIC | Age: 80
End: 2020-03-10

## 2020-03-10 NOTE — TELEPHONE ENCOUNTER
----- Message from Daphnie Ware sent at 3/10/2020  1:06 PM EDT -----  Regarding: RESULTS  Contact: [phone]  PATIENT CAME IN THE OFFICE TODAY THOUGHT HE HAVE APPOINTMENT, IT IS SCHEDULED FOR TOMORROW. HE SAID IT WAS ONLY FOR A FOLLOW UP  FOR RESULTS OF HIS STOOL STUDY. PATIENT DOES NOT WANT TO COME IN TOMORROW IF NOT NEEDED. PLEASE GIVE HIM A CALL REGARDING HIS RESULTS. PATIENT CAN BE REACHED AT THE NUMBER ABOVE.

## 2020-03-10 NOTE — TELEPHONE ENCOUNTER
Patient called. He requested his appointment with Bibi be pushed back to 4/27/20, closer to the end of his tapering Prednisone dose. Appointment scheduled and advised patient if he has worsening of symptoms to call back. He verb understanding.

## 2020-03-25 ENCOUNTER — TELEPHONE (OUTPATIENT)
Dept: GASTROENTEROLOGY | Facility: CLINIC | Age: 80
End: 2020-03-25

## 2020-03-25 NOTE — TELEPHONE ENCOUNTER
----- Message from ANTELMO Marroquin sent at 3/3/2020 10:57 AM EST -----  Please notify the patient that his stool test is normal regarding evaluation for intestinal inflammation.  Looks like he still needs to complete C. difficile stool test.

## 2020-03-26 ENCOUNTER — TELEPHONE (OUTPATIENT)
Dept: GASTROENTEROLOGY | Facility: CLINIC | Age: 80
End: 2020-03-26

## 2020-03-26 RX ORDER — BALSALAZIDE DISODIUM 750 MG/1
2250 CAPSULE ORAL 3 TIMES DAILY
Qty: 270 CAPSULE | Refills: 11 | Status: SHIPPED | OUTPATIENT
Start: 2020-03-26 | End: 2020-06-15

## 2020-03-26 NOTE — TELEPHONE ENCOUNTER
----- Message from Tio Callejas sent at 3/26/2020  9:55 AM EDT -----  Regarding: Med Refill   Contact: [phone]   Pt called to check on refill status of Balsalazide Disodium 750 MG. He stated pharmacy has sent over the request several times and he is completely out of his prescription.     Pt would also like to know why it's Dr. Amaro's name that is on his medication when he is a pt of Dr. Gifford?

## 2020-04-27 ENCOUNTER — OFFICE VISIT (OUTPATIENT)
Dept: GASTROENTEROLOGY | Facility: CLINIC | Age: 80
End: 2020-04-27

## 2020-04-27 VITALS — BODY MASS INDEX: 32.62 KG/M2 | WEIGHT: 233 LBS | HEIGHT: 71 IN

## 2020-04-27 DIAGNOSIS — K51.90 ULCERATIVE COLITIS WITHOUT COMPLICATIONS, UNSPECIFIED LOCATION (HCC): Primary | ICD-10-CM

## 2020-04-27 PROCEDURE — 99443 PR PHYS/QHP TELEPHONE EVALUATION 21-30 MIN: CPT | Performed by: NURSE PRACTITIONER

## 2020-04-27 NOTE — PROGRESS NOTES
Chief Complaint   Patient presents with   • Follow-up   • Ulcerative Colitis     HPI    You have chosen to receive care through a telephone visit. Do you consent to use a telephone visit for your medical care today? yes    Bernard Resendez is a  79 y.o. male here for a follow up visit for ulcerative colitis.  Colitis greater than 30 years.  This patient follows with Dr. Gifford, new to me.    Reviewed colonoscopy dated 11/19/2019:    Diverticulosis otherwise normal.  Repeat 2 years for surveillance purposes.  Path was benign.     Patient treated with budesonide in [facility] for flareup. Stool was negative for Cdiff.     He is seen today via telehealth visit/phone call to follow-up.  Patient reports being off steroids for 4 weeks.  He is having 1-2 bowel movements a day formed.  No abdominal pain, rectal pain, rectal bleeding, or diarrhea.  He is quite pleased with how he feels.  He continues on maintenance dose of balsalazide.    No nausea, vomiting, acid reflux/heartburn, or dysphagia.  Appetite is good.  His weight is stable.    Patient scheduled for labs with his PCP next month.    Past Medical History:   Diagnosis Date   • Acute renal failure (CMS/HCC)    • Diverticulosis    • Hearing difficulty    • History of shingles    • Hyperlipidemia    • Hypertension    • Hypogonadism in male    • Insomnia    • Nephrolithiasis    • Osteopenia    • Osteopenia    • Renal insufficiency    • Ulcerative colitis (CMS/HCC)        Past Surgical History:   Procedure Laterality Date   • COLONOSCOPY  06/11/2015    NBIH, actuve colitis w/cryptitis, rare crypt abscess & superficial erosion, hyperplastic polyp   • COLONOSCOPY N/A 8/8/2017    IH, nodular mucosa in the proximal rectum and in the distal rectum, Diverticulosis in the sigmoid colon, the descending colon and at the splenic flexure.  PATH: Hyperplastic polyps    • COLONOSCOPY N/A 11/19/2019    Procedure: COLONOSCOPY to cecum with biopsies;  Surgeon: Calvin Gifford MD;  Location:  [facility] ENDOSCOPY;  Service: Gastroenterology   • EYE SURGERY      cataract   • KIDNEY STONE SURGERY     • TUMOR EXCISION      rectal tumor       Scheduled Meds:  Outpatient Encounter Medications as of 2020   Medication Sig Dispense Refill   • atenolol (TENORMIN) 50 MG tablet Take 1 tablet by mouth Daily. 90 tablet 3   • balsalazide (COLAZAL) 750 MG capsule Take 3 capsules by mouth 3 (Three) Times a Day. 270 capsule 11   • Cholecalciferol (VITAMIN D3) 1000 UNITS capsule Take by mouth daily.     • lisinopril (PRINIVIL,ZESTRIL) 40 MG tablet Take 1 tablet by mouth Daily. 90 tablet 3   • melatonin 5 MG tablet tablet Take 5 mg by mouth Every Night.     • Multiple Vitamins-Minerals (CENTRUM SILVER ADULT 50+ PO) Take by mouth.     • Omega-3 Fatty Acids (FISH OIL) 1200 MG capsule capsule Take by mouth.     • vitamin C (ASCORBIC ACID) 500 MG tablet Take 1,000 mg by mouth Daily.     • zolpidem (AMBIEN) 5 MG tablet Take 1 tablet by mouth At Night As Needed for Sleep. 90 tablet 0   • [DISCONTINUED] Budesonide (ENTOCORT EC) 3 MG 24 hr capsule Take 3 capsules by mouth Daily. 90 capsule 3     No facility-administered encounter medications on file as of 2020.        Continuous Infusions:  No current facility-administered medications for this visit.     PRN Meds:.    Allergies   Allergen Reactions   • Ciprofloxacin Unknown (See Comments)     Pt unsure reaction       Social History     Socioeconomic History   • Marital status:      Spouse name: Not on file   • Number of children: Not on file   • Years of education: Not on file   • Highest education level: Not on file   Tobacco Use   • Smoking status: Former Smoker     Packs/day: 0.25     Types: Cigarettes     Last attempt to quit: 1981     Years since quittin.3   • Smokeless tobacco: Former User   Substance and Sexual Activity   • Alcohol use: Yes     Alcohol/week: 2.0 standard drinks     Types: 2 Shots of liquor per week     Comment: socially   • Drug use: No    • Sexual activity: Defer       Family History   Problem Relation Age of Onset   • Hypertension Mother    • Stroke Mother    • Asthma Sister    • Ulcerative colitis Sister        Review of Systems   Constitutional: Negative for activity change, appetite change, fatigue, fever and unexpected weight change.   HENT: Negative for trouble swallowing.    Respiratory: Negative for apnea, cough, choking, chest tightness, shortness of breath and wheezing.    Cardiovascular: Negative for chest pain, palpitations and leg swelling.   Gastrointestinal: Negative for abdominal distention, abdominal pain, anal bleeding, blood in stool, constipation, diarrhea, nausea, rectal pain and vomiting.       There were no vitals filed for this visit.    Physical Exam   Constitutional: He is oriented to person, place, and time.   Neurological: He is alert and oriented to person, place, and time.   Psychiatric: He has a normal mood and affect.       No radiology results for the last 7 days    Bernard was seen today for follow-up and ulcerative colitis.    Diagnoses and all orders for this visit:    Ulcerative colitis without complications, unspecified location (CMS/[facility])    Assessment/plan    Stable ulcerative colitis, continue current dosage of balsalazide.  I reviewed his last colonoscopy results as well as pathology with him.  Colonoscopy for surveillance purposes in 2021.  All questions were answered.  Return to clinic 6 months or sooner if needed.  Keep scheduled labs with PCP next month.  CBC, CMP, CRP, and sed rate at next office visit in 6 months.    (Telehealth visit/phone call 25 minutes duration)

## 2020-05-11 DIAGNOSIS — G47.00 INSOMNIA, UNSPECIFIED TYPE: ICD-10-CM

## 2020-05-11 RX ORDER — ZOLPIDEM TARTRATE 5 MG/1
TABLET ORAL
Qty: 90 TABLET | Refills: 0 | OUTPATIENT
Start: 2020-05-11

## 2020-05-12 NOTE — TELEPHONE ENCOUNTER
Patient scheduled an appointment for [place] first opening which is June 1st. Patient is almost out of this medication. Can you refill for him enough to get him through until his appointment?

## 2020-05-12 NOTE — TELEPHONE ENCOUNTER
Ambien script called into the pharmacy. Left message for pt informed him that ambien called into the pharmacy

## 2020-05-20 ENCOUNTER — TELEPHONE (OUTPATIENT)
Dept: GASTROENTEROLOGY | Facility: CLINIC | Age: 80
End: 2020-05-20

## 2020-05-20 NOTE — TELEPHONE ENCOUNTER
----- Message from Tio Callejas sent at 5/20/2020  8:32 AM EDT -----  Regarding: Update  Contact: [phone]  Patient states he recently left stool sample and was given a diagnosis and medication, which he has finished, but states he is still having problems. Says he is having a flare up of his Ulcerative Colitis. Patient would like a call back.

## 2020-05-20 NOTE — TELEPHONE ENCOUNTER
Returned patient's phone call. He states passing loose stools with mucus present and gas. He is having fecal leakage. States its seems his flare is not under control. Scheduled f/u appointment with Bibi for 5/21@1000. He verb understanding and is agreeable to the plan.

## 2020-05-21 ENCOUNTER — OFFICE VISIT (OUTPATIENT)
Dept: GASTROENTEROLOGY | Facility: CLINIC | Age: 80
End: 2020-05-21

## 2020-05-21 VITALS — TEMPERATURE: 97.1 F | BODY MASS INDEX: 43.43 KG/M2 | WEIGHT: 230 LBS | HEIGHT: 61 IN

## 2020-05-21 DIAGNOSIS — K51.90 ULCERATIVE COLITIS WITHOUT COMPLICATIONS, UNSPECIFIED LOCATION (HCC): Primary | ICD-10-CM

## 2020-05-21 DIAGNOSIS — R19.7 DIARRHEA, UNSPECIFIED TYPE: ICD-10-CM

## 2020-05-21 LAB
ALBUMIN SERPL-MCNC: 3.9 G/DL (ref 3.5–5.2)
ALBUMIN/GLOB SERPL: 1.8 G/DL
ALP SERPL-CCNC: 50 U/L (ref 39–117)
ALT SERPL-CCNC: 9 U/L (ref 1–41)
AST SERPL-CCNC: 18 U/L (ref 1–40)
BASOPHILS # BLD AUTO: 0.04 10*3/MM3 (ref 0–0.2)
BASOPHILS NFR BLD AUTO: 0.6 % (ref 0–1.5)
BILIRUB SERPL-MCNC: 0.5 MG/DL (ref 0.2–1.2)
BUN SERPL-MCNC: 24 MG/DL (ref 8–23)
BUN/CREAT SERPL: 19.8 (ref 7–25)
CALCIUM SERPL-MCNC: 8.9 MG/DL (ref 8.6–10.5)
CHLORIDE SERPL-SCNC: 108 MMOL/L (ref 98–107)
CO2 SERPL-SCNC: 26 MMOL/L (ref 22–29)
CREAT SERPL-MCNC: 1.21 MG/DL (ref 0.76–1.27)
CRP SERPL-MCNC: 0.57 MG/DL (ref 0–0.5)
EOSINOPHIL # BLD AUTO: 0.3 10*3/MM3 (ref 0–0.4)
EOSINOPHIL NFR BLD AUTO: 4.4 % (ref 0.3–6.2)
ERYTHROCYTE [DISTWIDTH] IN BLOOD BY AUTOMATED COUNT: 12.7 % (ref 12.3–15.4)
ERYTHROCYTE [SEDIMENTATION RATE] IN BLOOD BY WESTERGREN METHOD: 30 MM/HR (ref 0–20)
GLOBULIN SER CALC-MCNC: 2.2 GM/DL
GLUCOSE SERPL-MCNC: 93 MG/DL (ref 65–99)
HCT VFR BLD AUTO: 41.8 % (ref 37.5–51)
HGB BLD-MCNC: 14.2 G/DL (ref 13–17.7)
IMM GRANULOCYTES # BLD AUTO: 0.02 10*3/MM3 (ref 0–0.05)
IMM GRANULOCYTES NFR BLD AUTO: 0.3 % (ref 0–0.5)
LYMPHOCYTES # BLD AUTO: 1.79 10*3/MM3 (ref 0.7–3.1)
LYMPHOCYTES NFR BLD AUTO: 26.1 % (ref 19.6–45.3)
MCH RBC QN AUTO: 29.4 PG (ref 26.6–33)
MCHC RBC AUTO-ENTMCNC: 34 G/DL (ref 31.5–35.7)
MCV RBC AUTO: 86.5 FL (ref 79–97)
MONOCYTES # BLD AUTO: 0.65 10*3/MM3 (ref 0.1–0.9)
MONOCYTES NFR BLD AUTO: 9.5 % (ref 5–12)
NEUTROPHILS # BLD AUTO: 4.06 10*3/MM3 (ref 1.7–7)
NEUTROPHILS NFR BLD AUTO: 59.1 % (ref 42.7–76)
NRBC BLD AUTO-RTO: 0 /100 WBC (ref 0–0.2)
PLATELET # BLD AUTO: 199 10*3/MM3 (ref 140–450)
POTASSIUM SERPL-SCNC: 4.9 MMOL/L (ref 3.5–5.2)
PROT SERPL-MCNC: 6.1 G/DL (ref 6–8.5)
RBC # BLD AUTO: 4.83 10*6/MM3 (ref 4.14–5.8)
SODIUM SERPL-SCNC: 142 MMOL/L (ref 136–145)
TSH SERPL DL<=0.005 MIU/L-ACNC: 2.05 UIU/ML (ref 0.27–4.2)
WBC # BLD AUTO: 6.86 10*3/MM3 (ref 3.4–10.8)

## 2020-05-21 PROCEDURE — 99214 OFFICE O/P EST MOD 30 MIN: CPT | Performed by: NURSE PRACTITIONER

## 2020-05-21 RX ORDER — ZOLPIDEM TARTRATE 5 MG/1
5 TABLET ORAL NIGHTLY PRN
Qty: 90 TABLET | Refills: 0 | OUTPATIENT
Start: 2020-05-21 | End: 2020-08-04 | Stop reason: SDUPTHER

## 2020-05-21 NOTE — PROGRESS NOTES
Chief Complaint   Patient presents with   • Follow-up     Ulcerative colitis     HPI    Bernard Resendez is a  80 y.o. male here for a follow up visit for ulcerative colitis maintained on balsalazide.  Colitis greater than 30 years.  Patient also follows with Dr. Gifford.    Reviewed colonoscopy dated 11/19/2019:  Diverticulosis otherwise normal.  Repeat 2 years for surveillance purposes.  Path was benign.   Patient treated with budesonide in [facility] for flareup stopped in March.. Stool was negative for Cdiff.     Patient's been off of steroids for approximately 6 weeks.  Over the past several weeks he has had an increase in bowel frequency, urgency, and fecal incontinence.  Patient historically prone to fecal incontinence but worse during flareups of colitis.  Episodes of liquid stools over the past several days.  No rectal bleeding, abdominal pain, or weight loss.  In fact he is gained weight.  He continues on balsalazide.  He has been on this medication for several years.    No nausea, vomiting, acid reflux/heartburn, or dysphagia.  His appetite is good.      Past Medical History:   Diagnosis Date   • Acute renal failure (CMS/HCC)    • Diverticulosis    • Hearing difficulty    • History of shingles    • Hyperlipidemia    • Hypertension    • Hypogonadism in male    • Insomnia    • Nephrolithiasis    • Osteopenia    • Osteopenia    • Renal insufficiency    • Ulcerative colitis (CMS/HCC)        Past Surgical History:   Procedure Laterality Date   • COLONOSCOPY  06/11/2015    NBIH, actuve colitis w/cryptitis, rare crypt abscess & superficial erosion, hyperplastic polyp   • COLONOSCOPY N/A 8/8/2017    IH, nodular mucosa in the proximal rectum and in the distal rectum, Diverticulosis in the sigmoid colon, the descending colon and at the splenic flexure.  PATH: Hyperplastic polyps    • COLONOSCOPY N/A 11/19/2019    Procedure: COLONOSCOPY to cecum with biopsies;  Surgeon: Calvin Gifford MD;  Location: [facility] ENDOSCOPY;   Service: Gastroenterology   • EYE SURGERY      cataract   • KIDNEY STONE SURGERY     • TUMOR EXCISION      rectal tumor       Scheduled Meds:  Outpatient Encounter Medications as of 2020   Medication Sig Dispense Refill   • atenolol (TENORMIN) 50 MG tablet Take 1 tablet by mouth Daily. 90 tablet 3   • balsalazide (COLAZAL) 750 MG capsule Take 3 capsules by mouth 3 (Three) Times a Day. 270 capsule 11   • Cholecalciferol (VITAMIN D3) 1000 UNITS capsule Take by mouth daily.     • lisinopril (PRINIVIL,ZESTRIL) 40 MG tablet Take 1 tablet by mouth Daily. 90 tablet 3   • melatonin 5 MG tablet tablet Take 5 mg by mouth Every Night.     • Multiple Vitamins-Minerals (CENTRUM SILVER ADULT 50+ PO) Take by mouth.     • Omega-3 Fatty Acids (FISH OIL) 1200 MG capsule capsule Take by mouth.     • vitamin C (ASCORBIC ACID) 500 MG tablet Take 1,000 mg by mouth Daily.     • zolpidem (AMBIEN) 5 MG tablet Take 1 tablet by mouth At Night As Needed for Sleep. 90 tablet 0     No facility-administered encounter medications on file as of 2020.        Continuous Infusions:  No current facility-administered medications for this visit.     PRN Meds:.    Allergies   Allergen Reactions   • Ciprofloxacin Unknown (See Comments)     Pt unsure reaction       Social History     Socioeconomic History   • Marital status:      Spouse name: Not on file   • Number of children: Not on file   • Years of education: Not on file   • Highest education level: Not on file   Tobacco Use   • Smoking status: Former Smoker     Packs/day: 0.25     Types: Cigarettes     Last attempt to quit: 1981     Years since quittin.4   • Smokeless tobacco: Former User   Substance and Sexual Activity   • Alcohol use: Yes     Alcohol/week: 2.0 standard drinks     Types: 2 Shots of liquor per week     Comment: socially   • Drug use: No   • Sexual activity: Defer       Family History   Problem Relation Age of Onset   • Hypertension Mother    • Stroke Mother    •  Asthma Sister    • Ulcerative colitis Sister        Review of Systems   Constitutional: Negative for activity change, appetite change, fatigue, fever and unexpected weight change.   HENT: Negative for trouble swallowing.    Respiratory: Negative for apnea, cough, choking, chest tightness, shortness of breath and wheezing.    Cardiovascular: Negative for chest pain, palpitations and leg swelling.   Gastrointestinal: Positive for diarrhea. Negative for abdominal distention, abdominal pain, anal bleeding, blood in stool, constipation, nausea, rectal pain and vomiting.       Vitals:    05/21/20 0931   Temp: 97.1 °F (36.2 °C)       Physical Exam   Constitutional: He is oriented to person, place, and time. He appears well-developed and well-nourished.   Eyes: Pupils are equal, round, and reactive to light.   Cardiovascular: Normal rate, regular rhythm and normal heart sounds.   Pulmonary/Chest: Effort normal and breath sounds normal. No respiratory distress. He has no wheezes.   Abdominal: Soft. Bowel sounds are normal. He exhibits no distension and no mass. There is no tenderness. There is no guarding. No hernia.   Musculoskeletal: Normal range of motion.   Neurological: He is alert and oriented to person, place, and time.   Skin: Skin is warm and dry. Capillary refill takes less than 2 seconds.   Psychiatric: He has a normal mood and affect. His behavior is normal.       No radiology results for the last 7 days    Bernard was seen today for follow-up.    Diagnoses and all orders for this visit:    Ulcerative colitis without complications, unspecified location (CMS/[facility])  -     CBC & Differential  -     Comprehensive Metabolic Panel  -     C-reactive Protein  -     Sedimentation Rate  -     TSH  -     Calprotectin, Fecal - Stool, Per Rectum  -     Clostridium Difficile Toxin, PCR - Stool, Per Rectum  -     Gastrointestinal Panel, PCR - Stool, Per Rectum    Diarrhea, unspecified type   -     TSH  -     Gastrointestinal Panel,  PCR - Stool, Per Rectum      Impression:    Pleasant 80-year-old male with history of ulcerative colitis seen today for concerns of possible flareup. Patient's been off of prednisone for almost 6 weeks.  Onset of symptoms over the past several weeks.  Episodes of diarrhea, fecal urgency, and increased fecal incontinence.  Moving forward obtain stool testing to again rule out infectious pathogens and assess for inflammation.  CBC  CMP  CRP and sed rate  Check TSH.    Depending on the aforementioned work-up we may need to consider alternative medication to manage ulcerative colitis.  Consider resuming steroids.    Further recommendations and follow-up pending the aforementioned work-up.

## 2020-05-22 NOTE — PROGRESS NOTES
This gentleman has ulcerative colitis.  He has been on balsalazide but had to have a course of prednisone this year.  I saw him recently for possible return of symptoms.  Slight elevation of inflammatory markers.  He has poor rectal tone and cannot tolerate topical preparations.  Do you feel like we need to change to something other than balsalazide?  Of course I am still waiting on stool testing to rule out infectious pathogens and fecal calprotectin.  Your thoughts?

## 2020-05-22 NOTE — PROGRESS NOTES
Please notify Bernard that lab work shows no evidence of anemia.  Normal liver function.  Normal thyroid function.  Very slight elevation of inflammatory markers on blood work.  Please complete stool testing at which point we can make recommendations moving forward.

## 2020-05-23 LAB — C DIFF TOX GENS STL QL NAA+PROBE: NEGATIVE

## 2020-05-26 ENCOUNTER — TELEPHONE (OUTPATIENT)
Dept: GASTROENTEROLOGY | Facility: CLINIC | Age: 80
End: 2020-05-26

## 2020-05-26 NOTE — TELEPHONE ENCOUNTER
Returned patient's phone call. Advised as per Bibi's note. He states he continues to have fecal incontinence and states he has left over Prednisone. Questions if he can take it? Advised not to take any Prednisone. Advised once we have all his stool tests back will call with recommendations. He verb understanding.

## 2020-05-26 NOTE — TELEPHONE ENCOUNTER
----- Message from Tio Gambino sent at 5/26/2020  9:44 AM EDT -----  Regarding: update  Contact: [phone]  Pt is calling because he is still having a little bit of blood in his stool.

## 2020-05-26 NOTE — TELEPHONE ENCOUNTER
Notes recorded by Bibi Griffith APRN on 5/26/2020 at 12:50 PM EDT  Please inform the patient that stool is negative for C. difficile.  Still need fecal calprotectin and GI PCR.

## 2020-05-26 NOTE — PROGRESS NOTES
Please inform the patient that stool is negative for C. difficile.  Still need fecal calprotectin and GI PCR.

## 2020-05-26 NOTE — TELEPHONE ENCOUNTER
Notes recorded by Bibi Griffith APRN on 5/22/2020 at 12:19 PM EDT  Please notify Bernard that lab work shows no evidence of anemia.  Normal liver function.  Normal thyroid function.  Very slight elevation of inflammatory markers on blood work.  Please complete stool testing at which point we can make recommendations moving forward.  ------

## 2020-05-27 LAB
ADV 40+41 DNA STL QL NAA+NON-PROBE: NOT DETECTED
ASTRO TYP 1-8 RNA STL QL NAA+NON-PROBE: NOT DETECTED
C CAYETANENSIS DNA STL QL NAA+NON-PROBE: NOT DETECTED
C COLI+JEJ+UPSA DNA STL QL NAA+NON-PROBE: NOT DETECTED
C DIF TOX TCDA+TCDB STL QL NAA+NON-PROBE: NOT DETECTED
CRYPTOSP DNA STL QL NAA+NON-PROBE: NOT DETECTED
E COLI O157 DNA STL QL NAA+NON-PROBE: NORMAL
E HISTOLYT DNA STL QL NAA+NON-PROBE: NOT DETECTED
EAEC PAA PLAS AGGR+AATA ST NAA+NON-PRB: NOT DETECTED
EC STX1+STX2 GENES STL QL NAA+NON-PROBE: NOT DETECTED
EPEC EAE GENE STL QL NAA+NON-PROBE: NOT DETECTED
ETEC LTA+ST1A+ST1B TOX ST NAA+NON-PROBE: NOT DETECTED
G LAMBLIA DNA STL QL NAA+NON-PROBE: NOT DETECTED
NOROVIRUS GI+II RNA STL QL NAA+NON-PROBE: NOT DETECTED
P SHIGELLOIDES DNA STL QL NAA+NON-PROBE: NOT DETECTED
RVA RNA STL QL NAA+NON-PROBE: NOT DETECTED
S ENT+BONG DNA STL QL NAA+NON-PROBE: NOT DETECTED
SAPO I+II+IV+V RNA STL QL NAA+NON-PROBE: NOT DETECTED
SHIGELLA SP+EIEC IPAH ST NAA+NON-PROBE: NOT DETECTED
V CHOL+PARA+VUL DNA STL QL NAA+NON-PROBE: NOT DETECTED
V CHOLERAE DNA STL QL NAA+NON-PROBE: NOT DETECTED
Y ENTEROCOL DNA STL QL NAA+NON-PROBE: NOT DETECTED

## 2020-05-27 NOTE — TELEPHONE ENCOUNTER
GI PCR is negative.  I do not see that he turned in the fecal calprotectin.  I would not take left over prednisone at this moment.  Has he turned in fecal calprotectin?

## 2020-05-29 ENCOUNTER — TELEPHONE (OUTPATIENT)
Dept: GASTROENTEROLOGY | Facility: CLINIC | Age: 80
End: 2020-05-29

## 2020-05-29 RX ORDER — BUDESONIDE 3 MG/1
9 CAPSULE, COATED PELLETS ORAL EVERY MORNING
Qty: 90 CAPSULE | Refills: 1 | Status: SHIPPED | OUTPATIENT
Start: 2020-05-29 | End: 2020-06-15

## 2020-05-29 NOTE — PROGRESS NOTES
Please inform the patient that we are going to go ahead and start him on budesonide which is a gut specific steroid to treat his current issues.  Dr. Gifford would like to maximize balsalazide dosage which I think the patient is currently doing.  Can we verify that he is taking 3 tablets 3 times a day?  Let me know what dosage he is taking.  I will E scribe budesonide.  Lets have him follow-up in 2 weeks to monitor for symptom improvement.

## 2020-05-29 NOTE — TELEPHONE ENCOUNTER
----- Message from Tio Gambino sent at 5/29/2020  1:26 PM EDT -----  Regarding: talk to Ya  Contact: [phone]  Pt is calling regarding his diagnosis. He is wanting to talk to Ya.

## 2020-06-01 ENCOUNTER — OFFICE VISIT (OUTPATIENT)
Dept: FAMILY MEDICINE CLINIC | Facility: CLINIC | Age: 80
End: 2020-06-01

## 2020-06-01 VITALS
WEIGHT: 229 LBS | HEART RATE: 62 BPM | HEIGHT: 71 IN | OXYGEN SATURATION: 99 % | DIASTOLIC BLOOD PRESSURE: 82 MMHG | SYSTOLIC BLOOD PRESSURE: 176 MMHG | BODY MASS INDEX: 32.06 KG/M2

## 2020-06-01 DIAGNOSIS — R25.2 MUSCLE CRAMPS: Primary | ICD-10-CM

## 2020-06-01 DIAGNOSIS — G47.00 INSOMNIA, UNSPECIFIED TYPE: ICD-10-CM

## 2020-06-01 PROCEDURE — 99213 OFFICE O/P EST LOW 20 MIN: CPT | Performed by: FAMILY MEDICINE

## 2020-06-01 RX ORDER — CLOTRIMAZOLE 1 %
CREAM (GRAM) TOPICAL
COMMUNITY
Start: 2020-04-29 | End: 2020-08-10

## 2020-06-01 RX ORDER — BACLOFEN 10 MG/1
TABLET ORAL
COMMUNITY
Start: 2020-02-27 | End: 2020-06-01 | Stop reason: SDUPTHER

## 2020-06-01 RX ORDER — BACLOFEN 10 MG/1
10 TABLET ORAL 2 TIMES DAILY
Qty: 90 TABLET | Refills: 1 | Status: SHIPPED | OUTPATIENT
Start: 2020-06-01 | End: 2020-06-15

## 2020-06-01 RX ORDER — SODIUM FLUORIDE 5 MG/G
PASTE, DENTIFRICE ORAL
COMMUNITY
Start: 2020-04-13 | End: 2021-01-15

## 2020-06-01 NOTE — PROGRESS NOTES
Bernard Resendez is a 80 y.o. male.     Chief Complaint   Patient presents with   • Insomnia     Patient needed to be seen to continue to get his 3 month supply of Ambien        HPI     Patient here to follow-up on insomnia as well as an issue that is new to me.  Patient gets muscle cramps from time to time.  Has used baclofen in the past with good results.  Is requesting a refill.  Patient recently called into the office to get a refill for Ambien 5 mg for insomnia.  Tolerating well.    The following portions of the patient's history were reviewed and updated as appropriate: allergies, current medications, past family history, past medical history, past social history, past surgical history and problem list.    Review of Systems   Cardiovascular: Negative for chest pain, palpitations and leg swelling.   All other systems reviewed and are negative.    I have reviewed the ROS as documented by the MA. Laci Mcnair MD    Objective  Vitals:    06/01/20 1459   BP: 176/82   Pulse: 62   SpO2: 99%     Body mass index is 31.95 kg/m².    Physical Exam   Constitutional: He is oriented to person, place, and time. He appears well-developed and well-nourished. No distress.   Neurological: He is alert and oriented to person, place, and time.   Skin: He is not diaphoretic.   Psychiatric: He has a normal mood and affect. His behavior is normal.   Nursing note and vitals reviewed.        Current Outpatient Medications:   •  atenolol (TENORMIN) 50 MG tablet, Take 1 tablet by mouth Daily., Disp: 90 tablet, Rfl: 3  •  baclofen (LIORESAL) 10 MG tablet, Take 1 tablet by mouth 2 (Two) Times a Day., Disp: 90 tablet, Rfl: 1  •  balsalazide (COLAZAL) 750 MG capsule, Take 3 capsules by mouth 3 (Three) Times a Day., Disp: 270 capsule, Rfl: 11  •  Budesonide (ENTOCORT EC) 3 MG 24 hr capsule, Take 3 capsules by mouth Every Morning., Disp: 90 capsule, Rfl: 1  •  Cholecalciferol (VITAMIN D3) 1000 UNITS capsule, Take by mouth daily., Disp: , Rfl:    •  CLINPRO 5000 1.1 % paste, BRUSH AS DIRECTED BY DENTIST, Disp: , Rfl:   •  clotrimazole (LOTRIMIN) 1 % cream, , Disp: , Rfl:   •  lisinopril (PRINIVIL,ZESTRIL) 40 MG tablet, Take 1 tablet by mouth Daily., Disp: 90 tablet, Rfl: 3  •  melatonin 5 MG tablet tablet, Take 5 mg by mouth Every Night., Disp: , Rfl:   •  Multiple Vitamins-Minerals (CENTRUM SILVER ADULT 50+ PO), Take by mouth., Disp: , Rfl:   •  mupirocin (BACTROBAN) 2 % ointment, , Disp: , Rfl:   •  Omega-3 Fatty Acids (FISH OIL) 1200 MG capsule capsule, Take by mouth., Disp: , Rfl:   •  vitamin C (ASCORBIC ACID) 500 MG tablet, Take 1,000 mg by mouth Daily., Disp: , Rfl:   •  zolpidem (AMBIEN) 5 MG tablet, Take 1 tablet by mouth At Night As Needed for Sleep., Disp: 90 tablet, Rfl: 0  Current outpatient and discharge medications have been reconciled for the patient.  Reviewed by: Laci Mcnair MD      Procedures    Lab Results (most recent)     None                  Bernard was seen today for insomnia.    Diagnoses and all orders for this visit:    Muscle cramps  -     baclofen (LIORESAL) 10 MG tablet; Take 1 tablet by mouth 2 (Two) Times a Day.    Insomnia, unspecified type      Refill for baclofen as above.  Refill already called in for Ambien.    Return for As needed.      Laci Mcnair MD

## 2020-06-04 LAB — LACTOFERRIN STL-MCNC: 8.52 UG/ML(G) (ref 0–7.24)

## 2020-06-05 LAB — SPECIMEN STATUS: NORMAL

## 2020-06-09 ENCOUNTER — TELEPHONE (OUTPATIENT)
Dept: GASTROENTEROLOGY | Facility: CLINIC | Age: 80
End: 2020-06-09

## 2020-06-09 NOTE — TELEPHONE ENCOUNTER
Jared Zavala RegSched Rep  P k [facility] [address] [place]   Phone Number: [phone]             Pt is calling wanting to talk to Ya about his medication.

## 2020-06-09 NOTE — TELEPHONE ENCOUNTER
Returned patient's phone call. He states he is still having loose stools and fecal incontinence despite taking  Budesonide for two weeks. When questioned if he taking Imodium along with it, he states no. Advised to purchase the medication OTC, follow label instructions and call back tomorrow. He verb understanding. Update sent to Emely COLLADO.

## 2020-06-09 NOTE — TELEPHONE ENCOUNTER
----- Message from ANTELMO Mccabe sent at 6/5/2020  4:58 PM EDT -----  Please call the patient and let him know his fecal lactoferrin was elevated at 8.52.  This would signify inflammation in his small bowel.  How is he doing on the budesonide and the Imodium?

## 2020-06-10 ENCOUNTER — TELEPHONE (OUTPATIENT)
Dept: GASTROENTEROLOGY | Facility: CLINIC | Age: 80
End: 2020-06-10

## 2020-06-10 NOTE — TELEPHONE ENCOUNTER
----- Message from Tio Gambino sent at 6/10/2020  8:57 AM EDT -----  Regarding: med  Contact: [phone]  Pt is calling back and wants to talk to Ya about his medication.

## 2020-06-10 NOTE — TELEPHONE ENCOUNTER
Returned patient's phone call. He states he started taking Imodium yesterday and has taken 4 doses. He states it has helped some. Advised to continue taking Imodium. He questions when the Budesonide will kick in. He states he is concerned if its ever going to work. Advised sometimes it takes more than 2 weeks for a medication to work. Advised will send an update to Dr. Gifford.   Patient is to call next week with a health update.

## 2020-06-15 ENCOUNTER — OFFICE VISIT (OUTPATIENT)
Dept: GASTROENTEROLOGY | Facility: CLINIC | Age: 80
End: 2020-06-15

## 2020-06-15 VITALS — WEIGHT: 224 LBS | TEMPERATURE: 98 F | HEIGHT: 71 IN | BODY MASS INDEX: 31.36 KG/M2

## 2020-06-15 DIAGNOSIS — K51.90 ULCERATIVE COLITIS WITHOUT COMPLICATIONS, UNSPECIFIED LOCATION (HCC): Primary | ICD-10-CM

## 2020-06-15 DIAGNOSIS — K51.311 ULCERATIVE RECTOSIGMOIDITIS WITH RECTAL BLEEDING (HCC): ICD-10-CM

## 2020-06-15 DIAGNOSIS — R19.7 DIARRHEA, UNSPECIFIED TYPE: ICD-10-CM

## 2020-06-15 PROCEDURE — 99213 OFFICE O/P EST LOW 20 MIN: CPT | Performed by: INTERNAL MEDICINE

## 2020-06-15 RX ORDER — MESALAMINE 1.2 G/1
1200 TABLET, DELAYED RELEASE ORAL 4 TIMES DAILY
Qty: 120 TABLET | Refills: 12 | Status: SHIPPED | OUTPATIENT
Start: 2020-06-15 | End: 2020-07-13 | Stop reason: SDUPTHER

## 2020-06-15 RX ORDER — PREDNISONE 10 MG/1
10 TABLET ORAL DAILY
Qty: 70 TABLET | Refills: 1 | Status: SHIPPED | OUTPATIENT
Start: 2020-06-15 | End: 2020-08-04

## 2020-06-15 NOTE — PROGRESS NOTES
Chief Complaint   Patient presents with   • Ulcerative Colitis   • Diarrhea       Bernard Resendez is a  80 y.o. male here for a follow up visit for ulcerative colitis left side    Ulcerative colitis greater than 20 years always left-sided, colonoscopy last year showed remission  8 weeks now of diarrhea abdominal pain rectal bleeding with a flare  Sed rate CRP elevated  Fecal lactoferrin positive  GI PCR with C. 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 and he is not able to do suppositories or enemas as he is not able to hold them      Past Medical History:   Diagnosis Date   • Acute renal failure (CMS/HCC)    • Diverticulosis    • Hearing difficulty    • History of shingles    • Hyperlipidemia    • Hypertension    • Hypogonadism in male    • Insomnia    • Nephrolithiasis    • Osteopenia    • Osteopenia    • Renal insufficiency    • Ulcerative colitis (CMS/HCC)        Past Surgical History:   Procedure Laterality Date   • COLONOSCOPY  06/11/2015    NBIH, actuve colitis w/cryptitis, rare crypt abscess & superficial erosion, hyperplastic polyp   • COLONOSCOPY N/A 8/8/2017    IH, nodular mucosa in the proximal rectum and in the distal rectum, Diverticulosis in the sigmoid colon, the descending colon and at the splenic flexure.  PATH: Hyperplastic polyps    • COLONOSCOPY N/A 11/19/2019    Procedure: COLONOSCOPY to cecum with biopsies;  Surgeon: Calvin Gifford MD;  Location: [facility] ENDOSCOPY;  Service: Gastroenterology   • EYE SURGERY      cataract   • KIDNEY STONE SURGERY     • TUMOR EXCISION      rectal tumor       Scheduled Meds:    Continuous Infusions:  No current facility-administered medications for this visit.     PRN Meds:.    Allergies   Allergen Reactions   • Ciprofloxacin  Unknown (See Comments)     Pt unsure reaction       Social History     Socioeconomic History   • Marital status:      Spouse name: Not on file   • Number of children: Not on file   • Years of education: Not on file   • Highest education level: Not on file   Tobacco Use   • Smoking status: Former Smoker     Packs/day: 0.25     Types: Cigarettes     Last attempt to quit:      Years since quittin.4   • Smokeless tobacco: Former User   Substance and Sexual Activity   • Alcohol use: Yes     Alcohol/week: 2.0 standard drinks     Types: 2 Shots of liquor per week     Comment: socially   • Drug use: No   • Sexual activity: Defer       Family History   Problem Relation Age of Onset   • Hypertension Mother    • Stroke Mother    • Asthma Sister    • Ulcerative colitis Sister        Review of Systems   Gastrointestinal: Positive for abdominal distention, abdominal pain, anal bleeding, blood in stool and diarrhea.   All other systems reviewed and are negative.      Vitals:    06/15/20 1525   Temp: 98 °F (36.7 °C)       Physical Exam   Constitutional: He is oriented to person, place, and time. He appears well-developed and well-nourished.   HENT:   Head: Normocephalic and atraumatic.   Eyes: Conjunctivae and EOM are normal.   Neck: Normal range of motion. No tracheal deviation present.   Cardiovascular: Normal rate and regular rhythm.   Pulmonary/Chest: Effort normal and breath sounds normal. No respiratory distress.   Abdominal: Soft. Bowel sounds are normal. He exhibits no distension and no mass. There is no tenderness. There is no rebound and no guarding.   Musculoskeletal: Normal range of motion.   Neurological: He is alert and oriented to person, place, and time.   Skin: Skin is warm and dry.   Psychiatric: He has a normal mood and affect. Judgment normal.   Nursing note and vitals reviewed.      Problem list    Ulcerative colitis flare  GI PCR with C. difficile negative  He has failed balsalazide and  budesonide      Assessment/Plan    Prednisone 4-week taper (short taper in hopes of getting him off quickly as we are in the middle of a pandemic and I do not want to immunosuppress him for too long)  Discontinue balsalazide discontinue budesonide  Start mesalamine 4.8 g/day  Office visit 4 weeks if he is still not in remission plan for flexible sigmoidoscopy with intention to step up therapy if he truly has a severe flare occurring, Entyvio would be a good choice

## 2020-07-13 ENCOUNTER — OFFICE VISIT (OUTPATIENT)
Dept: GASTROENTEROLOGY | Facility: CLINIC | Age: 80
End: 2020-07-13

## 2020-07-13 VITALS
TEMPERATURE: 98 F | WEIGHT: 230.8 LBS | BODY MASS INDEX: 32.31 KG/M2 | DIASTOLIC BLOOD PRESSURE: 74 MMHG | HEIGHT: 71 IN | SYSTOLIC BLOOD PRESSURE: 140 MMHG

## 2020-07-13 DIAGNOSIS — K51.90 ULCERATIVE COLITIS WITHOUT COMPLICATIONS, UNSPECIFIED LOCATION (HCC): Primary | ICD-10-CM

## 2020-07-13 PROCEDURE — 99213 OFFICE O/P EST LOW 20 MIN: CPT | Performed by: NURSE PRACTITIONER

## 2020-07-13 RX ORDER — MESALAMINE 1.2 G/1
4800 TABLET, DELAYED RELEASE ORAL DAILY
Qty: 120 TABLET | Refills: 12 | Status: SHIPPED | OUTPATIENT
Start: 2020-07-13 | End: 2020-11-30

## 2020-07-13 NOTE — PROGRESS NOTES
Chief Complaint   Patient presents with   • GI Problem     uc     HPI    Bernard Resendez is a  80 y.o. male here for a follow up visit for left-sided ulcerative colitis diagnosed greater than 20 years ago always left-sided, colonoscopy last year showed remission.  Follows with Dr. Gifford known to me.  Now with greater than 2 months of diarrhea, abdominal pain, rectal bleeding with flare.  Sed rate and CRP elevated.  Fecal lactoferrin positive.  Stool testing was negative.  Patient was on max doses of balsalazide failed and stopped and max doses of Entocort failed and stopped.  He has a history of anal fistula or fissure in the past and is not able to do suppositories or enemas.  Patient started on 4-week prednisone taper during last office visit in June.  He was started on mesalamine 4.8 g/day.    On visit today he has 1 week left on his prednisone taper.  He is taking mesalamine 4.8 g/day.  He has had resolution of rectal bleeding and fecal incontinence.  He is averaging 2 episodes of diarrhea a week which is much improved.  Otherwise his bowels are formed averaging up to 3 bowel movements a day.  No nocturnal symptoms.  No rectal pain or abdominal pain.    His appetite is fantastic.  He is had a 7 pound weight gain with prednisone taper.  No nausea or vomiting.  No dysphagia.    Past Medical History:   Diagnosis Date   • Acute renal failure (CMS/HCC)    • Diverticulosis    • Hearing difficulty    • History of shingles    • Hyperlipidemia    • Hypertension    • Hypogonadism in male    • Insomnia    • Nephrolithiasis    • Osteopenia    • Osteopenia    • Renal insufficiency    • Ulcerative colitis (CMS/HCC)        Past Surgical History:   Procedure Laterality Date   • COLONOSCOPY  06/11/2015    NBIH, actuve colitis w/cryptitis, rare crypt abscess & superficial erosion, hyperplastic polyp   • COLONOSCOPY N/A 8/8/2017    IH, nodular mucosa in the proximal rectum and in the distal rectum, Diverticulosis in the sigmoid  colon, the descending colon and at the splenic flexure.  PATH: Hyperplastic polyps    • COLONOSCOPY N/A 11/19/2019    Procedure: COLONOSCOPY to cecum with biopsies;  Surgeon: Calvin Gifford MD;  Location: [facility] ENDOSCOPY;  Service: Gastroenterology   • EYE SURGERY      cataract   • KIDNEY STONE SURGERY     • TUMOR EXCISION      rectal tumor       Scheduled Meds:  Outpatient Encounter Medications as of 7/13/2020   Medication Sig Dispense Refill   • atenolol (TENORMIN) 50 MG tablet Take 1 tablet by mouth Daily. 90 tablet 3   • Cholecalciferol (VITAMIN D3) 1000 UNITS capsule Take by mouth daily.     • CLINPRO 5000 1.1 % paste BRUSH AS DIRECTED BY DENTIST     • clotrimazole (LOTRIMIN) 1 % cream      • lisinopril (PRINIVIL,ZESTRIL) 40 MG tablet Take 1 tablet by mouth Daily. 90 tablet 3   • melatonin 5 MG tablet tablet Take 5 mg by mouth Every Night.     • mesalamine (Lialda) 1.2 g EC tablet Take 4 tablets by mouth Daily. 120 tablet 12   • Multiple Vitamins-Minerals (CENTRUM SILVER ADULT 50+ PO) Take by mouth.     • mupirocin (BACTROBAN) 2 % ointment      • Omega-3 Fatty Acids (FISH OIL) 1200 MG capsule capsule Take by mouth.     • predniSONE (DELTASONE) 10 MG tablet Take 1 tablet by mouth Daily. Taper as instructed 40, 30,20, 10, stop 70 tablet 1   • vitamin C (ASCORBIC ACID) 500 MG tablet Take 1,000 mg by mouth Daily.     • zolpidem (AMBIEN) 5 MG tablet Take 1 tablet by mouth At Night As Needed for Sleep. 90 tablet 0   • [DISCONTINUED] mesalamine (Lialda) 1.2 g EC tablet Take 1 tablet by mouth 4 (Four) Times a Day. Generic mesalamine ok, 4.8 grams per 24 hour period 120 tablet 12     No facility-administered encounter medications on file as of 7/13/2020.        Continuous Infusions:  No current facility-administered medications for this visit.     PRN Meds:.    Allergies   Allergen Reactions   • Ciprofloxacin Unknown (See Comments)     Pt unsure reaction       Social History     Socioeconomic History   • Marital  status:      Spouse name: Not on file   • Number of children: Not on file   • Years of education: Not on file   • Highest education level: Not on file   Tobacco Use   • Smoking status: Former Smoker     Packs/day: 0.25     Types: Cigarettes     Last attempt to quit:      Years since quittin.5   • Smokeless tobacco: Former User   Substance and Sexual Activity   • Alcohol use: Yes     Alcohol/week: 2.0 standard drinks     Types: 2 Shots of liquor per week     Comment: socially   • Drug use: No   • Sexual activity: Defer       Family History   Problem Relation Age of Onset   • Hypertension Mother    • Stroke Mother    • Asthma Sister    • Ulcerative colitis Sister        Review of Systems   Constitutional: Negative for activity change, appetite change, fatigue, fever and unexpected weight change.   HENT: Negative for trouble swallowing.    Respiratory: Negative for apnea, cough, choking, chest tightness, shortness of breath and wheezing.    Cardiovascular: Negative for chest pain, palpitations and leg swelling.   Gastrointestinal: Positive for diarrhea. Negative for abdominal distention, abdominal pain, anal bleeding, blood in stool, constipation, nausea, rectal pain and vomiting.       Vitals:    20 1437   BP: 140/74   Temp: 98 °F (36.7 °C)       Physical Exam   Constitutional: He is oriented to person, place, and time. He appears well-developed and well-nourished.   Eyes: Pupils are equal, round, and reactive to light.   Cardiovascular: Normal rate, regular rhythm and normal heart sounds.   Pulmonary/Chest: Effort normal and breath sounds normal. No respiratory distress. He has no wheezes.   Abdominal: Soft. Bowel sounds are normal. He exhibits no distension and no mass. There is no tenderness. There is no guarding. No hernia.   Musculoskeletal: Normal range of motion.   Neurological: He is alert and oriented to person, place, and time.   Skin: Skin is warm and dry. Capillary refill takes less  than 2 seconds.   Psychiatric: He has a normal mood and affect. His behavior is normal.       No radiology results for the last 7 days    Bernard was seen today for gi problem.    Diagnoses and all orders for this visit:    Ulcerative colitis without complications, unspecified location (CMS/HCC)    Other orders  -     mesalamine (Lialda) 1.2 g EC tablet; Take 4 tablets by mouth Daily.      Assessment/plan    Pleasant 80-year-old male seen today in follow-up doing quite well after short course of prednisone to treat symptoms of a flareup that have been ongoing for several months.  He has 1 week left on prednisone taper.  He is taking Lialda 4 tablets once daily.  He has had resolution of fecal incontinence, rectal bleeding, and abdominal pain.  Still having around 2 episodes of diarrhea a week which is much improved.  Moving forward recommend he complete prednisone taper as previously prescribed.  Continue Lialda 4 tablets once daily.  Return to clinic 4 weeks to monitor for continued symptom improvement.  If symptoms return off of prednisone taper consider sigmoidoscopy with biopsy with the intention to step up therapy to possibly Entyvio.  Patient to call with any questions or concerns in the interim.

## 2020-07-21 ENCOUNTER — TELEPHONE (OUTPATIENT)
Dept: GASTROENTEROLOGY | Facility: CLINIC | Age: 80
End: 2020-07-21

## 2020-07-21 RX ORDER — SULFASALAZINE 500 MG/1
1000 TABLET ORAL 3 TIMES DAILY
Qty: 180 TABLET | Refills: 11 | Status: SHIPPED | OUTPATIENT
Start: 2020-07-21 | End: 2021-08-20

## 2020-07-21 NOTE — TELEPHONE ENCOUNTER
Patient called, advised as per Dr. Gifford's note. He verb understanding and is agreeable to the plan. Advised to call back if his symptoms don't improve or worsen.

## 2020-07-21 NOTE — TELEPHONE ENCOUNTER
Per Dr. Gifford: We could do sulfasalazine unless   sulfur allergic?  If not 500 mg,  2 p.o. 3 times daily, #120

## 2020-07-21 NOTE — TELEPHONE ENCOUNTER
Returned patient's phone call. He states since he started Mesalamine, he has developed lower abdominal cramping, gas and has several diarrhea stools through out the day. Patient states he stopped taking Mesalamine today.   Questions if there is something else he can take? Advised will send an update to Dr. Gifford. He verb understanding and is agreeable to the plan.

## 2020-07-21 NOTE — TELEPHONE ENCOUNTER
----- Message from Tio Gambino sent at 7/21/2020  8:47 AM EDT -----  Regarding: meds  Contact: [phone]  Pt called and said he is having a lot of trouble with his medication and wants to talk to nurse.

## 2020-08-04 ENCOUNTER — OFFICE VISIT (OUTPATIENT)
Dept: FAMILY MEDICINE CLINIC | Facility: CLINIC | Age: 80
End: 2020-08-04

## 2020-08-04 VITALS
OXYGEN SATURATION: 94 % | TEMPERATURE: 98.9 F | WEIGHT: 226.4 LBS | DIASTOLIC BLOOD PRESSURE: 62 MMHG | BODY MASS INDEX: 31.69 KG/M2 | HEIGHT: 71 IN | SYSTOLIC BLOOD PRESSURE: 136 MMHG | HEART RATE: 81 BPM

## 2020-08-04 DIAGNOSIS — R97.20 ELEVATED PSA: ICD-10-CM

## 2020-08-04 DIAGNOSIS — K51.311 ULCERATIVE RECTOSIGMOIDITIS WITH RECTAL BLEEDING (HCC): ICD-10-CM

## 2020-08-04 DIAGNOSIS — R73.09 ELEVATED GLUCOSE: ICD-10-CM

## 2020-08-04 DIAGNOSIS — G47.00 INSOMNIA, UNSPECIFIED TYPE: ICD-10-CM

## 2020-08-04 DIAGNOSIS — Z00.00 MEDICARE ANNUAL WELLNESS VISIT, SUBSEQUENT: Primary | ICD-10-CM

## 2020-08-04 DIAGNOSIS — E78.5 HYPERLIPIDEMIA, UNSPECIFIED HYPERLIPIDEMIA TYPE: ICD-10-CM

## 2020-08-04 DIAGNOSIS — I10 ESSENTIAL HYPERTENSION: ICD-10-CM

## 2020-08-04 PROCEDURE — G0439 PPPS, SUBSEQ VISIT: HCPCS | Performed by: FAMILY MEDICINE

## 2020-08-04 PROCEDURE — 99214 OFFICE O/P EST MOD 30 MIN: CPT | Performed by: FAMILY MEDICINE

## 2020-08-04 RX ORDER — ZOLPIDEM TARTRATE 5 MG/1
5 TABLET ORAL NIGHTLY PRN
Qty: 90 TABLET | Refills: 0 | Status: SHIPPED | OUTPATIENT
Start: 2020-08-04 | End: 2020-09-15

## 2020-08-04 RX ORDER — DOXEPIN HYDROCHLORIDE 10 MG/1
10 CAPSULE ORAL NIGHTLY
Qty: 30 CAPSULE | Refills: 1 | Status: SHIPPED | OUTPATIENT
Start: 2020-08-04 | End: 2020-09-15

## 2020-08-04 RX ORDER — LISINOPRIL 40 MG/1
40 TABLET ORAL DAILY
Qty: 90 TABLET | Refills: 3 | Status: SHIPPED | OUTPATIENT
Start: 2020-08-04 | End: 2021-08-16 | Stop reason: SDUPTHER

## 2020-08-04 RX ORDER — ATENOLOL 50 MG/1
50 TABLET ORAL DAILY
Qty: 90 TABLET | Refills: 3 | Status: SHIPPED | OUTPATIENT
Start: 2020-08-04 | End: 2021-08-16 | Stop reason: SDUPTHER

## 2020-08-04 NOTE — PROGRESS NOTES
The ABCs of the Annual Wellness Visit  Subsequent Medicare Wellness Visit    Chief Complaint   Patient presents with   • Medicare Wellness-subsequent     pt is here for SMW       Subjective   History of Present Illness:  Bernard Resendez is a 80 y.o. male who presents for a Subsequent Medicare Wellness Visit.    Patient also here today to discuss hypertension, hyperlipidemia, elevated glucose levels, elevated PSA levels, insomnia, and ulcerative colitis.  Taking and tolerating atenolol, lisinopril, sulfasalazine, zolpidem, doxepin, and mesalamine.    HEALTH RISK ASSESSMENT    Recent Hospitalizations:  No hospitalization(s) within the last year.    Current Medical Providers:  Patient Care Team:  Laci Mcnair MD as PCP - General (Family Medicine)    Smoking Status:  Social History     Tobacco Use   Smoking Status Former Smoker   • Packs/day: 0.25   • Types: Cigarettes   • Last attempt to quit:    • Years since quittin.6   Smokeless Tobacco Former User       Alcohol Consumption:  Social History     Substance and Sexual Activity   Alcohol Use Yes   • Alcohol/week: 2.0 standard drinks   • Types: 2 Shots of liquor per week    Comment: socially       Depression Screen:   PHQ-2/PHQ-9 Depression Screening 2020   Little interest or pleasure in doing things 0   Feeling down, depressed, or hopeless 0   Trouble falling or staying asleep, or sleeping too much -   Feeling tired or having little energy -   Poor appetite or overeating -   Feeling bad about yourself - or that you are a failure or have let yourself or your family down -   Trouble concentrating on things, such as reading the newspaper or watching television -   Moving or speaking so slowly that other people could have noticed. Or the opposite - being so fidgety or restless that you have been moving around a lot more than usual -   Thoughts that you would be better off dead, or of hurting yourself in some way -   Total Score 0   If you checked off any  problems, how difficult have these problems made it for you to do your work, take care of things at home, or get along with other people? -       Fall Risk Screen:  DAVID Fall Risk Assessment has not been completed.    Health Habits and Functional and Cognitive Screening:  Functional & Cognitive Status 8/4/2020   Do you have difficulty preparing food and eating? No   Do you have difficulty bathing yourself, getting dressed or grooming yourself? No   Do you have difficulty using the toilet? No   Do you have difficulty moving around from place to place? No   Do you have trouble with steps or getting out of a bed or a chair? No   Current Diet Well Balanced Diet   Dental Exam Up to date   Eye Exam Up to date   Exercise (times per week) 2 times per week   Current Exercise Activities Include Walking   Do you need help using the phone?  No   Are you deaf or do you have serious difficulty hearing?  Yes   Do you need help with transportation? No   Do you need help shopping? No   Do you need help preparing meals?  No   Do you need help with housework?  No   Do you need help with laundry? No   Do you need help taking your medications? No   Do you need help managing money? No   Do you ever drive or ride in a car without wearing a seat belt? No   Have you felt unusual stress, anger or loneliness in the last month? No   Who do you live with? Spouse   If you need help, do you have trouble finding someone available to you? No   Have you been bothered in the last four weeks by sexual problems? No   Do you have difficulty concentrating, remembering or making decisions? No         Does the patient have evidence of cognitive impairment? No    Asprin use counseling:Does not need ASA (and currently is not on it)    Age-appropriate Screening Schedule:  Refer to the list below for future screening recommendations based on patient's age, sex and/or medical conditions. Orders for these recommended tests are listed in the plan section. The  patient has been provided with a written plan.    Health Maintenance   Topic Date Due   • TDAP/TD VACCINES (1 - Tdap) 05/05/1951   • DXA SCAN  11/05/2017   • INFLUENZA VACCINE  08/01/2020   • LIPID PANEL  08/04/2021   • COLONOSCOPY  11/19/2021   • ZOSTER VACCINE  Completed          The following portions of the patient's history were reviewed and updated as appropriate: allergies, current medications, past family history, past medical history, past social history, past surgical history and problem list.    Outpatient Medications Prior to Visit   Medication Sig Dispense Refill   • Cholecalciferol (VITAMIN D3) 1000 UNITS capsule Take by mouth daily.     • CLINPRO 5000 1.1 % paste BRUSH AS DIRECTED BY DENTIST     • melatonin 5 MG tablet tablet Take 5 mg by mouth Every Night.     • Multiple Vitamins-Minerals (CENTRUM SILVER ADULT 50+ PO) Take by mouth.     • Omega-3 Fatty Acids (FISH OIL) 1200 MG capsule capsule Take by mouth.     • sulfaSALAzine (AZULFIDINE) 500 MG tablet Take 2 tablets by mouth 3 (Three) Times a Day. 180 tablet 11   • vitamin C (ASCORBIC ACID) 500 MG tablet Take 1,000 mg by mouth Daily.     • atenolol (TENORMIN) 50 MG tablet Take 1 tablet by mouth Daily. 90 tablet 3   • lisinopril (PRINIVIL,ZESTRIL) 40 MG tablet Take 1 tablet by mouth Daily. 90 tablet 3   • zolpidem (AMBIEN) 5 MG tablet Take 1 tablet by mouth At Night As Needed for Sleep. 90 tablet 0   • clotrimazole (LOTRIMIN) 1 % cream      • mesalamine (Lialda) 1.2 g EC tablet Take 4 tablets by mouth Daily. 120 tablet 12   • mupirocin (BACTROBAN) 2 % ointment      • predniSONE (DELTASONE) 10 MG tablet Take 1 tablet by mouth Daily. Taper as instructed 40, 30,20, 10, stop 70 tablet 1     No facility-administered medications prior to visit.        Patient Active Problem List   Diagnosis   • Chronic kidney disease, stage III (moderate) (CMS/HCC)   • Gout   • Hyperlipidemia   • Hypogonadism in male   • Insomnia d/t anxiety   • Osteopenia   • Ulcerative  "colitis (CMS/HCC)   • Hypertension   • Colon polyps   • Conjunctivitis of both eyes   • Sinusitis   • Bronchitis   • Ulcerative colitis without complications (CMS/HCC)       Advanced Care Planning:  ACP discussion was held with the patient during this visit. Patient has an advance directive (not in EMR), copy requested.    Review of Systems   Constitutional: Negative.    HENT: Positive for hearing loss.    Eyes: Negative.    Respiratory: Negative.    Cardiovascular: Negative.  Negative for chest pain, palpitations and leg swelling.   Gastrointestinal: Positive for abdominal pain.   Endocrine: Negative.    Genitourinary: Negative.    Musculoskeletal: Negative.    Skin: Negative.    Allergic/Immunologic: Negative.    Neurological: Negative.    Hematological: Negative.    Psychiatric/Behavioral: Negative.    All other systems reviewed and are negative.      Compared to one year ago, the patient feels his physical health is worse.  Compared to one year ago, the patient feels his mental health is the same.    Reviewed chart for potential of high risk medication in the elderly: yes  Reviewed chart for potential of harmful drug interactions in the elderly:yes    Objective         Vitals:    08/04/20 1337   BP: 136/62   BP Location: Right arm   Patient Position: Sitting   Pulse: 81   Temp: 98.9 °F (37.2 °C)   SpO2: 94%   Weight: 103 kg (226 lb 6.4 oz)   Height: 180.3 cm (71\")       Body mass index is 31.58 kg/m².  Discussed the patient's BMI with him. The BMI is above average; BMI management plan is completed.    Physical Exam   Constitutional: He is oriented to person, place, and time. He appears well-developed and well-nourished. No distress.   Neurological: He is alert and oriented to person, place, and time.   Skin: He is not diaphoretic.   Psychiatric: He has a normal mood and affect. His behavior is normal.   Nursing note and vitals reviewed.      Lab Results   Component Value Date    GLU 90 08/04/2020    CHLPL 235 " (H) 08/04/2020    TRIG 207 (H) 08/04/2020    HDL 39 (L) 08/04/2020     (H) 08/04/2020    VLDL 41.4 08/04/2020    HGBA1C 5.00 08/04/2020        Assessment/Plan   Medicare Risks and Personalized Health Plan  CMS Preventative Services Quick Reference  Advance Directive Discussion  Cardiovascular risk  Depression/Dysphoria  Diabetic Lab Screening   Prostate Cancer Screening     The above risks/problems have been discussed with the patient.  Pertinent information has been shared with the patient in the After Visit Summary.  Follow up plans and orders are seen below in the Assessment/Plan Section.    Diagnoses and all orders for this visit:    1. Medicare annual wellness visit, subsequent (Primary)  -     Comprehensive Metabolic Panel  -     Hemoglobin A1c  -     Lipid Panel  -     CBC Auto Differential    2. Essential hypertension  -     Comprehensive Metabolic Panel  -     Hemoglobin A1c  -     Lipid Panel  -     CBC Auto Differential  -     atenolol (TENORMIN) 50 MG tablet; Take 1 tablet by mouth Daily.  Dispense: 90 tablet; Refill: 3  -     lisinopril (PRINIVIL,ZESTRIL) 40 MG tablet; Take 1 tablet by mouth Daily.  Dispense: 90 tablet; Refill: 3    3. Hyperlipidemia, unspecified hyperlipidemia type  -     Comprehensive Metabolic Panel  -     Hemoglobin A1c  -     Lipid Panel  -     CBC Auto Differential    4. Elevated glucose  -     Comprehensive Metabolic Panel  -     Hemoglobin A1c  -     Lipid Panel  -     CBC Auto Differential    5. Elevated PSA  -     Comprehensive Metabolic Panel  -     Hemoglobin A1c  -     Lipid Panel  -     CBC Auto Differential    6. Insomnia, unspecified type  -     doxepin (SINEquan) 10 MG capsule; Take 1 capsule by mouth Every Night.  Dispense: 30 capsule; Refill: 1  -     zolpidem (AMBIEN) 5 MG tablet; Take 1 tablet by mouth At Night As Needed for Sleep.  Dispense: 90 tablet; Refill: 0    7. Ulcerative rectosigmoiditis with rectal bleeding (CMS/[facility])  -     Ambulatory Referral to  Gastroenterology    Other orders  -     CBC & Differential    Labs and refills as above.  Follow Up:  Return for As needed.     An After Visit Summary and PPPS were given to the patient.

## 2020-08-05 LAB
ALBUMIN SERPL-MCNC: 3.7 G/DL (ref 3.5–5.2)
ALBUMIN/GLOB SERPL: 1.8 G/DL
ALP SERPL-CCNC: 59 U/L (ref 39–117)
ALT SERPL-CCNC: 14 U/L (ref 1–41)
AST SERPL-CCNC: 19 U/L (ref 1–40)
BASOPHILS # BLD AUTO: 0.08 10*3/MM3 (ref 0–0.2)
BASOPHILS NFR BLD AUTO: 0.9 % (ref 0–1.5)
BILIRUB SERPL-MCNC: 0.5 MG/DL (ref 0–1.2)
BUN SERPL-MCNC: 22 MG/DL (ref 8–23)
BUN/CREAT SERPL: 18.8 (ref 7–25)
CALCIUM SERPL-MCNC: 8.7 MG/DL (ref 8.6–10.5)
CHLORIDE SERPL-SCNC: 104 MMOL/L (ref 98–107)
CHOLEST SERPL-MCNC: 235 MG/DL (ref 0–200)
CO2 SERPL-SCNC: 25.1 MMOL/L (ref 22–29)
CREAT SERPL-MCNC: 1.17 MG/DL (ref 0.76–1.27)
EOSINOPHIL # BLD AUTO: 0.4 10*3/MM3 (ref 0–0.4)
EOSINOPHIL NFR BLD AUTO: 4.3 % (ref 0.3–6.2)
ERYTHROCYTE [DISTWIDTH] IN BLOOD BY AUTOMATED COUNT: 13.4 % (ref 12.3–15.4)
GLOBULIN SER CALC-MCNC: 2.1 GM/DL
GLUCOSE SERPL-MCNC: 90 MG/DL (ref 65–99)
HBA1C MFR BLD: 5 % (ref 4.8–5.6)
HCT VFR BLD AUTO: 39 % (ref 37.5–51)
HDLC SERPL-MCNC: 39 MG/DL (ref 40–60)
HGB BLD-MCNC: 13 G/DL (ref 13–17.7)
IMM GRANULOCYTES # BLD AUTO: 0.18 10*3/MM3 (ref 0–0.05)
IMM GRANULOCYTES NFR BLD AUTO: 1.9 % (ref 0–0.5)
LDLC SERPL CALC-MCNC: 155 MG/DL (ref 0–100)
LYMPHOCYTES # BLD AUTO: 1.85 10*3/MM3 (ref 0.7–3.1)
LYMPHOCYTES NFR BLD AUTO: 19.8 % (ref 19.6–45.3)
MCH RBC QN AUTO: 29.3 PG (ref 26.6–33)
MCHC RBC AUTO-ENTMCNC: 33.3 G/DL (ref 31.5–35.7)
MCV RBC AUTO: 88 FL (ref 79–97)
MONOCYTES # BLD AUTO: 0.82 10*3/MM3 (ref 0.1–0.9)
MONOCYTES NFR BLD AUTO: 8.8 % (ref 5–12)
NEUTROPHILS # BLD AUTO: 6.02 10*3/MM3 (ref 1.7–7)
NEUTROPHILS NFR BLD AUTO: 64.3 % (ref 42.7–76)
NRBC BLD AUTO-RTO: 0.1 /100 WBC (ref 0–0.2)
PLATELET # BLD AUTO: 304 10*3/MM3 (ref 140–450)
POTASSIUM SERPL-SCNC: 5.1 MMOL/L (ref 3.5–5.2)
PROT SERPL-MCNC: 5.8 G/DL (ref 6–8.5)
RBC # BLD AUTO: 4.43 10*6/MM3 (ref 4.14–5.8)
SODIUM SERPL-SCNC: 139 MMOL/L (ref 136–145)
TRIGL SERPL-MCNC: 207 MG/DL (ref 0–150)
VLDLC SERPL CALC-MCNC: 41.4 MG/DL
WBC # BLD AUTO: 9.35 10*3/MM3 (ref 3.4–10.8)

## 2020-08-06 ENCOUNTER — PRIOR AUTHORIZATION (OUTPATIENT)
Dept: FAMILY MEDICINE CLINIC | Facility: CLINIC | Age: 80
End: 2020-08-06

## 2020-08-07 NOTE — PATIENT INSTRUCTIONS
Medicare Wellness  Personal Prevention Plan of Service     Date of Office Visit:  2020  Encounter Provider:  Laci Mcnair MD  Place of Service:  [facility]  Patient Name: Bernard Resendez  :  1940    As part of the Medicare Wellness portion of your visit today, we are providing you with this personalized preventive plan of services (PPPS). This plan is based upon recommendations of the [place] Preventive Services Task Force (USPSTF) and the Advisory Committee on Immunization Practices (ACIP).    This lists the preventive care services that should be considered, and provides dates of when you are due. Items listed as completed are up-to-date and do not require any further intervention.    Health Maintenance   Topic Date Due   • TDAP/TD VACCINES (1 - Tdap) 1949   • Pneumococcal Vaccine Once at 65 Years Old  2003   • DXA SCAN  2017   • MEDICARE ANNUAL WELLNESS  02/15/2019   • INFLUENZA VACCINE  2020   • LIPID PANEL  2021   • COLONOSCOPY  2021   • ZOSTER VACCINE  Completed       Orders Placed This Encounter   Procedures   • Comprehensive Metabolic Panel     Order Specific Question:   [facility] Has the patient fasted?     Answer:   No   • Hemoglobin A1c     Order Specific Question:   [facility] Has the patient fasted?     Answer:   No   • Lipid Panel     Order Specific Question:   [facility] Has the patient fasted?     Answer:   No   • CBC Auto Differential   • Ambulatory Referral to Gastroenterology     Referral Priority:   Routine     Referral Type:   Consultation     Referral Reason:   Second Opinion     Requested Specialty:   Gastroenterology     Number of Visits Requested:   1   • CBC & Differential     Order Specific Question:   [facility] Has the patient fasted?     Answer:   No       Return for As needed.

## 2020-08-10 ENCOUNTER — OFFICE VISIT (OUTPATIENT)
Dept: GASTROENTEROLOGY | Facility: CLINIC | Age: 80
End: 2020-08-10

## 2020-08-10 VITALS — TEMPERATURE: 98.2 F | BODY MASS INDEX: 31.42 KG/M2 | HEIGHT: 71 IN | WEIGHT: 224.4 LBS

## 2020-08-10 DIAGNOSIS — R19.7 DIARRHEA, UNSPECIFIED TYPE: ICD-10-CM

## 2020-08-10 DIAGNOSIS — K51.90 ULCERATIVE COLITIS WITHOUT COMPLICATIONS, UNSPECIFIED LOCATION (HCC): Primary | ICD-10-CM

## 2020-08-10 PROCEDURE — 99214 OFFICE O/P EST MOD 30 MIN: CPT | Performed by: NURSE PRACTITIONER

## 2020-08-10 NOTE — PROGRESS NOTES
Chief Complaint   Patient presents with   • Ulcerative Colitis   • Diarrhea     HPI    Bernard Resendez is a  80 y.o. male here for a follow up visit for ulcerative colitis.  This patient follows with Dr. Gifford known to me.  Patient diagnosed with left-sided ulcerative colitis greater than 20 years ago always left-sided, colonoscopy last year showed remission.  Patient recently presented with diarrhea, abdominal pain, rectal bleeding with flare.  He had elevated sed rate and CRP.  Fecal lactoferrin was positive.  He was transition from bowel aside to mesalamine and given a rapid prednisone taper.    Patient is been off of prednisone for approximately 3 weeks with progressive onset of diarrhea and lower abdominal cramping.  He is averaging 5-8 loose stools a day.  No abdominal pain.  No rectal bleeding.  No nocturnal symptoms.  He was recently switched to sulfasalazine 2 tablets 3 times a day.    No nausea, vomiting, acid reflux/heartburn, or dysphagia.  His appetite is fair.  His weight is stable.    Past Medical History:   Diagnosis Date   • Acute renal failure (CMS/HCC)    • Diverticulosis    • Hearing difficulty    • History of shingles    • Hyperlipidemia    • Hypertension    • Hypogonadism in male    • Insomnia    • Nephrolithiasis    • Osteopenia    • Osteopenia    • Renal insufficiency    • Ulcerative colitis (CMS/HCC)        Past Surgical History:   Procedure Laterality Date   • COLONOSCOPY  06/11/2015    NBIH, actuve colitis w/cryptitis, rare crypt abscess & superficial erosion, hyperplastic polyp   • COLONOSCOPY N/A 8/8/2017    IH, nodular mucosa in the proximal rectum and in the distal rectum, Diverticulosis in the sigmoid colon, the descending colon and at the splenic flexure.  PATH: Hyperplastic polyps    • COLONOSCOPY N/A 11/19/2019    Procedure: COLONOSCOPY to cecum with biopsies;  Surgeon: Calvin Gifford MD;  Location: [facility] ENDOSCOPY;  Service: Gastroenterology   • EYE SURGERY      cataract   •  KIDNEY STONE SURGERY     • TUMOR EXCISION      rectal tumor       Scheduled Meds:  Outpatient Encounter Medications as of 8/10/2020   Medication Sig Dispense Refill   • atenolol (TENORMIN) 50 MG tablet Take 1 tablet by mouth Daily. 90 tablet 3   • Cholecalciferol (VITAMIN D3) 1000 UNITS capsule Take by mouth daily.     • CLINPRO 5000 1.1 % paste BRUSH AS DIRECTED BY DENTIST     • doxepin (SINEquan) 10 MG capsule Take 1 capsule by mouth Every Night. 30 capsule 1   • lisinopril (PRINIVIL,ZESTRIL) 40 MG tablet Take 1 tablet by mouth Daily. 90 tablet 3   • Multiple Vitamins-Minerals (CENTRUM SILVER ADULT 50+ PO) Take by mouth.     • Omega-3 Fatty Acids (FISH OIL) 1200 MG capsule capsule Take by mouth.     • sulfaSALAzine (AZULFIDINE) 500 MG tablet Take 2 tablets by mouth 3 (Three) Times a Day. 180 tablet 11   • vitamin C (ASCORBIC ACID) 500 MG tablet Take 1,000 mg by mouth Daily.     • zolpidem (AMBIEN) 5 MG tablet Take 1 tablet by mouth At Night As Needed for Sleep. 90 tablet 0   • mesalamine (Lialda) 1.2 g EC tablet Take 4 tablets by mouth Daily. 120 tablet 12   • [DISCONTINUED] clotrimazole (LOTRIMIN) 1 % cream      • [DISCONTINUED] melatonin 5 MG tablet tablet Take 5 mg by mouth Every Night.     • [DISCONTINUED] mupirocin (BACTROBAN) 2 % ointment        No facility-administered encounter medications on file as of 8/10/2020.        Continuous Infusions:  No current facility-administered medications for this visit.     PRN Meds:.    Allergies   Allergen Reactions   • Ciprofloxacin Unknown (See Comments)     Pt unsure reaction       Social History     Socioeconomic History   • Marital status:      Spouse name: Not on file   • Number of children: Not on file   • Years of education: Not on file   • Highest education level: Not on file   Tobacco Use   • Smoking status: Former Smoker     Packs/day: 0.25     Types: Cigarettes     Last attempt to quit: 1981     Years since quittin.6   • Smokeless tobacco: Former  User   Substance and Sexual Activity   • Alcohol use: Yes     Alcohol/week: 2.0 standard drinks     Types: 2 Shots of liquor per week     Comment: socially   • Drug use: No   • Sexual activity: Defer       Family History   Problem Relation Age of Onset   • Hypertension Mother    • Stroke Mother    • Asthma Sister    • Ulcerative colitis Sister        Review of Systems   Constitutional: Negative for activity change, appetite change, fatigue, fever and unexpected weight change.   HENT: Negative for trouble swallowing.    Respiratory: Negative for apnea, cough, choking, chest tightness, shortness of breath and wheezing.    Cardiovascular: Negative for chest pain, palpitations and leg swelling.   Gastrointestinal: Positive for diarrhea. Negative for abdominal distention, abdominal pain, anal bleeding, blood in stool, constipation, nausea, rectal pain and vomiting.       Vitals:    08/10/20 1435   Temp: 98.2 °F (36.8 °C)       Physical Exam   Constitutional: He is oriented to person, place, and time. He appears well-developed and well-nourished.   Eyes: Pupils are equal, round, and reactive to light.   Cardiovascular: Normal rate, regular rhythm and normal heart sounds.   Pulmonary/Chest: Effort normal and breath sounds normal. No respiratory distress. He has no wheezes.   Abdominal: Soft. Bowel sounds are normal. He exhibits no distension and no mass. There is no tenderness. There is no guarding. No hernia.   Musculoskeletal: Normal range of motion.   Neurological: He is alert and oriented to person, place, and time.   Skin: Skin is warm and dry. Capillary refill takes less than 2 seconds.   Psychiatric: He has a normal mood and affect. His behavior is normal.       No radiology results for the last 7 days    Bernard was seen today for ulcerative colitis and diarrhea.    Diagnoses and all orders for this visit:    Ulcerative colitis without complications, unspecified location (CMS/HCC)  -     Calprotectin, Fecal - Stool,  Per Rectum    Diarrhea, unspecified type   -     Calprotectin, Fecal - Stool, Per Rectum    Assessment/plan    Pleasant 80-year-old male seen today in follow-up for ulcerative colitis with complaints of diarrhea symptoms for 1 month.  He had been taking Lialda but was transition to sulfasalazine due to GI upset.  Currently on sulfasalazine 2 tablets 3 times a day.    I spoke with Dr. Gifford regarding patient's plan of care.  Before moving to endoscopic evaluation recommend patient complete fecal calprotectin to evaluate colonic inflammation.  If this is abnormal we will increase sulfasalazine to 4.5 g/day.  This would be 3 tablets p.o. 3 times a day.     Further recommendations pending stool test results.

## 2020-08-12 ENCOUNTER — TELEPHONE (OUTPATIENT)
Dept: GASTROENTEROLOGY | Facility: CLINIC | Age: 80
End: 2020-08-12

## 2020-08-12 DIAGNOSIS — K51.90 ULCERATIVE COLITIS WITHOUT COMPLICATIONS, UNSPECIFIED LOCATION (HCC): Primary | ICD-10-CM

## 2020-08-12 NOTE — TELEPHONE ENCOUNTER
Call to pt.  Advise per JOHN Griffith note.  Verb understanding.     Stool kit at .  Order placed - message to JOHN Griffith.

## 2020-08-12 NOTE — TELEPHONE ENCOUNTER
----- Message from ANTELMO Marroquin sent at 8/11/2020  4:09 PM EDT -----  Regarding: stool test needed   Please call the patient and make him aware of Dr. Gifford's recommendations.  He would like for the patient to complete a fecal calprotectin to look for signs of inflammation.  If it comes back elevated we will increase his current dosage of sulfasalazine.  Please have him come by and  a stool kit and place order for fecal calprotectin.    ----- Message -----  From: Calvin Gifford MD  Sent: 8/11/2020   8:40 AM EDT  To: ANTELMO Marroquin  Subject: RE: Topical therapy                              Sounds like the sulfasalazine at this dose is just not working for him which it does have about a 30% failure rate.  The maximum dose of sulfasalazine to 6 g/day.  I got him currently on 3 g a day is that correct?      I see in November he had a colonoscopy that I said was normal visually but he had very mild inflammation in the biopsies and right around that time about 2 months later he had a fecal Mika normal at 31    Lets check a fecal Mika if it is now abnormal, lets bump his sulfasalazine to 4.5 g/day.  That would be 3 tablets p.o. 3 times daily, keep in mind I believe nausea at this dose is common so he may not tolerate it    ----- Message -----  From: Bibi Griffith APRN  Sent: 8/11/2020   8:33 AM EDT  To: Calvin Gifford MD  Subject: RE: Topical therapy                              Remember he had that procedure for hemorrhoids and has poor sphincter tone and he is not able to keep in topical therapy.    ----- Message -----  From: Calvin Gifford MD  Sent: 8/11/2020   8:08 AM EDT  To: ANTELMO Marroquin  Subject: Topical therapy                                  Lets try topical therapy rowasa every morning and Jared enema q. at bedtime and office visit with me 1 month in a new patient slot.  Continue sulfasalazine current dose  ----- Message -----  From: Bibi Griffith APRN  Sent: 8/10/2020    3:16 PM EDT  To: Calvin Gifford MD    Hung Wagner is having issues again.  He has been off of steroids for about 3 weeks with progressive onset of diarrhea and lower abdominal cramping.  No rectal bleeding.  He is currently taking sulfasalazine 2 tablets 3 times a day.  Would you like to move to flexible sigmoidoscopy with biopsy?  Do you want me to put him back on prednisone low-dose 20 mg/day?

## 2020-08-19 ENCOUNTER — TELEPHONE (OUTPATIENT)
Dept: GASTROENTEROLOGY | Facility: CLINIC | Age: 80
End: 2020-08-19

## 2020-08-19 NOTE — TELEPHONE ENCOUNTER
----- Message from Tio Mills sent at 8/19/2020 10:42 AM EDT -----  Contact: [phone]  Patient calling for results from stool sample.

## 2020-08-19 NOTE — TELEPHONE ENCOUNTER
Called pt and advised I received his message that he called for his stool results, but I do no see that this has resulted yet. Advised will look into getting results for him and call back. Pt verb understanding and states he turned the stool sample in to the office last Thursday.     Spoke with Ro, she could not find results in [facility]'s system. She will call them.

## 2020-08-21 ENCOUNTER — OFFICE (OUTPATIENT)
Dept: URBAN - METROPOLITAN AREA CLINIC 75 | Facility: CLINIC | Age: 80
End: 2020-08-21

## 2020-08-21 VITALS — HEIGHT: 71 IN | WEIGHT: 220 LBS | TEMPERATURE: 96 F

## 2020-08-21 DIAGNOSIS — R19.7 DIARRHEA, UNSPECIFIED: ICD-10-CM

## 2020-08-21 DIAGNOSIS — K51.00 ULCERATIVE (CHRONIC) PANCOLITIS WITHOUT COMPLICATIONS: ICD-10-CM

## 2020-08-21 PROCEDURE — 99203 OFFICE O/P NEW LOW 30 MIN: CPT | Performed by: INTERNAL MEDICINE

## 2020-08-21 NOTE — TELEPHONE ENCOUNTER
Called pt and advised of the previous note regarding what I found out about his stool specimen and expected timeline for results. He verb understanding.

## 2020-08-21 NOTE — TELEPHONE ENCOUNTER
Jody did not get back to me with results or an update on stool results.     Called [facility] and spoke with Barbara. She states the fecal calprotectin is done at an outside lab, not done in the [place] lab. She states the outside lab received his stool specimen on 8/17 and it usually results on 3-6 days. She states results should be back tonight or tomorrow.

## 2020-08-21 NOTE — TELEPHONE ENCOUNTER
Rachel Lacy, Tio Rep  [facility] [address] [place]   Phone Number: [phone]             Patient is very upset about not hearing back regarding his stool sample.          Results still not within Epic. Spoke with Ro, she will look in [facility]'s system ands see if stool has resulted there.

## 2020-08-26 LAB — CALPROTECTIN STL-MCNT: 238 UG/G (ref 0–120)

## 2020-08-28 NOTE — PROGRESS NOTES
Please let Bernard know his fecal Mika is elevated tell him it is 238 and he should call Dr. Anamika Donis (Bernard has gone for a second opinion ) and let him know this information as Dr. Donis may want to start new medications for him  I see Dr. Donis has not given his final formal recommendations from the August 21 office visit with Bernard  I will await these recommendations  If Bernard is transferring all his care to [facility] I am happy to collect all of his records and send to [facility]

## 2020-08-31 NOTE — TELEPHONE ENCOUNTER
Called pt and advised of the note from Dr Gifford. He states he did not see Dr Donis to establish care, he only saw him to get a second opinion. He was told he was being cared for appropriately. He wants to continue to see Dr Gifford in the future. He would like for him to sign off on the stool sample and let him know what it means and what he needs to do. Advised will update MD. Pt verb understanding.

## 2020-08-31 NOTE — TELEPHONE ENCOUNTER
Notes recorded by Calvin Gifford MD on 8/28/2020 at 3:20 PM EDT  Please let Bernard know his fecal Mika is elevated tell him it is 238 and he should call Dr. Anamika Donis (Bernard has gone for a second opinion ) and let him know this information as Dr. Donis may want to start new medications for him  I see Dr. Donis has not given his final formal recommendations from the August 21 office visit with Bernard  I will await these recommendations  If Bernard is transferring all his care to [facility] I am happy to collect all of his records and send to [facility]

## 2020-09-15 ENCOUNTER — OFFICE VISIT (OUTPATIENT)
Dept: INTERNAL MEDICINE | Facility: CLINIC | Age: 80
End: 2020-09-15

## 2020-09-15 VITALS
HEART RATE: 67 BPM | OXYGEN SATURATION: 96 % | DIASTOLIC BLOOD PRESSURE: 64 MMHG | SYSTOLIC BLOOD PRESSURE: 120 MMHG | WEIGHT: 225.8 LBS | BODY MASS INDEX: 31.61 KG/M2 | HEIGHT: 71 IN

## 2020-09-15 DIAGNOSIS — D22.9 ATYPICAL MOLE: ICD-10-CM

## 2020-09-15 DIAGNOSIS — D18.01 HEMANGIOMA OF SKIN: ICD-10-CM

## 2020-09-15 DIAGNOSIS — G47.00 INSOMNIA, UNSPECIFIED TYPE: Primary | ICD-10-CM

## 2020-09-15 DIAGNOSIS — N18.30 CHRONIC KIDNEY DISEASE, STAGE III (MODERATE) (HCC): ICD-10-CM

## 2020-09-15 DIAGNOSIS — I10 ESSENTIAL HYPERTENSION: ICD-10-CM

## 2020-09-15 DIAGNOSIS — K51.00 ULCERATIVE PANCOLITIS WITHOUT COMPLICATION (HCC): ICD-10-CM

## 2020-09-15 PROBLEM — J40 BRONCHITIS: Status: RESOLVED | Noted: 2018-06-20 | Resolved: 2020-09-15

## 2020-09-15 PROBLEM — H10.9 CONJUNCTIVITIS OF BOTH EYES: Status: RESOLVED | Noted: 2018-06-20 | Resolved: 2020-09-15

## 2020-09-15 PROBLEM — J32.9 SINUSITIS: Status: RESOLVED | Noted: 2018-06-20 | Resolved: 2020-09-15

## 2020-09-15 PROCEDURE — 99214 OFFICE O/P EST MOD 30 MIN: CPT | Performed by: FAMILY MEDICINE

## 2020-09-15 RX ORDER — MIRTAZAPINE 15 MG/1
TABLET, FILM COATED ORAL
Qty: 60 TABLET | Refills: 1 | Status: SHIPPED | OUTPATIENT
Start: 2020-09-15 | End: 2020-11-30

## 2020-09-15 NOTE — PROGRESS NOTES
Date of Encounter: 09/15/2020  Patient: Bernard Resendez,  1940    Subjective   History of Presenting Illness  Chief complaint: Insomnia    Insomnia: Present for several years, problems falling asleep, staying asleep.  Does not nap during the day.  Has tried several medications including melatonin, doxepin, zolpidem.  He does not like the side effects of these medications, especially drowsiness in the morning.  Sleep hygiene is reasonable, does not watch TV in the bedroom but reads sometimes.     Pruritic lesion: That her pruritic lesions, mostly on his back.  History of significant sun exposure during young adulthood.  Does not see a dermatologist regularly.    Just finishing recent ulcerative colitis flare.  Followed by gastroenterology regularly.  Most recent colonoscopy 2019.    Lives with wife.  Prior less than 10-pack-year smoker quit at age 48.  2 alcoholic drinks per week.  Does not exercise.  Lactose intolerant.  Retired restaurant//owner.  Former .  Will get pneumococcal, influenza vaccine at pharmacy today.    Review of Systems:  Negative for fever, congestion, chest pain upon exertion, shortness of breath, vision changes, vomiting    The following portions of the patient's history were reviewed and updated as appropriate: allergies, current medications, past family history, past medical history, past social history, past surgical history and problem list.    Patient Active Problem List   Diagnosis   • Chronic kidney disease, stage III (moderate) (CMS/HCC)   • Gout   • Hyperlipidemia   • Hypogonadism in male   • Insomnia d/t anxiety   • Osteopenia   • Hypertension   • Colon polyps   • Conjunctivitis of both eyes   • Sinusitis   • Bronchitis   • Ulcerative colitis without complications (CMS/HCC)     Past Medical History:   Diagnosis Date   • Acute renal failure (CMS/HCC)    • Diverticulosis    • Hearing difficulty    • History of shingles    • Hyperlipidemia    •  Hypertension    • Hypogonadism in male    • Insomnia    • Nephrolithiasis    • Osteopenia    • Osteopenia    • Renal insufficiency    • Ulcerative colitis (CMS/HCC)      Past Surgical History:   Procedure Laterality Date   • COLONOSCOPY  06/11/2015    NBIH, actuve colitis w/cryptitis, rare crypt abscess & superficial erosion, hyperplastic polyp   • COLONOSCOPY N/A 8/8/2017    IH, nodular mucosa in the proximal rectum and in the distal rectum, Diverticulosis in the sigmoid colon, the descending colon and at the splenic flexure.  PATH: Hyperplastic polyps    • COLONOSCOPY N/A 11/19/2019    Procedure: COLONOSCOPY to cecum with biopsies;  Surgeon: Calvin Gifford MD;  Location: [facility];  Service: Gastroenterology   • EYE SURGERY      cataract   • KIDNEY STONE SURGERY     • TUMOR EXCISION      rectal tumor     Family History   Problem Relation Age of Onset   • Hypertension Mother    • Stroke Mother    • Heart disease Mother    • Asthma Sister    • Ulcerative colitis Sister    • Emphysema Sister    • Lung disease Father         mesothelioma   • Glaucoma Father        Current Outpatient Medications:   •  atenolol (TENORMIN) 50 MG tablet, Take 1 tablet by mouth Daily., Disp: 90 tablet, Rfl: 3  •  Cholecalciferol (VITAMIN D3) 1000 UNITS capsule, Take by mouth daily., Disp: , Rfl:   •  CLINPRO 5000 1.1 % paste, BRUSH AS DIRECTED BY DENTIST, Disp: , Rfl:   •  lisinopril (PRINIVIL,ZESTRIL) 40 MG tablet, Take 1 tablet by mouth Daily., Disp: 90 tablet, Rfl: 3  •  mesalamine (Lialda) 1.2 g EC tablet, Take 4 tablets by mouth Daily., Disp: 120 tablet, Rfl: 12  •  Multiple Vitamins-Minerals (CENTRUM SILVER ADULT 50+ PO), Take by mouth., Disp: , Rfl:   •  Omega-3 Fatty Acids (FISH OIL) 1200 MG capsule capsule, Take by mouth., Disp: , Rfl:   •  sulfaSALAzine (AZULFIDINE) 500 MG tablet, Take 2 tablets by mouth 3 (Three) Times a Day., Disp: 180 tablet, Rfl: 11  •  vitamin C (ASCORBIC ACID) 500 MG tablet, Take 1,000 mg by mouth  "Daily., Disp: , Rfl:   •  mirtazapine (REMERON) 15 MG tablet, Take 1 tab PO nightly, Disp: 60 tablet, Rfl: 1  Allergies   Allergen Reactions   • Ciprofloxacin Unknown (See Comments)     Pt unsure reaction     Social History     Tobacco Use   • Smoking status: Former Smoker     Packs/day: 0.25     Types: Cigarettes     Quit date:      Years since quittin.7   • Smokeless tobacco: Former User   Substance Use Topics   • Alcohol use: Yes     Alcohol/week: 2.0 standard drinks     Types: 2 Shots of liquor per week     Comment: socially   • Drug use: No          Objective   Physical Exam  Vitals:    09/15/20 1243   BP: 120/64   Pulse: 67   SpO2: 96%   Weight: 102 kg (225 lb 12.8 oz)   Height: 180.3 cm (71\")     Body mass index is 31.49 kg/m².    Constitutional: NAD.  Eyes: EOMI. PERRLA. Normal conjunctiva.  Ear, nose, mouth, throat: No tonsillar exudates or erythema.   Normal nasal mucosa. Normal external ear canals and TMs bilaterally.  Cardiovascular: RRR. No murmurs. No LE edema b/l. Radial pulses 2+ bilaterally.  Pulmonary: CTA b/l. Good effort.  Integumentary: Numerous hemangiomas, seborrheic keratoses.  No obvious actinic keratosis.  Area of indicated pruritus is a 3 cm x 2 cm atypical mole with overlying scaling but no obvious signs of melanoma or squamous cell carcinoma.  Keratin horn on right upper back.  Lymphatic: No anterior cervical lymphadenopathy.  Endocrine: No thyromegaly or palpable thyroid nodules.  Psychiatric: Normal affect. Normal thought content       Assessment/Plan   Assessment and Plan  Pleasant 80-year-old male with ulcerative colitis, hypertension, hyperlipidemia, hypogonadism, osteopenia, CKD 3, insomnia, gout, who presents with the following:    Diagnoses and all orders for this visit:    1. Insomnia, unspecified type (Primary): Discussed options and decided to start mirtazapine due to good safety and side effect profile.  Discussed sleep hygiene.  -     mirtazapine (REMERON) 15 MG " tablet; Take 1 tab PO nightly  Dispense: 60 tablet; Refill: 1    2. Atypical mole: We will further dermatology for annual skin evaluation.  -     Ambulatory Referral to Dermatology  3. Hemangioma of skin  -     Ambulatory Referral to Dermatology    4. Ulcerative pancolitis without complication (CMS/HCC): Continue to follow with gastroenterology    5. Essential hypertension: Stable    6. Chronic kidney disease, stage III (moderate) (CMS/HCC) stable per recent labs    Patient influenza, PPSV23 at pharmacy    Follow-up in 3 to 4 months for chronic medical problems.  Labs at that time.    Ector Sykes MD  Family Medicine  O: [phone]  C: [phone]    Disclaimer: Parts of this note were dictated by speech recognition. Minor errors in transcription may be present. Please call if questions.

## 2020-09-18 ENCOUNTER — TELEPHONE (OUTPATIENT)
Dept: INTERNAL MEDICINE | Facility: CLINIC | Age: 80
End: 2020-09-18

## 2020-09-18 NOTE — TELEPHONE ENCOUNTER
Patient called said he read over his after visit summary from his visit Tuesday, and he doesn't have some of the conditions the summary says he does and that you never discussed these with him. He said his information must have gotten mixed up with someone else. Olivia looked at his AVS and it was his. His contact number is [phone].

## 2020-09-18 NOTE — TELEPHONE ENCOUNTER
Can you ask him what conditions he thinks are incorrect so I can either update his chart or clarify things for him

## 2020-09-18 NOTE — TELEPHONE ENCOUNTER
Please let the patient know that he has had fairly mild kidney disease, related to age and his blood pressure, since at least 2015 based on my review of his blood work.  This is fairly common for his age and, with regular monitoring, I do not expect it to be a problem.    He does have high blood pressure currently controlled with atenolol, which does work to lower heart rate and blood pressure.  Reviewing his prior blood pressures confirms that he has had high blood pressure.  In medicine, even if someone has controlled blood pressure, we still consider them to have the disease of high blood pressure.  This is a common point of confusion    Let me know if he needs any further clarification.

## 2020-10-07 ENCOUNTER — TELEPHONE (OUTPATIENT)
Dept: INTERNAL MEDICINE | Facility: CLINIC | Age: 80
End: 2020-10-07
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
## 2020-10-07 DIAGNOSIS — S39.92XA INJURY OF BUTTOCK, INITIAL ENCOUNTER: ICD-10-CM

## 2020-10-07 NOTE — TELEPHONE ENCOUNTER
PATIENT FELL 10 DAYS AGO AND HIS BUTTOCK IS BLACK AND BLUE AND HURTS ON HIS LEFT SIDE.  HE IS ASKING FOR A REFERRAL TO DR. STAN HENRY.  PLEASE ADVISE    PATIENT CALL BACK #: [phone]

## 2020-10-07 NOTE — TELEPHONE ENCOUNTER
S/w pt and he would rather just go to see Dr. Javon Paredes, an orthopedic surgeon with [facility]. His wife has seen him in the past, so he feels comfortable with him.    He is willing to have appt if you think that is necessary but he would much prefer to just have the referral.

## 2020-10-07 NOTE — TELEPHONE ENCOUNTER
Offer him an appt  If he does not want, then I need more information on Javon Rubin as he is not showing up on a search

## 2020-10-09 ENCOUNTER — TELEPHONE (OUTPATIENT)
Dept: ORTHOPEDIC SURGERY | Facility: CLINIC | Age: 80
End: 2020-10-09

## 2020-10-09 NOTE — TELEPHONE ENCOUNTER
Please see previous message and advise      Pt called in and said he fell on lt side and would like know if he can be worked in. There are no new patient slots available for October.

## 2020-10-09 NOTE — TELEPHONE ENCOUNTER
Patient did not want to see a female provider. After further discussion with im I found he had more hip pain than anything and scheduled him to see Dr. Moctezuma

## 2020-10-09 NOTE — TELEPHONE ENCOUNTER
Caller: LUPE RIOS    Relationship to patient: SELF     Best call back number: [phone]    Chief complaint: ENTIRE SIDE HURTING FROM RECENT FALL, SAID BRUISING FROM BUTTOCKS TO RIB AREA    Type of visit: NEW PATIENT

## 2020-10-12 ENCOUNTER — OFFICE VISIT (OUTPATIENT)
Dept: ORTHOPEDIC SURGERY | Facility: CLINIC | Age: 80
End: 2020-10-12

## 2020-10-12 VITALS — BODY MASS INDEX: 31.48 KG/M2 | HEIGHT: 71 IN | TEMPERATURE: 98.6 F | WEIGHT: 224.87 LBS

## 2020-10-12 DIAGNOSIS — M25.552 LEFT HIP PAIN: Primary | ICD-10-CM

## 2020-10-12 PROCEDURE — 99203 OFFICE O/P NEW LOW 30 MIN: CPT | Performed by: ORTHOPAEDIC SURGERY

## 2020-10-12 RX ORDER — ZOLPIDEM TARTRATE 5 MG/1
5 TABLET ORAL NIGHTLY PRN
COMMUNITY
End: 2020-11-30 | Stop reason: SDUPTHER

## 2020-10-12 NOTE — PROGRESS NOTES
General Exam    Patient: Bernard Resendez    YOB: 1940    Medical Record Number: [id number]    Chief Complaints: Left buttock and hip pain    History of Present Illness:     80 y.o. male patient who presents for evaluation of his left hip buttock.  Patient states about a week ago he fell onto his left buttock and hip.  He had pain but is able to weight-bear.  He reports that over this past week his symptoms are improving today.  He does state that sudden movements make his pain worse.  Some rest has made it better.  The pain does not radiate.  Pain is mainly located along his left buttock and lateral hip.  He also has some noted pain along his left flank.  Denies any radiation of the pain.      Denies any numbness or tingling.  Denies any fevers, cough or shortness of breath.    Allergies:   Allergies   Allergen Reactions   • Ciprofloxacin Unknown (See Comments)     Pt unsure reaction       Home Medications:      Current Outpatient Medications:   •  atenolol (TENORMIN) 50 MG tablet, Take 1 tablet by mouth Daily., Disp: 90 tablet, Rfl: 3  •  Cholecalciferol (VITAMIN D3) 1000 UNITS capsule, Take by mouth daily., Disp: , Rfl:   •  CLINPRO 5000 1.1 % paste, BRUSH AS DIRECTED BY DENTIST, Disp: , Rfl:   •  lisinopril (PRINIVIL,ZESTRIL) 40 MG tablet, Take 1 tablet by mouth Daily., Disp: 90 tablet, Rfl: 3  •  mesalamine (Lialda) 1.2 g EC tablet, Take 4 tablets by mouth Daily., Disp: 120 tablet, Rfl: 12  •  mirtazapine (REMERON) 15 MG tablet, Take 1 tab PO nightly, Disp: 60 tablet, Rfl: 1  •  Multiple Vitamins-Minerals (CENTRUM SILVER ADULT 50+ PO), Take by mouth., Disp: , Rfl:   •  Omega-3 Fatty Acids (FISH OIL) 1200 MG capsule capsule, Take by mouth., Disp: , Rfl:   •  sulfaSALAzine (AZULFIDINE) 500 MG tablet, Take 2 tablets by mouth 3 (Three) Times a Day., Disp: 180 tablet, Rfl: 11  •  vitamin C (ASCORBIC ACID) 500 MG tablet, Take 1,000 mg by mouth Daily., Disp: , Rfl:   •  zolpidem (AMBIEN) 5 MG tablet, Take 5  mg by mouth At Night As Needed for Sleep., Disp: , Rfl:     Past Medical History:   Diagnosis Date   • Acute renal failure (CMS/HCC)    • Diverticulosis    • Hearing difficulty    • History of shingles    • Hyperlipidemia    • Hypertension    • Hypogonadism in male    • Insomnia    • Nephrolithiasis    • Osteopenia    • Osteopenia    • Renal insufficiency    • Ulcerative colitis (CMS/HCC)        Past Surgical History:   Procedure Laterality Date   • COLONOSCOPY  2015    NBIH, actuve colitis w/cryptitis, rare crypt abscess & superficial erosion, hyperplastic polyp   • COLONOSCOPY N/A 2017    IH, nodular mucosa in the proximal rectum and in the distal rectum, Diverticulosis in the sigmoid colon, the descending colon and at the splenic flexure.  PATH: Hyperplastic polyps    • COLONOSCOPY N/A 2019    Procedure: COLONOSCOPY to cecum with biopsies;  Surgeon: Calvin Gifford MD;  Location: [facility] ENDOSCOPY;  Service: Gastroenterology   • EYE SURGERY      cataract   • KIDNEY STONE SURGERY     • TUMOR EXCISION      rectal tumor       Social History     Occupational History   • Not on file   Tobacco Use   • Smoking status: Former Smoker     Packs/day: 0.25     Types: Cigarettes     Quit date:      Years since quittin.8   • Smokeless tobacco: Former User   Substance and Sexual Activity   • Alcohol use: Yes     Alcohol/week: 2.0 standard drinks     Types: 2 Shots of liquor per week     Comment: socially   • Drug use: No   • Sexual activity: Defer      Social History     Social History Narrative   • Not on file       Family History   Problem Relation Age of Onset   • Hypertension Mother    • Stroke Mother    • Heart disease Mother    • Asthma Sister    • Ulcerative colitis Sister    • Emphysema Sister    • Lung disease Father         mesothelioma   • Glaucoma Father        Review of Systems:      Constitutional: Denies fever, shaking or chills   Eyes: Denies change in visual acuity   HEENT: Denies nasal  "congestion or sore throat   Respiratory: Denies cough or shortness of breath   Cardiovascular: Denies chest pain or edema  Endocrine: Denies tremors, palpitations, intolerance of heat or cold, polyuria, polydipsia.  GI: Denies abdominal pain, nausea, vomiting, bloody stools or diarrhea  : Denies frequency, urgency, incontinence, retention, or nocturia.  Musculoskeletal: Denies numbness, tingling or loss of motor function except as above  Integument: Denies rash, lesion or ulceration   Neurologic: Denies headache or focal weakness, deficits  Heme: Denies spontaneous or excessive bleeding, epistaxis, hematuria, melena, fatigue, enlarged or tender lymph nodes.      All other pertinent positives and negatives as noted above in HPI.    Physical Exam: 80 y.o. male    Vitals:    10/12/20 1632   Temp: 98.6 °F (37 °C)   TempSrc: Temporal   Weight: 102 kg (224 lb 13.9 oz)   Height: 180.3 cm (70.98\")       General:  Patient is awake and alert.  Appears in no acute distress or discomfort.    Psych:  Affect and demeanor are appropriate.    Eyes:  Conjunctiva and sclera appear grossly normal.  Eyes track well and EOM seem to be intact.    Ears:  No gross abnormalities.  Hearing adequate for the exam.    Cardiovascular:  Regular rate and rhythm.    Lungs:  Good chest expansion.  Breathing unlabored.    Lymph:  No palpable masses or adenopathy in the affected extremity    Musculoskeletal/Extremities:      Left lower extremity and buttock:    Patient has some ecchymosis along his left buttock along his sacrum.  He has tenderness to palpation along this area.  No deep tenderness to palpation along the ischium or greater trochanter.  Patient has full range of motion of the right lower extremity.  Strength is 5 out of 5.  Ankles up and down toes up and down.  Sensation is intact distally light touch.  Skin is intact.  2+ pedal pulses.    Gait is normal and unassisted.    Negative Stinchfield and logroll.    Left flank: Ecchymosis " some minimal tenderness with palpation.  Skin is intact no gross swelling.  No rib pain.         Radiology:       AP pelvis and lateral of the left hip were taken the office today.  Taken to evaluate the patient's complaint/s.  Joint space preserved bilateral hips.  Minimal osteophyte formation the inferior aspect of each      No imaging for comparison.    Assessment/Plan:      Left hip/buttock soft tissue injury    I discussed the imaging and clinical findings with the patient today.  For like this is a soft tissue injury.  His symptoms have been improving.  I recommended conservative treatment which includes activity modification, anti-inflammatories, ice as needed and then slow progression back into normal activities.  Patient would like to proceed with conservative management as listed above.  If he does not continue to improve over the next 4 weeks he will return to clinic           We will plan for follow up as needed    All questions were answered.  Patient understands and agrees with the plan.    Krishan Moctezuma MD    10/12/2020    CC to Ector Sykes MD    Procedures

## 2020-10-26 ENCOUNTER — OFFICE VISIT (OUTPATIENT)
Dept: GASTROENTEROLOGY | Facility: CLINIC | Age: 80
End: 2020-10-26

## 2020-10-26 VITALS — HEIGHT: 71 IN | TEMPERATURE: 97.3 F | BODY MASS INDEX: 32.7 KG/M2 | WEIGHT: 233.6 LBS

## 2020-10-26 DIAGNOSIS — K51.311 ULCERATIVE RECTOSIGMOIDITIS WITH RECTAL BLEEDING (HCC): Primary | ICD-10-CM

## 2020-10-26 PROCEDURE — 99213 OFFICE O/P EST LOW 20 MIN: CPT | Performed by: INTERNAL MEDICINE

## 2020-10-26 RX ORDER — DOXEPIN HYDROCHLORIDE 10 MG/1
10 CAPSULE ORAL DAILY
COMMUNITY
End: 2020-11-30

## 2020-10-26 NOTE — PROGRESS NOTES
Chief Complaint   Patient presents with   • Follow-up   • Ulcerative Colitis       Bernard Resendez is a  80 y.o. male here for a follow up visit for left-sided ulcerative colitis    Bernard has had a few flares here and there over the years, we usually able to get him in remission with a bit of steroid, or switching between balsalazide and mesalamine and sulfasalazine.  Currently on 3 g of sulfasalazine a day and in remission.  I been hesitant to step up therapy based on his age.  We have tried topical therapy in the past for left-sided ulcerative colitis but unfortunately he does not have much sphincter control and cannot hold the enemas in.  Again currently with normal formed stool no blood no pain no urgency no tenesmus on 3 g of sulfasalazine a day      Past Medical History:   Diagnosis Date   • Acute renal failure (CMS/HCC)    • Diverticulosis    • Hearing difficulty    • History of shingles    • Hyperlipidemia    • Hypertension    • Hypogonadism in male    • Insomnia    • Nephrolithiasis    • Osteopenia    • Osteopenia    • Renal insufficiency    • Ulcerative colitis (CMS/HCC)        Past Surgical History:   Procedure Laterality Date   • COLONOSCOPY  06/11/2015    NBIH, actuve colitis w/cryptitis, rare crypt abscess & superficial erosion, hyperplastic polyp   • COLONOSCOPY N/A 8/8/2017    IH, nodular mucosa in the proximal rectum and in the distal rectum, Diverticulosis in the sigmoid colon, the descending colon and at the splenic flexure.  PATH: Hyperplastic polyps    • COLONOSCOPY N/A 11/19/2019    Procedure: COLONOSCOPY to cecum with biopsies;  Surgeon: Calvin Gifford MD;  Location: [facility] ENDOSCOPY;  Service: Gastroenterology   • EYE SURGERY      cataract   • KIDNEY STONE SURGERY     • TUMOR EXCISION      rectal tumor       Scheduled Meds:    Continuous Infusions:No current facility-administered medications for this visit.       PRN Meds:.    Allergies   Allergen Reactions   • Ciprofloxacin Unknown (See  Comments)     Pt unsure reaction       Social History     Socioeconomic History   • Marital status:      Spouse name: Not on file   • Number of children: Not on file   • Years of education: Not on file   • Highest education level: Not on file   Tobacco Use   • Smoking status: Former Smoker     Packs/day: 0.25     Types: Cigarettes     Quit date:      Years since quittin.8   • Smokeless tobacco: Former User   Substance and Sexual Activity   • Alcohol use: Yes     Alcohol/week: 2.0 standard drinks     Types: 2 Shots of liquor per week     Comment: socially   • Drug use: No   • Sexual activity: Defer       Family History   Problem Relation Age of Onset   • Hypertension Mother    • Stroke Mother    • Heart disease Mother    • Asthma Sister    • Ulcerative colitis Sister    • Emphysema Sister    • Lung disease Father         mesothelioma   • Glaucoma Father        Review of Systems   Gastrointestinal: Negative for abdominal distention, abdominal pain, blood in stool, diarrhea, rectal pain and vomiting.   All other systems reviewed and are negative.      Vitals:    10/26/20 1248   Temp: 97.3 °F (36.3 °C)       Physical Exam  Vitals signs and nursing note reviewed.   Constitutional:       Appearance: He is well-developed.   HENT:      Head: Normocephalic and atraumatic.   Eyes:      Conjunctiva/sclera: Conjunctivae normal.   Neck:      Musculoskeletal: Normal range of motion.      Trachea: No tracheal deviation.   Cardiovascular:      Rate and Rhythm: Normal rate and regular rhythm.   Pulmonary:      Effort: Pulmonary effort is normal. No respiratory distress.      Breath sounds: Normal breath sounds.   Abdominal:      General: Bowel sounds are normal. There is no distension.      Palpations: Abdomen is soft. There is no mass.      Tenderness: There is no abdominal tenderness. There is no guarding or rebound.   Musculoskeletal: Normal range of motion.   Skin:     General: Skin is warm and dry.    Neurological:      Mental Status: He is alert and oriented to person, place, and time.   Psychiatric:         Judgment: Judgment normal.         Problem list    Ulcerative colitis, left-sided      Assessment/Plan    Continue sulfasalazine 3 g/day  Office visit 6 months  He is up-to-date with regard to flu shot, zoster vaccine  He will obtain COVID-19 vaccine when available

## 2020-11-24 RX ORDER — ZOLPIDEM TARTRATE 5 MG/1
5 TABLET ORAL NIGHTLY PRN
OUTPATIENT
Start: 2020-11-24

## 2020-11-24 NOTE — TELEPHONE ENCOUNTER
Caller: Bernard Resendez    Relationship: Self    Best call back number: [phone]    Medication needed:   Requested Prescriptions     Pending Prescriptions Disp Refills   • zolpidem (AMBIEN) 5 MG tablet        Sig: Take 1 tablet by mouth At Night As Needed for Sleep.       When do you need the refill by: HAS 4 LEFT    What details did the patient provide when requesting the medication: PRESCRIPTION FROM PREVIOUS PROVIDER, WAS ADVISED BY DR CAMPBELL TO CALL DR CAMPBELL TO HAVE NEW PRESCRIPTION WRITTEN WHEN OUT OF CURRENT PRESCRIPTION  PT STATES HE TYPICALLY 90 DAY SUPPLY    What is the patient's preferred pharmacy: [facility] [address] [address] [facility] [place] [employer] [phone] [facility] [phone]

## 2020-11-24 NOTE — TELEPHONE ENCOUNTER
"Last OV 9/15/2020. Dx of insomnia, note states: \"Has tried several medications including melatonin, doxepin, zolpidem. He does not like the side effects of these medications, especially drowsiness in the morning.\"    Please advise refill. No Contract on file, no UDS   "

## 2020-11-30 ENCOUNTER — OFFICE VISIT (OUTPATIENT)
Dept: INTERNAL MEDICINE | Facility: CLINIC | Age: 80
End: 2020-11-30

## 2020-11-30 DIAGNOSIS — I10 ESSENTIAL HYPERTENSION: ICD-10-CM

## 2020-11-30 DIAGNOSIS — E78.5 HYPERLIPIDEMIA, UNSPECIFIED HYPERLIPIDEMIA TYPE: ICD-10-CM

## 2020-11-30 DIAGNOSIS — G47.00 INSOMNIA, UNSPECIFIED TYPE: Primary | ICD-10-CM

## 2020-11-30 DIAGNOSIS — K51.00 ULCERATIVE PANCOLITIS WITHOUT COMPLICATION (HCC): ICD-10-CM

## 2020-11-30 PROCEDURE — 99213 OFFICE O/P EST LOW 20 MIN: CPT | Performed by: FAMILY MEDICINE

## 2020-11-30 RX ORDER — ZOLPIDEM TARTRATE 5 MG/1
5 TABLET ORAL NIGHTLY PRN
Qty: 90 TABLET | Refills: 0 | Status: SHIPPED | OUTPATIENT
Start: 2020-11-30 | End: 2021-01-15

## 2020-11-30 NOTE — PROGRESS NOTES
Date of Encounter: 2020  Patient: Bernard Resendez,  1940    Subjective   History of Presenting Illness  Chief complaint: Insomnia    Insomnia: Patient tried mirtazapine but did not have improvement with his insomnia.  As previously discussed in my prior notes, he has been on zolpidem, doxepin, melatonin with no benefit.  He would like to resume his zolpidem which he tolerated without side effects and felt improved the quality of his sleep.    Gastroenterology agrees that his ulcerative colitis is in remission on mesalamine.    No concerns with hypertension, hyperlipidemia regimen.    Review of Systems:  Negative for fever, cough, shortness of air, falls    The following portions of the patient's history were reviewed and updated as appropriate: allergies, current medications, past family history, past medical history, past social history, past surgical history and problem list.    Patient Active Problem List   Diagnosis   • Chronic kidney disease, stage III (moderate)   • Gout   • Hyperlipidemia   • Hypogonadism in male   • Insomnia d/t anxiety   • Osteopenia   • Hypertension   • Colon polyps   • Ulcerative colitis without complications (CMS/HCC)     Past Medical History:   Diagnosis Date   • Acute renal failure (CMS/HCC)    • Diverticulosis    • Hearing difficulty    • History of shingles    • Hyperlipidemia    • Hypertension    • Hypogonadism in male    • Insomnia    • Nephrolithiasis    • Osteopenia    • Osteopenia    • Renal insufficiency    • Ulcerative colitis (CMS/HCC)      Past Surgical History:   Procedure Laterality Date   • COLONOSCOPY  2015    NBIH, actuve colitis w/cryptitis, rare crypt abscess & superficial erosion, hyperplastic polyp   • COLONOSCOPY N/A 2017    IH, nodular mucosa in the proximal rectum and in the distal rectum, Diverticulosis in the sigmoid colon, the descending colon and at the splenic flexure.  PATH: Hyperplastic polyps    • COLONOSCOPY N/A 2019     Procedure: COLONOSCOPY to cecum with biopsies;  Surgeon: Calvin Gifford MD;  Location: [facility] ENDOSCOPY;  Service: Gastroenterology   • EYE SURGERY      cataract   • KIDNEY STONE SURGERY     • TUMOR EXCISION      rectal tumor     Family History   Problem Relation Age of Onset   • Hypertension Mother    • Stroke Mother    • Heart disease Mother    • Asthma Sister    • Ulcerative colitis Sister    • Emphysema Sister    • Lung disease Father         mesothelioma   • Glaucoma Father        Current Outpatient Medications:   •  atenolol (TENORMIN) 50 MG tablet, Take 1 tablet by mouth Daily., Disp: 90 tablet, Rfl: 3  •  Cholecalciferol (VITAMIN D3) 1000 UNITS capsule, Take by mouth daily., Disp: , Rfl:   •  CLINPRO 5000 1.1 % paste, BRUSH AS DIRECTED BY DENTIST, Disp: , Rfl:   •  lisinopril (PRINIVIL,ZESTRIL) 40 MG tablet, Take 1 tablet by mouth Daily., Disp: 90 tablet, Rfl: 3  •  Multiple Vitamins-Minerals (CENTRUM SILVER ADULT 50+ PO), Take by mouth., Disp: , Rfl:   •  Omega-3 Fatty Acids (FISH OIL) 1200 MG capsule capsule, Take by mouth., Disp: , Rfl:   •  sulfaSALAzine (AZULFIDINE) 500 MG tablet, Take 2 tablets by mouth 3 (Three) Times a Day., Disp: 180 tablet, Rfl: 11  •  vitamin C (ASCORBIC ACID) 500 MG tablet, Take 1,000 mg by mouth Daily., Disp: , Rfl:   •  zolpidem (AMBIEN) 5 MG tablet, Take 5 mg by mouth At Night As Needed for Sleep., Disp: , Rfl:   •  doxepin (SINEquan) 10 MG capsule, Take 10 mg by mouth Daily., Disp: , Rfl:   •  mesalamine (Lialda) 1.2 g EC tablet, Take 4 tablets by mouth Daily., Disp: 120 tablet, Rfl: 12  •  mirtazapine (REMERON) 15 MG tablet, Take 1 tab PO nightly, Disp: 60 tablet, Rfl: 1  Allergies   Allergen Reactions   • Ciprofloxacin Unknown (See Comments)     Pt unsure reaction     Social History     Tobacco Use   • Smoking status: Former Smoker     Packs/day: 0.25     Types: Cigarettes     Quit date:      Years since quittin.9   • Smokeless tobacco: Former User   Substance  Use Topics   • Alcohol use: Yes     Alcohol/week: 2.0 standard drinks     Types: 2 Shots of liquor per week     Comment: socially   • Drug use: No          Objective   Physical Exam  There were no vitals filed for this visit.  There is no height or weight on file to calculate BMI.    Psychiatric: Normal affect. Normal thought content.     Assessment/Plan   Assessment and Plan  Pleasant 80-year-old male with ulcerative colitis, hypertension, hyperlipidemia, hypogonadism, osteopenia, CKD 3, insomnia, gout, who presents with the following:    Diagnoses and all orders for this visit:    1. Insomnia, unspecified type (Primary): Discussed the risks and benefits of zolpidem.  He has been on this previously and reports significant functional improvement.  He has trialed and failed reasonable alternatives.  We will continue, number 90-day refill, drug screen and contract at next appointment in approximately 6 months  -     zolpidem (AMBIEN) 5 MG tablet; Take 1 tablet by mouth At Night As Needed for Sleep.  Dispense: 90 tablet; Refill: 0    2. Ulcerative pancolitis without complication (CMS/[facility]): In remission, continue to follow with gastroenterology    3. Hyperlipidemia, unspecified hyperlipidemia type: Stable    4. Essential hypertension: Stable    Follow-up in 4 months for medication refill, annual physical after 8/2021    You have chosen to receive care through a telephone visit. Do you consent to use a telephone visit for your medical care today? Yes    Spent 15 minutes on the phone with patient    Ector Sykes MD  Family Medicine  O: [phone]  C: [phone]    Disclaimer: Parts of this note were dictated by speech recognition. Minor errors in transcription may be present. Please call if questions.

## 2021-01-15 ENCOUNTER — OFFICE VISIT (OUTPATIENT)
Dept: INTERNAL MEDICINE | Facility: CLINIC | Age: 81
End: 2021-01-15

## 2021-01-15 VITALS
SYSTOLIC BLOOD PRESSURE: 126 MMHG | HEART RATE: 69 BPM | WEIGHT: 231.2 LBS | OXYGEN SATURATION: 98 % | DIASTOLIC BLOOD PRESSURE: 74 MMHG | TEMPERATURE: 97.3 F | HEIGHT: 71 IN | BODY MASS INDEX: 32.37 KG/M2

## 2021-01-15 DIAGNOSIS — S30.0XXA TRAUMATIC HEMATOMA OF BUTTOCK, INITIAL ENCOUNTER: ICD-10-CM

## 2021-01-15 DIAGNOSIS — L29.9 PRURITUS: ICD-10-CM

## 2021-01-15 DIAGNOSIS — I87.2 VENOUS STASIS DERMATITIS OF BOTH LOWER EXTREMITIES: ICD-10-CM

## 2021-01-15 DIAGNOSIS — G47.00 INSOMNIA, UNSPECIFIED TYPE: Primary | ICD-10-CM

## 2021-01-15 DIAGNOSIS — I10 ESSENTIAL HYPERTENSION: ICD-10-CM

## 2021-01-15 PROCEDURE — 99214 OFFICE O/P EST MOD 30 MIN: CPT | Performed by: FAMILY MEDICINE

## 2021-01-15 RX ORDER — ZOLPIDEM TARTRATE 6.25 MG/1
6.25 TABLET, FILM COATED, EXTENDED RELEASE ORAL NIGHTLY PRN
Qty: 30 TABLET | Refills: 0 | Status: SHIPPED | OUTPATIENT
Start: 2021-01-15 | End: 2021-02-17 | Stop reason: SDUPTHER

## 2021-01-15 RX ORDER — LORATADINE 10 MG/1
5 TABLET ORAL DAILY
Qty: 30 TABLET | Refills: 1 | Status: SHIPPED | OUTPATIENT
Start: 2021-01-15 | End: 2021-01-21

## 2021-01-15 RX ORDER — DOXEPIN HYDROCHLORIDE 10 MG/1
10 CAPSULE ORAL NIGHTLY
COMMUNITY
End: 2021-01-15

## 2021-01-15 NOTE — PROGRESS NOTES
Date of Encounter: 01/15/2021  Patient: Bernard Resendez,  1940    Subjective   History of Presenting Illness  Chief complaint: Insomnia    Insomnia: Zolpidem helping him fall asleep but he still wakes up later throughout the evening.  No longer taking doxepin.  He is not having side effects on this medication.    Itching, particularly around his ankles on both sides, as well as around several areas on his back.  He uses aloe and hydrocortisone which helps briefly but not for sustained period of time.  He has not seen his dermatologist and many years.    Lump in left buttock, fell in October.  Not improving in size.  Uncomfortable.  No fevers or chills.    He has signed up for the COVID-19 vaccine.    Review of Systems:  Negative for fever chills, cough    The following portions of the patient's history were reviewed and updated as appropriate: allergies, current medications, past family history, past medical history, past social history, past surgical history and problem list.    Patient Active Problem List   Diagnosis   • Chronic kidney disease, stage III (moderate)   • Hyperlipidemia   • Hypogonadism in male   • Insomnia d/t anxiety   • Osteopenia   • Hypertension   • Colon polyps   • Ulcerative colitis without complications (CMS/HCC)   • Traumatic hematoma of buttock   • Venous stasis dermatitis of both lower extremities   • Pruritus     Past Medical History:   Diagnosis Date   • Acute renal failure (CMS/HCC)    • Diverticulosis    • Gout 2016   • Hearing difficulty    • History of shingles    • Hyperlipidemia    • Hypertension    • Hypogonadism in male    • Insomnia    • Nephrolithiasis    • Osteopenia    • Osteopenia    • Renal insufficiency    • Ulcerative colitis (CMS/HCC)      Past Surgical History:   Procedure Laterality Date   • COLONOSCOPY  2015    NBIH, actuve colitis w/cryptitis, rare crypt abscess & superficial erosion, hyperplastic polyp   • COLONOSCOPY N/A 2017    IH, nodular mucosa  in the proximal rectum and in the distal rectum, Diverticulosis in the sigmoid colon, the descending colon and at the splenic flexure.  PATH: Hyperplastic polyps    • COLONOSCOPY N/A 2019    Procedure: COLONOSCOPY to cecum with biopsies;  Surgeon: Calvin Gifford MD;  Location: [facility] ENDOSCOPY;  Service: Gastroenterology   • EYE SURGERY      cataract   • KIDNEY STONE SURGERY     • TUMOR EXCISION      rectal tumor     Family History   Problem Relation Age of Onset   • Hypertension Mother    • Stroke Mother    • Heart disease Mother    • Asthma Sister    • Ulcerative colitis Sister    • Emphysema Sister    • Lung disease Father         mesothelioma   • Glaucoma Father        Current Outpatient Medications:   •  atenolol (TENORMIN) 50 MG tablet, Take 1 tablet by mouth Daily., Disp: 90 tablet, Rfl: 3  •  Cholecalciferol (VITAMIN D3) 1000 UNITS capsule, Take by mouth daily., Disp: , Rfl:   •  lisinopril (PRINIVIL,ZESTRIL) 40 MG tablet, Take 1 tablet by mouth Daily., Disp: 90 tablet, Rfl: 3  •  Multiple Vitamins-Minerals (CENTRUM SILVER ADULT 50+ PO), Take by mouth., Disp: , Rfl:   •  Omega-3 Fatty Acids (FISH OIL) 1200 MG capsule capsule, Take by mouth., Disp: , Rfl:   •  sulfaSALAzine (AZULFIDINE) 500 MG tablet, Take 2 tablets by mouth 3 (Three) Times a Day., Disp: 180 tablet, Rfl: 11  •  vitamin C (ASCORBIC ACID) 500 MG tablet, Take 1,000 mg by mouth Daily., Disp: , Rfl:   •  loratadine (CLARITIN) 10 MG tablet, Take 0.5 tablets by mouth Daily. For itching., Disp: 30 tablet, Rfl: 1  •  zolpidem CR (AMBIEN CR) 6.25 MG CR tablet, Take 1 tablet by mouth At Night As Needed for Sleep., Disp: 30 tablet, Rfl: 0  Allergies   Allergen Reactions   • Ciprofloxacin Unknown (See Comments)     Pt unsure reaction     Social History     Tobacco Use   • Smoking status: Former Smoker     Packs/day: 0.25     Types: Cigarettes     Quit date:      Years since quittin.0   • Smokeless tobacco: Former User   Substance Use  "Topics   • Alcohol use: Yes     Alcohol/week: 2.0 standard drinks     Types: 2 Shots of liquor per week     Comment: socially   • Drug use: No          Objective   Physical Exam  Vitals:    01/15/21 1226   BP: 126/74   Pulse: 69   Temp: 97.3 °F (36.3 °C)   TempSrc: Temporal   SpO2: 98%   Weight: 105 kg (231 lb 3.2 oz)   Height: 180.3 cm (71\")     Body mass index is 32.25 kg/m².    Constitutional: NAD.  Integumentary: Multiple atypical nevi, nevi, hemangiomas, and seborrheic keratoses.  The areas indicated of itching on his back and shoulder are not associated with any particular lesion.  Musculoskeletal: Approximately 12 cm or larger firm hematoma in the gluteal soft tissue.  Mildly tender to palpation but not fluctuant.  Psychiatric: Normal affect. Normal thought content.  Extremities: 1+ pitting edema of the ankles bilaterally with overlying venous stasis dermatitis.  No evidence of cellulitis.     Assessment/Plan   Assessment and Plan  Pleasant 80-year-old male with ulcerative colitis, hypertension, hyperlipidemia, hypogonadism, osteopenia, CKD 3, insomnia, history of gout, who presents with the following:     Diagnoses and all orders for this visit:    1. Insomnia, unspecified type (Primary): Switch from immediate release to controlled release zolpidem.  Labs including UDS at next appointment if doing well  -     zolpidem CR (AMBIEN CR) 6.25 MG CR tablet; Take 1 tablet by mouth At Night As Needed for Sleep.  Dispense: 30 tablet; Refill: 0    2. Traumatic hematoma of buttock, initial encounter: Due to size and lack of improvement, will refer to general surgery for evaluation and monitoring  -     Ambulatory Referral to General Surgery    3. Venous stasis dermatitis of both lower extremities: Recommend compression stockings which she has.    4. Pruritus: Recommend he follow-up with dermatology for general skin check, but for the itching which shows no associated skin lesion recommend low-dose loratadine as needed. "  Continue topical emollients and hydrocortisone as needed.  -     loratadine (CLARITIN) 10 MG tablet; Take 0.5 tablets by mouth Daily. For itching.  Dispense: 30 tablet; Refill: 1    5. Essential hypertension: Controlled    Follow-up as already scheduled, lab work at that time    Ector Sykes MD  Family Medicine  O: [phone]  C: [phone]    Disclaimer: Parts of this note were dictated by speech recognition. Minor errors in transcription may be present. Please call if questions.

## 2021-01-21 ENCOUNTER — OFFICE VISIT (OUTPATIENT)
Dept: SURGERY | Facility: CLINIC | Age: 81
End: 2021-01-21

## 2021-01-21 VITALS — WEIGHT: 233 LBS | HEIGHT: 71 IN | BODY MASS INDEX: 32.62 KG/M2

## 2021-01-21 DIAGNOSIS — S30.0XXA TRAUMATIC HEMATOMA OF BUTTOCK, INITIAL ENCOUNTER: Primary | ICD-10-CM

## 2021-01-21 PROCEDURE — 99203 OFFICE O/P NEW LOW 30 MIN: CPT | Performed by: SURGERY

## 2021-01-21 RX ORDER — CHOLECALCIFEROL (VITAMIN D3) 125 MCG
5 CAPSULE ORAL NIGHTLY
COMMUNITY
End: 2021-06-11

## 2021-01-21 RX ORDER — LORATADINE 10 MG/1
CAPSULE, LIQUID FILLED ORAL AS NEEDED
COMMUNITY
End: 2021-06-11

## 2021-01-21 NOTE — PROGRESS NOTES
Cc: Fall with hematoma to buttocks    History of presenting illness:   This is a quite nice, reasonably healthy 80-year-old gentleman who says that around 3 months ago he fell on some stairs near his garage and had some significant bruising in the buttocks on the left side.  Bruising resolved but there appeared to remain a mass which is mildly tender.  Over time the tenderness has become less, really only painful with direct pressure when he is sitting in certain positions.  However, the mass really has not gone down.  It has not grown larger.  There is no drainage.  There is no radiation of any discomfort.    Past Medical History: Ulcerative colitis, hyperlipidemia, chronic kidney disease    Past Surgical History: Multiple colonoscopies, kidney stone extraction, denies any abdominal surgery    Medications: Atenolol, vitamin D, lisinopril, sulfasalazine, Ambien, patient denies use of any anticoagulants    Allergies: Ciprofloxacin    Social History: He is a former smoker quit around 40 years ago    Family History: Negative for colon and rectal cancer, positive for ulcerative colitis in a sister    Review of Systems:  Constitutional: Negative for fever, chills, change in weight  Neck: no swollen glands or dysphagia or odynophagia  Respiratory: negative for SOB, cough, hemoptysis or wheezing  Cardiovascular: negative for chest pain, palpitations or peripheral edema  Gastrointestinal: Negative for nausea or vomiting, positive for constipation      Physical Exam:   Body mass index 32.5  General: alert and oriented, appropriate, no acute distress  Neck: Supple without lymphadenopathy or thyromegaly, trachea is in the midline  Respiratory: Lungs are clear bilaterally without wheezing, no use of accessory muscles is noted  Cardiovascular: Regular rate and rhythm without murmur, no peripheral edema  Gastrointestinal: Soft, benign, no mass or hernia felt  Rectal: Normal external anal exam.  In the more lateral portion of the  mid left buttocks there is a firm mass which is mildly tender.  There is no overlying bruising.      Laboratory data: No recent relevant data    Imaging data: None      Assessment and plan:   -Mass of the left buttocks, suspect this represents either scar residual from this traumatic injury or residual hematoma/seroma.  I discussed options with the patient.  I told him that simple observation, further imaging or surgical excision could be considered.  Patient prefers to proceed with imaging which I think is reasonable.  I have requested a CT of the abdomen and pelvis to further evaluate this.  We will then have him follow-up in the office and we can review the images.  I told him that if it is superficial he may be more likely to benefit from drainage and/or excision of this area.  If it is deeper within the muscle, the risks and hassle of the surgery may not be worth it.      Chad Sands MD, FACS  General, Minimally Invasive and Endoscopic Surgery  [facility]    [address], [address]  [place], [place], [address]  P: [phone]  F: [phone]

## 2021-01-28 ENCOUNTER — HOSPITAL ENCOUNTER (OUTPATIENT)
Dept: CT IMAGING | Facility: HOSPITAL | Age: 81
Discharge: HOME | End: 2021-01-28
Admitting: SURGERY

## 2021-01-28 DIAGNOSIS — S30.0XXA TRAUMATIC HEMATOMA OF BUTTOCK, INITIAL ENCOUNTER: ICD-10-CM

## 2021-01-28 PROCEDURE — 74176 CT ABD & PELVIS W/O CONTRAST: CPT

## 2021-02-04 ENCOUNTER — OFFICE VISIT (OUTPATIENT)
Dept: SURGERY | Facility: CLINIC | Age: 81
End: 2021-02-04

## 2021-02-04 VITALS — BODY MASS INDEX: 33.18 KG/M2 | HEIGHT: 71 IN | WEIGHT: 237 LBS

## 2021-02-04 DIAGNOSIS — S30.0XXD TRAUMATIC HEMATOMA OF BUTTOCK, SUBSEQUENT ENCOUNTER: Primary | ICD-10-CM

## 2021-02-04 PROCEDURE — 99213 OFFICE O/P EST LOW 20 MIN: CPT | Performed by: SURGERY

## 2021-02-17 ENCOUNTER — PRIOR AUTHORIZATION (OUTPATIENT)
Dept: INTERNAL MEDICINE | Facility: CLINIC | Age: 81
End: 2021-02-17

## 2021-02-17 ENCOUNTER — TELEPHONE (OUTPATIENT)
Dept: INTERNAL MEDICINE | Facility: CLINIC | Age: 81
End: 2021-02-17

## 2021-02-17 DIAGNOSIS — G47.00 INSOMNIA, UNSPECIFIED TYPE: ICD-10-CM

## 2021-02-17 RX ORDER — ZOLPIDEM TARTRATE 6.25 MG/1
6.25 TABLET, FILM COATED, EXTENDED RELEASE ORAL NIGHTLY PRN
Qty: 30 TABLET | Refills: 0 | Status: SHIPPED | OUTPATIENT
Start: 2021-02-17 | End: 2021-03-15

## 2021-02-17 NOTE — TELEPHONE ENCOUNTER
Caller: Bernard Resendez    Relationship: Self    Best call back number: [phone]    Medication needed:   Requested Prescriptions     Pending Prescriptions Disp Refills   • zolpidem CR (AMBIEN CR) 6.25 MG CR tablet 30 tablet 0     Sig: Take 1 tablet by mouth At Night As Needed for Sleep.       When do you need the refill by: 02/17    What details did the patient provide when requesting the medication: PATIENT STATED HE HAS ONE LEFT    Does the patient have less than a 3 day supply:  [x] Yes  [] No    What is the patient's preferred pharmacy: [facility] [address] [address] [facility] [phone] [facility] [phone]

## 2021-03-09 DIAGNOSIS — Z23 IMMUNIZATION DUE: ICD-10-CM

## 2021-03-15 ENCOUNTER — TELEPHONE (OUTPATIENT)
Dept: INTERNAL MEDICINE | Facility: CLINIC | Age: 81
End: 2021-03-15

## 2021-03-15 DIAGNOSIS — G47.00 INSOMNIA, UNSPECIFIED TYPE: ICD-10-CM

## 2021-03-15 RX ORDER — ZOLPIDEM TARTRATE 6.25 MG/1
TABLET, FILM COATED, EXTENDED RELEASE ORAL
Qty: 30 TABLET | Refills: 0 | Status: SHIPPED | OUTPATIENT
Start: 2021-03-15 | End: 2021-03-30 | Stop reason: SDUPTHER

## 2021-03-30 ENCOUNTER — OFFICE VISIT (OUTPATIENT)
Dept: INTERNAL MEDICINE | Facility: CLINIC | Age: 81
End: 2021-03-30

## 2021-03-30 VITALS
DIASTOLIC BLOOD PRESSURE: 78 MMHG | SYSTOLIC BLOOD PRESSURE: 148 MMHG | HEART RATE: 63 BPM | WEIGHT: 237.2 LBS | OXYGEN SATURATION: 96 % | BODY MASS INDEX: 33.21 KG/M2 | TEMPERATURE: 97.5 F | HEIGHT: 71 IN

## 2021-03-30 DIAGNOSIS — Z79.899 HIGH RISK MEDICATION USE: ICD-10-CM

## 2021-03-30 DIAGNOSIS — G47.00 INSOMNIA, UNSPECIFIED TYPE: Primary | ICD-10-CM

## 2021-03-30 DIAGNOSIS — E78.5 HYPERLIPIDEMIA, UNSPECIFIED HYPERLIPIDEMIA TYPE: ICD-10-CM

## 2021-03-30 DIAGNOSIS — E66.9 OBESITY, CLASS I, BMI 30-34.9: ICD-10-CM

## 2021-03-30 DIAGNOSIS — N18.31 STAGE 3A CHRONIC KIDNEY DISEASE (HCC): ICD-10-CM

## 2021-03-30 DIAGNOSIS — I10 ESSENTIAL HYPERTENSION: ICD-10-CM

## 2021-03-30 PROBLEM — E66.811 OBESITY, CLASS I, BMI 30-34.9: Status: ACTIVE | Noted: 2021-03-30

## 2021-03-30 PROCEDURE — 99214 OFFICE O/P EST MOD 30 MIN: CPT | Performed by: FAMILY MEDICINE

## 2021-03-30 RX ORDER — ZOLPIDEM TARTRATE 6.25 MG/1
6.25 TABLET, FILM COATED, EXTENDED RELEASE ORAL NIGHTLY PRN
Qty: 90 TABLET | Refills: 0 | Status: SHIPPED | OUTPATIENT
Start: 2021-03-30 | End: 2021-07-06

## 2021-03-30 NOTE — PROGRESS NOTES
Date of Encounter: 2021  Patient: Bernard Resendez,  1940    Subjective   History of Presenting Illness  Chief complaint: Follow-up insomnia    Summary: Sleep is improved with zolpidem 6.25 mg continuous release without side effects.  He would like to continue this if possible.    Hypertension: Has been adherent to medications.  Does not check home blood pressures.  He has gained approximately 7 pounds over the past few months. he has not been exercising and has been eating out fried food more frequently.  At home he tends to follow more diabetic diet because of his wife.  He is asking about medications for weight loss.    History of hyperlipidemia, was on statin medication but controlled with exercise, no longer on that.    Status post first Covid vaccination.    Review of Systems:  Negative for fever, cough, shortness of breath, abdominal pain, nausea, vomiting    The following portions of the patient's history were reviewed and updated as appropriate: allergies, current medications, past family history, past medical history, past social history, past surgical history and problem list.    Patient Active Problem List   Diagnosis   • Chronic kidney disease, stage III (moderate) (CMS/HCC)   • Hyperlipidemia   • Hypogonadism in male   • Insomnia d/t anxiety   • Osteopenia   • Hypertension   • Colon polyps   • Ulcerative colitis without complications (CMS/HCC)   • Traumatic hematoma of buttock   • Venous stasis dermatitis of both lower extremities   • Pruritus   • Obesity, Class I, BMI 30-34.9     Past Medical History:   Diagnosis Date   • Acute renal failure (CMS/HCC)    • Diverticulosis    • Gout 2016   • Hearing difficulty    • History of shingles    • Hyperlipidemia    • Hypertension    • Hypogonadism in male    • Insomnia    • Nephrolithiasis    • Osteopenia    • Osteopenia    • Renal insufficiency    • Ulcerative colitis (CMS/HCC)      Past Surgical History:   Procedure Laterality Date   •  COLONOSCOPY  06/11/2015    NBIH, actuve colitis w/cryptitis, rare crypt abscess & superficial erosion, hyperplastic polyp   • COLONOSCOPY N/A 8/8/2017    IH, nodular mucosa in the proximal rectum and in the distal rectum, Diverticulosis in the sigmoid colon, the descending colon and at the splenic flexure.  PATH: Hyperplastic polyps    • COLONOSCOPY N/A 11/19/2019    Procedure: COLONOSCOPY to cecum with biopsies;  Surgeon: Calvin Gifford MD;  Location: [facility] ENDOSCOPY;  Service: Gastroenterology   • EYE SURGERY      cataract   • KIDNEY STONE SURGERY     • TUMOR EXCISION      rectal tumor     Family History   Problem Relation Age of Onset   • Hypertension Mother    • Stroke Mother    • Heart disease Mother    • Asthma Sister    • Ulcerative colitis Sister    • Emphysema Sister    • Lung disease Father         mesothelioma   • Glaucoma Father        Current Outpatient Medications:   •  atenolol (TENORMIN) 50 MG tablet, Take 1 tablet by mouth Daily., Disp: 90 tablet, Rfl: 3  •  Cholecalciferol (VITAMIN D3) 1000 UNITS capsule, Take by mouth daily., Disp: , Rfl:   •  lisinopril (PRINIVIL,ZESTRIL) 40 MG tablet, Take 1 tablet by mouth Daily., Disp: 90 tablet, Rfl: 3  •  Multiple Vitamins-Minerals (CENTRUM SILVER ADULT 50+ PO), Take by mouth., Disp: , Rfl:   •  Omega-3 Fatty Acids (FISH OIL) 1200 MG capsule capsule, Take by mouth., Disp: , Rfl:   •  sulfaSALAzine (AZULFIDINE) 500 MG tablet, Take 2 tablets by mouth 3 (Three) Times a Day., Disp: 180 tablet, Rfl: 11  •  vitamin C (ASCORBIC ACID) 500 MG tablet, Take 1,000 mg by mouth Daily., Disp: , Rfl:   •  zolpidem CR (AMBIEN CR) 6.25 MG CR tablet, Take 1 tablet by mouth At Night As Needed for Sleep., Disp: 90 tablet, Rfl: 0  •  Loratadine (Claritin) 10 MG capsule, Take  by mouth As Needed., Disp: , Rfl:   •  melatonin 5 MG tablet tablet, Take 5 mg by mouth Every Night., Disp: , Rfl:   Allergies   Allergen Reactions   • Ciprofloxacin Unknown (See Comments)     Pt unsure  "reaction     Social History     Tobacco Use   • Smoking status: Former Smoker     Packs/day: 0.25     Types: Cigarettes     Quit date:      Years since quittin.2   • Smokeless tobacco: Former User   Vaping Use   • Vaping Use: Never used   Substance Use Topics   • Alcohol use: Yes     Alcohol/week: 2.0 standard drinks     Types: 2 Shots of liquor per week     Comment: socially   • Drug use: No          Objective   Physical Exam  Vitals:    21 1301   BP: 148/78   Pulse: 63   Temp: 97.5 °F (36.4 °C)   TempSrc: Temporal   SpO2: 96%   Weight: 108 kg (237 lb 3.2 oz)   Height: 180.3 cm (71\")     Body mass index is 33.08 kg/m².    Constitutional: NAD.  Integumentary: No rashes or wounds on face or upper extremities.  Psychiatric: Normal affect. Normal thought content.     Assessment/Plan   Assessment and Plan  Pleasant 80-year-old male with ulcerative colitis, hypertension, hyperlipidemia, hypogonadism, osteopenia, CKD 3, insomnia, history of gout, who presents with the following:    Diagnoses and all orders for this visit:    1. Insomnia, unspecified type (Primary): Discussed the risks and benefits of this medication.  For now we will continue with caution due to quality of life improvements with improved sleep.  UDS today.  Raudel reviewed.  -     zolpidem CR (AMBIEN CR) 6.25 MG CR tablet; Take 1 tablet by mouth At Night As Needed for Sleep.  Dispense: 90 tablet; Refill: 0  -     ToxASSURE Select 13 (MW) - Urine, Clean Catch    2. Stage 3a chronic kidney disease (CMS/HCC)  -     Comprehensive Metabolic Panel  -     Urinalysis With Microscopic - Urine, Clean Catch    3. Essential hypertension: Controlled per JNC, continue to monitor closely    4. Hyperlipidemia, unspecified hyperlipidemia type  -     Lipid Panel  -     Thyroid Panel With TSH    5. High risk medication use   -     ToxASSURE Select 13 (MW) - Urine, Clean Catch    6. Obesity, Class I, BMI 30-34.9: I do not recommend medications at his time, or " in context of his weight.  We did review his recent weight chart which shows that he tends to gain weight when he is eating out more frequently which explains the dip after COVID-19 pandemic started and dip after his recent fall.  I recommend he reduce eating out and resume walking daily if able.    Follow-up in 6 months for Medicare wellness exam.    Ector Sykes MD  Family Medicine  O: [phone]  C: [phone]    Disclaimer: Parts of this note were dictated by speech recognition. Minor errors in transcription may be present. Please call if questions.

## 2021-03-30 NOTE — PROGRESS NOTES
Venipuncture performed in [facility] by KD with good hemostasis. Patient tolerated well. 3/30/2021 Olivia QUINONEZ LPN.

## 2021-03-31 LAB
ALBUMIN SERPL-MCNC: 3.9 G/DL (ref 3.6–4.6)
ALBUMIN/GLOB SERPL: 1.4 {RATIO} (ref 1.2–2.2)
ALP SERPL-CCNC: 77 IU/L (ref 39–117)
ALT SERPL-CCNC: 12 IU/L (ref 0–44)
AST SERPL-CCNC: 18 IU/L (ref 0–40)
BILIRUB SERPL-MCNC: 0.4 MG/DL (ref 0–1.2)
BUN SERPL-MCNC: 24 MG/DL (ref 8–27)
BUN/CREAT SERPL: 20 (ref 10–24)
CALCIUM SERPL-MCNC: 9.2 MG/DL (ref 8.6–10.2)
CHLORIDE SERPL-SCNC: 104 MMOL/L (ref 96–106)
CHOLEST SERPL-MCNC: 226 MG/DL (ref 100–199)
CO2 SERPL-SCNC: 25 MMOL/L (ref 20–29)
CREAT SERPL-MCNC: 1.2 MG/DL (ref 0.76–1.27)
FT4I SERPL CALC-MCNC: 2 (ref 1.2–4.9)
GLOBULIN SER CALC-MCNC: 2.7 G/DL (ref 1.5–4.5)
GLUCOSE SERPL-MCNC: 101 MG/DL (ref 65–99)
HDLC SERPL-MCNC: 52 MG/DL
LDLC SERPL CALC-MCNC: 151 MG/DL (ref 0–99)
POTASSIUM SERPL-SCNC: 5.3 MMOL/L (ref 3.5–5.2)
PROT SERPL-MCNC: 6.6 G/DL (ref 6–8.5)
SODIUM SERPL-SCNC: 141 MMOL/L (ref 134–144)
T3RU NFR SERPL: 29 % (ref 24–39)
T4 SERPL-MCNC: 6.8 UG/DL (ref 4.5–12)
TRIGL SERPL-MCNC: 126 MG/DL (ref 0–149)
TSH SERPL DL<=0.005 MIU/L-ACNC: 3.37 UIU/ML (ref 0.45–4.5)
VLDLC SERPL CALC-MCNC: 23 MG/DL (ref 5–40)

## 2021-04-03 LAB
APPEARANCE UR: CLEAR
BACTERIA #/AREA URNS HPF: NORMAL /[HPF]
BILIRUB UR QL STRIP: NEGATIVE
COLOR UR: YELLOW
DRUGS UR: NORMAL
EPI CELLS #/AREA URNS HPF: NORMAL /HPF (ref 0–10)
GLUCOSE UR QL: NEGATIVE
HGB UR QL STRIP: NEGATIVE
KETONES UR QL STRIP: NEGATIVE
LEUKOCYTE ESTERASE UR QL STRIP: NEGATIVE
MICRO URNS: NORMAL
MICRO URNS: NORMAL
MUCOUS THREADS URNS QL MICRO: PRESENT
NITRITE UR QL STRIP: NEGATIVE
PH UR STRIP: 5.5 [PH] (ref 5–7.5)
PROT UR QL STRIP: NEGATIVE
RBC #/AREA URNS HPF: NORMAL /HPF (ref 0–2)
SP GR UR: 1.02 (ref 1–1.03)
UROBILINOGEN UR STRIP-MCNC: 0.2 MG/DL (ref 0.2–1)
WBC #/AREA URNS HPF: NORMAL /HPF (ref 0–5)

## 2021-06-07 ENCOUNTER — TRANSCRIBE ORDERS (OUTPATIENT)
Dept: PREADMISSION TESTING | Facility: HOSPITAL | Age: 81
End: 2021-06-07

## 2021-06-07 DIAGNOSIS — Z01.818 OTHER SPECIFIED PRE-OPERATIVE EXAMINATION: Primary | ICD-10-CM

## 2021-06-11 ENCOUNTER — PRE-ADMISSION TESTING (OUTPATIENT)
Dept: PREADMISSION TESTING | Facility: HOSPITAL | Age: 81
End: 2021-06-11

## 2021-06-11 VITALS
OXYGEN SATURATION: 95 % | HEART RATE: 61 BPM | RESPIRATION RATE: 16 BRPM | HEIGHT: 71 IN | TEMPERATURE: 98 F | SYSTOLIC BLOOD PRESSURE: 170 MMHG | DIASTOLIC BLOOD PRESSURE: 88 MMHG | BODY MASS INDEX: 33.88 KG/M2 | WEIGHT: 242 LBS

## 2021-06-11 LAB
ANION GAP SERPL CALCULATED.3IONS-SCNC: 7.2 MMOL/L (ref 5–15)
BUN SERPL-MCNC: 23 MG/DL (ref 8–23)
BUN/CREAT SERPL: 21.3 (ref 7–25)
CALCIUM SPEC-SCNC: 9.2 MG/DL (ref 8.6–10.5)
CHLORIDE SERPL-SCNC: 107 MMOL/L (ref 98–107)
CO2 SERPL-SCNC: 27.8 MMOL/L (ref 22–29)
CREAT SERPL-MCNC: 1.08 MG/DL (ref 0.76–1.27)
DEPRECATED RDW RBC AUTO: 44 FL (ref 37–54)
ERYTHROCYTE [DISTWIDTH] IN BLOOD BY AUTOMATED COUNT: 13.1 % (ref 12.3–15.4)
GFR SERPL CREATININE-BSD FRML MDRD: 65 ML/MIN/1.73
GLUCOSE SERPL-MCNC: 79 MG/DL (ref 65–99)
HCT VFR BLD AUTO: 41.7 % (ref 37.5–51)
HGB BLD-MCNC: 13.5 G/DL (ref 13–17.7)
MCH RBC QN AUTO: 29.7 PG (ref 26.6–33)
MCHC RBC AUTO-ENTMCNC: 32.4 G/DL (ref 31.5–35.7)
MCV RBC AUTO: 91.6 FL (ref 79–97)
PLATELET # BLD AUTO: 215 10*3/MM3 (ref 140–450)
PMV BLD AUTO: 9.6 FL (ref 6–12)
POTASSIUM SERPL-SCNC: 4.9 MMOL/L (ref 3.5–5.2)
QT INTERVAL: 387 MS
RBC # BLD AUTO: 4.55 10*6/MM3 (ref 4.14–5.8)
SODIUM SERPL-SCNC: 142 MMOL/L (ref 136–145)
WBC # BLD AUTO: 7.64 10*3/MM3 (ref 3.4–10.8)

## 2021-06-11 PROCEDURE — 93010 ELECTROCARDIOGRAM REPORT: CPT | Performed by: INTERNAL MEDICINE

## 2021-06-11 PROCEDURE — 93005 ELECTROCARDIOGRAM TRACING: CPT

## 2021-06-11 PROCEDURE — 80048 BASIC METABOLIC PNL TOTAL CA: CPT

## 2021-06-11 PROCEDURE — 36415 COLL VENOUS BLD VENIPUNCTURE: CPT

## 2021-06-11 PROCEDURE — 85027 COMPLETE CBC AUTOMATED: CPT

## 2021-06-11 NOTE — DISCHARGE INSTRUCTIONS
Take the following medications the morning of surgery with a small sip of water:    ATENOLOL  SULFASALAZINE    ARRIVE AT 1200.      If you are on prescription narcotic pain medication to control your pain you may also take that medication the morning of surgery.    General Instructions:  • Do not eat or drink anything after midnight the night before surgery.  • Infants may have breast milk up to four hours before surgery.  • Infants drinking formula may drink formula up to six hours before surgery.   • Patients who avoid smoking, chewing tobacco and alcohol for 4 weeks prior to surgery have a reduced risk of post-operative complications.  Quit smoking as many days before surgery as you can.  • Do not smoke, use chewing tobacco or drink alcohol the day of surgery.   • If applicable bring your C-PAP/ BI-PAP machine.  • Bring any papers given to you in the doctor’s office.  • Wear clean comfortable clothes.  • Do not wear contact lenses, false eyelashes or make-up.  Bring a case for your glasses.   • Bring crutches or walker if applicable.  • Remove all piercings.  Leave jewelry and any other valuables at home.  • Hair extensions with metal clips must be removed prior to surgery.  • The Pre-Admission Testing nurse will instruct you to bring medications if unable to obtain an accurate list in Pre-Admission Testing.        If you were given a blood bank ID arm band remember to bring it with you the day of surgery.    Preventing a Surgical Site Infection:  • For 2 to 3 days before surgery, avoid shaving with a razor because the razor can irritate skin and make it easier to develop an infection.    • Any areas of open skin can increase the risk of a post-operative wound infection by allowing bacteria to enter and travel throughout the body.  Notify your surgeon if you have any skin wounds / rashes even if it is not near the expected surgical site.  The area will need assessed to determine if surgery should be delayed until  it is healed.  • The night prior to surgery shower using a fresh bar of anti-bacterial soap (such as Dial) and clean washcloth.  Sleep in a clean bed with clean clothing.  Do not allow pets to sleep with you.  • Shower on the morning of surgery using a fresh bar of anti-bacterial soap (such as Dial) and clean washcloth.  Dry with a clean towel and dress in clean clothing.  • Ask your surgeon if you will be receiving antibiotics prior to surgery.  • Make sure you, your family, and all healthcare providers clean their hands with soap and water or an alcohol based hand  before caring for you or your wound.    Day of surgery:  Your arrival time is approximately two hours before your scheduled surgery time.  Upon arrival, a Pre-op nurse and Anesthesiologist will review your health history, obtain vital signs, and answer questions you may have.  The only belongings needed at this time will be your home medications and if applicable your C-PAP/BI-PAP machine.  A Pre-op nurse will start an IV and you may receive medication in preparation for surgery, including something to help you relax.      Please be aware that surgery does come with discomfort.  We want to make every effort to control your discomfort so please discuss any uncontrolled symptoms with your nurse.   Your doctor will most likely have prescribed pain medications.      If you are going home after surgery you will receive individualized written care instructions before being discharged.  A responsible adult must drive you to and from the hospital on the day of your surgery and stay with you for 24 hours.  Discharge prescriptions can be filled by the hospital pharmacy during regular pharmacy hours.  If you are having surgery late in the day/evening your prescription may be e-prescribed to your pharmacy.  Please verify your pharmacy hours or chose a 24 hour pharmacy to avoid not having access to your prescription because your pharmacy has closed for the  day.    If you are staying overnight following surgery, you will be transported to your hospital room following the recovery period.  [facility] has all private rooms.    If you have any questions please call Pre-Admission Testing at [phone].  Deductibles and co-payments are collected on the day of service. Please be prepared to pay the required co-pay, deductible or deposit on the day of service as defined by your plan.    Patient Education for Self-Quarantine Process    Following your COVID testing, we strongly recommend that you do not leave your home after you have been tested for COVID except to get medical care. This includes not going to work, school or to public areas.  If this is not possible for you to do please limit your activities to only required outings.  Be sure to wear a mask when you are with other people, practice social distancing and wash your hands frequently.      The following items provide additional details to keep you safe.  • Wash your hands with soap and water frequently for at least 20 seconds.   • Avoid touching your eyes, nose and mouth with unwashed hands.  • Do not share anything - utensils, towels, food from the same bowl.   • Have your own utensils, drinking glass, dishes, towels and bedding.   • Do not have visitors.   • Do use FaceTime to stay in touch with family and friends.  • You should stay in a specific room away from others if possible.   • Stay at least 6 feet away from others in the home if you cannot have a dedicated room to yourself.   • Do not snuggle with your pet. While the CDC says there is no evidence that pets can spread COVID-19 or be infected from humans, it is probably best to avoid “petting, snuggling, being kissed or licked and sharing food (during self-quarantine)”, according to the CDC.   • Sanitize household surfaces daily. Include all high touch areas (door handles, light switches, phones, countertops, etc.)  • Do not share a  bathroom with others, if possible.   • Wear a mask around others in your home if you are unable to stay in a separate room or 6 feet apart. If  you are unable to wear a mask, have your family member wear a mask if they must be within 6 feet of you.   Call your surgeon immediately if you experience any of the following symptoms:  • Sore Throat  • Shortness of Breath or difficulty breathing  • Cough  • Chills  • Body soreness or muscle pain  • Headache  • Fever  • New loss of taste or smell  • Do not arrive for your surgery ill.  Your procedure will need to be rescheduled to another time.  You will need to call your physician before the day of surgery to avoid any unnecessary exposure to hospital staff as well as other patients.

## 2021-06-15 ENCOUNTER — APPOINTMENT (OUTPATIENT)
Dept: LAB | Facility: HOSPITAL | Age: 81
End: 2021-06-15

## 2021-06-15 ENCOUNTER — LAB (OUTPATIENT)
Dept: LAB | Facility: HOSPITAL | Age: 81
End: 2021-06-15

## 2021-06-15 DIAGNOSIS — Z01.818 OTHER SPECIFIED PRE-OPERATIVE EXAMINATION: ICD-10-CM

## 2021-06-15 LAB — SARS-COV-2 ORF1AB RESP QL NAA+PROBE: NOT DETECTED

## 2021-06-15 PROCEDURE — C9803 HOPD COVID-19 SPEC COLLECT: HCPCS

## 2021-06-15 PROCEDURE — U0004 COV-19 TEST NON-CDC HGH THRU: HCPCS

## 2021-06-16 ENCOUNTER — ANESTHESIA EVENT (OUTPATIENT)
Dept: PERIOP | Facility: HOSPITAL | Age: 81
End: 2021-06-16

## 2021-06-16 ENCOUNTER — ANESTHESIA (OUTPATIENT)
Dept: PERIOP | Facility: HOSPITAL | Age: 81
End: 2021-06-16

## 2021-06-16 ENCOUNTER — HOSPITAL ENCOUNTER (OUTPATIENT)
Facility: HOSPITAL | Age: 81
Setting detail: HOSPITAL OUTPATIENT SURGERY
Discharge: HOME | End: 2021-06-16
Attending: UROLOGY | Admitting: UROLOGY

## 2021-06-16 VITALS
BODY MASS INDEX: 33.89 KG/M2 | HEART RATE: 58 BPM | SYSTOLIC BLOOD PRESSURE: 158 MMHG | TEMPERATURE: 97.4 F | DIASTOLIC BLOOD PRESSURE: 80 MMHG | WEIGHT: 242.1 LBS | OXYGEN SATURATION: 94 % | RESPIRATION RATE: 18 BRPM | HEIGHT: 71 IN

## 2021-06-16 DIAGNOSIS — N47.1 PHIMOSIS: ICD-10-CM

## 2021-06-16 PROCEDURE — 25010000002 PROPOFOL 10 MG/ML EMULSION: Performed by: NURSE ANESTHETIST, CERTIFIED REGISTERED

## 2021-06-16 PROCEDURE — 25010000002 CEFAZOLIN 1-4 GM/50ML-% SOLUTION: Performed by: UROLOGY

## 2021-06-16 PROCEDURE — 25010000002 FENTANYL CITRATE (PF) 50 MCG/ML SOLUTION: Performed by: NURSE ANESTHETIST, CERTIFIED REGISTERED

## 2021-06-16 PROCEDURE — 25010000002 ONDANSETRON PER 1 MG: Performed by: NURSE ANESTHETIST, CERTIFIED REGISTERED

## 2021-06-16 PROCEDURE — 25010000002 HYDROMORPHONE PER 4 MG: Performed by: NURSE ANESTHETIST, CERTIFIED REGISTERED

## 2021-06-16 PROCEDURE — 88304 TISSUE EXAM BY PATHOLOGIST: CPT | Performed by: UROLOGY

## 2021-06-16 PROCEDURE — 25010000002 NEOSTIGMINE 5 MG/10ML SOLUTION: Performed by: NURSE ANESTHETIST, CERTIFIED REGISTERED

## 2021-06-16 PROCEDURE — 25010000002 DEXAMETHASONE PER 1 MG: Performed by: NURSE ANESTHETIST, CERTIFIED REGISTERED

## 2021-06-16 RX ORDER — EPHEDRINE SULFATE 50 MG/ML
INJECTION, SOLUTION INTRAVENOUS AS NEEDED
Status: DISCONTINUED | OUTPATIENT
Start: 2021-06-16 | End: 2021-06-16 | Stop reason: SURG

## 2021-06-16 RX ORDER — NALOXONE HCL 0.4 MG/ML
0.2 VIAL (ML) INJECTION AS NEEDED
Status: DISCONTINUED | OUTPATIENT
Start: 2021-06-16 | End: 2021-06-16 | Stop reason: HOSPADM

## 2021-06-16 RX ORDER — SODIUM CHLORIDE 0.9 % (FLUSH) 0.9 %
3 SYRINGE (ML) INJECTION EVERY 12 HOURS SCHEDULED
Status: DISCONTINUED | OUTPATIENT
Start: 2021-06-16 | End: 2021-06-16 | Stop reason: HOSPADM

## 2021-06-16 RX ORDER — EPHEDRINE SULFATE 50 MG/ML
5 INJECTION, SOLUTION INTRAVENOUS ONCE AS NEEDED
Status: DISCONTINUED | OUTPATIENT
Start: 2021-06-16 | End: 2021-06-16 | Stop reason: HOSPADM

## 2021-06-16 RX ORDER — SODIUM CHLORIDE, SODIUM LACTATE, POTASSIUM CHLORIDE, CALCIUM CHLORIDE 600; 310; 30; 20 MG/100ML; MG/100ML; MG/100ML; MG/100ML
9 INJECTION, SOLUTION INTRAVENOUS CONTINUOUS
Status: DISCONTINUED | OUTPATIENT
Start: 2021-06-16 | End: 2021-06-16 | Stop reason: HOSPADM

## 2021-06-16 RX ORDER — SODIUM CHLORIDE 0.9 % (FLUSH) 0.9 %
3-10 SYRINGE (ML) INJECTION AS NEEDED
Status: DISCONTINUED | OUTPATIENT
Start: 2021-06-16 | End: 2021-06-16 | Stop reason: HOSPADM

## 2021-06-16 RX ORDER — PROMETHAZINE HYDROCHLORIDE 25 MG/1
25 TABLET ORAL ONCE AS NEEDED
Status: DISCONTINUED | OUTPATIENT
Start: 2021-06-16 | End: 2021-06-16 | Stop reason: HOSPADM

## 2021-06-16 RX ORDER — DEXAMETHASONE SODIUM PHOSPHATE 10 MG/ML
INJECTION INTRAMUSCULAR; INTRAVENOUS AS NEEDED
Status: DISCONTINUED | OUTPATIENT
Start: 2021-06-16 | End: 2021-06-16 | Stop reason: SURG

## 2021-06-16 RX ORDER — ROCURONIUM BROMIDE 10 MG/ML
INJECTION, SOLUTION INTRAVENOUS AS NEEDED
Status: DISCONTINUED | OUTPATIENT
Start: 2021-06-16 | End: 2021-06-16 | Stop reason: SURG

## 2021-06-16 RX ORDER — PROPOFOL 10 MG/ML
VIAL (ML) INTRAVENOUS AS NEEDED
Status: DISCONTINUED | OUTPATIENT
Start: 2021-06-16 | End: 2021-06-16 | Stop reason: SURG

## 2021-06-16 RX ORDER — MAGNESIUM HYDROXIDE 1200 MG/15ML
LIQUID ORAL AS NEEDED
Status: DISCONTINUED | OUTPATIENT
Start: 2021-06-16 | End: 2021-06-16 | Stop reason: HOSPADM

## 2021-06-16 RX ORDER — ONDANSETRON 2 MG/ML
INJECTION INTRAMUSCULAR; INTRAVENOUS AS NEEDED
Status: DISCONTINUED | OUTPATIENT
Start: 2021-06-16 | End: 2021-06-16 | Stop reason: SURG

## 2021-06-16 RX ORDER — LIDOCAINE HYDROCHLORIDE 10 MG/ML
0.5 INJECTION, SOLUTION EPIDURAL; INFILTRATION; INTRACAUDAL; PERINEURAL ONCE AS NEEDED
Status: DISCONTINUED | OUTPATIENT
Start: 2021-06-16 | End: 2021-06-16 | Stop reason: HOSPADM

## 2021-06-16 RX ORDER — PROMETHAZINE HYDROCHLORIDE 25 MG/1
25 SUPPOSITORY RECTAL ONCE AS NEEDED
Status: DISCONTINUED | OUTPATIENT
Start: 2021-06-16 | End: 2021-06-16 | Stop reason: HOSPADM

## 2021-06-16 RX ORDER — DIPHENHYDRAMINE HCL 25 MG
25 CAPSULE ORAL
Status: DISCONTINUED | OUTPATIENT
Start: 2021-06-16 | End: 2021-06-16 | Stop reason: HOSPADM

## 2021-06-16 RX ORDER — ONDANSETRON 2 MG/ML
4 INJECTION INTRAMUSCULAR; INTRAVENOUS ONCE AS NEEDED
Status: COMPLETED | OUTPATIENT
Start: 2021-06-16 | End: 2021-06-16

## 2021-06-16 RX ORDER — DIPHENHYDRAMINE HYDROCHLORIDE 50 MG/ML
12.5 INJECTION INTRAMUSCULAR; INTRAVENOUS
Status: DISCONTINUED | OUTPATIENT
Start: 2021-06-16 | End: 2021-06-16 | Stop reason: HOSPADM

## 2021-06-16 RX ORDER — HYDROMORPHONE HCL 110MG/55ML
PATIENT CONTROLLED ANALGESIA SYRINGE INTRAVENOUS AS NEEDED
Status: DISCONTINUED | OUTPATIENT
Start: 2021-06-16 | End: 2021-06-16 | Stop reason: SURG

## 2021-06-16 RX ORDER — LIDOCAINE HYDROCHLORIDE 20 MG/ML
INJECTION, SOLUTION INFILTRATION; PERINEURAL AS NEEDED
Status: DISCONTINUED | OUTPATIENT
Start: 2021-06-16 | End: 2021-06-16 | Stop reason: SURG

## 2021-06-16 RX ORDER — GLYCOPYRROLATE 0.2 MG/ML
INJECTION INTRAMUSCULAR; INTRAVENOUS AS NEEDED
Status: DISCONTINUED | OUTPATIENT
Start: 2021-06-16 | End: 2021-06-16 | Stop reason: SURG

## 2021-06-16 RX ORDER — CEFAZOLIN SODIUM 1 G/50ML
1 INJECTION, SOLUTION INTRAVENOUS ONCE
Status: COMPLETED | OUTPATIENT
Start: 2021-06-16 | End: 2021-06-16

## 2021-06-16 RX ORDER — HYDRALAZINE HYDROCHLORIDE 20 MG/ML
5 INJECTION INTRAMUSCULAR; INTRAVENOUS
Status: DISCONTINUED | OUTPATIENT
Start: 2021-06-16 | End: 2021-06-16 | Stop reason: HOSPADM

## 2021-06-16 RX ORDER — OXYCODONE HYDROCHLORIDE AND ACETAMINOPHEN 5; 325 MG/1; MG/1
1 TABLET ORAL EVERY 6 HOURS PRN
Qty: 15 TABLET | Refills: 0 | Status: SHIPPED | OUTPATIENT
Start: 2021-06-16 | End: 2021-10-08

## 2021-06-16 RX ORDER — BACITRACIN ZINC 500 [USP'U]/G
OINTMENT TOPICAL AS NEEDED
Status: DISCONTINUED | OUTPATIENT
Start: 2021-06-16 | End: 2021-06-16 | Stop reason: HOSPADM

## 2021-06-16 RX ORDER — NEOSTIGMINE METHYLSULFATE 0.5 MG/ML
INJECTION, SOLUTION INTRAVENOUS AS NEEDED
Status: DISCONTINUED | OUTPATIENT
Start: 2021-06-16 | End: 2021-06-16 | Stop reason: SURG

## 2021-06-16 RX ORDER — FENTANYL CITRATE 50 UG/ML
50 INJECTION, SOLUTION INTRAMUSCULAR; INTRAVENOUS
Status: DISCONTINUED | OUTPATIENT
Start: 2021-06-16 | End: 2021-06-16 | Stop reason: HOSPADM

## 2021-06-16 RX ORDER — LABETALOL HYDROCHLORIDE 5 MG/ML
5 INJECTION, SOLUTION INTRAVENOUS
Status: DISCONTINUED | OUTPATIENT
Start: 2021-06-16 | End: 2021-06-16 | Stop reason: HOSPADM

## 2021-06-16 RX ORDER — IBUPROFEN 600 MG/1
600 TABLET ORAL ONCE AS NEEDED
Status: DISCONTINUED | OUTPATIENT
Start: 2021-06-16 | End: 2021-06-16 | Stop reason: HOSPADM

## 2021-06-16 RX ORDER — FAMOTIDINE 10 MG/ML
20 INJECTION, SOLUTION INTRAVENOUS ONCE
Status: COMPLETED | OUTPATIENT
Start: 2021-06-16 | End: 2021-06-16

## 2021-06-16 RX ORDER — BUPIVACAINE HYDROCHLORIDE 2.5 MG/ML
INJECTION, SOLUTION INFILTRATION; PERINEURAL AS NEEDED
Status: DISCONTINUED | OUTPATIENT
Start: 2021-06-16 | End: 2021-06-16 | Stop reason: HOSPADM

## 2021-06-16 RX ORDER — HYDROCODONE BITARTRATE AND ACETAMINOPHEN 7.5; 325 MG/1; MG/1
1 TABLET ORAL ONCE AS NEEDED
Status: COMPLETED | OUTPATIENT
Start: 2021-06-16 | End: 2021-06-16

## 2021-06-16 RX ADMIN — GLYCOPYRROLATE 0.3 MG: 0.2 INJECTION INTRAMUSCULAR; INTRAVENOUS at 14:35

## 2021-06-16 RX ADMIN — ONDANSETRON 4 MG: 2 INJECTION INTRAMUSCULAR; INTRAVENOUS at 14:15

## 2021-06-16 RX ADMIN — FENTANYL CITRATE 50 MCG: 50 INJECTION, SOLUTION INTRAMUSCULAR; INTRAVENOUS at 15:15

## 2021-06-16 RX ADMIN — HYDROMORPHONE HYDROCHLORIDE 0.5 MG: 2 INJECTION, SOLUTION INTRAMUSCULAR; INTRAVENOUS; SUBCUTANEOUS at 13:53

## 2021-06-16 RX ADMIN — PROPOFOL 20 MG: 10 INJECTION, EMULSION INTRAVENOUS at 13:49

## 2021-06-16 RX ADMIN — NEOSTIGMINE METHYLSULFATE 3 MG: 0.5 INJECTION INTRAVENOUS at 14:35

## 2021-06-16 RX ADMIN — CEFAZOLIN SODIUM 1 G: 1 INJECTION, SOLUTION INTRAVENOUS at 13:32

## 2021-06-16 RX ADMIN — ROCURONIUM BROMIDE 20 MG: 50 INJECTION INTRAVENOUS at 14:03

## 2021-06-16 RX ADMIN — FAMOTIDINE 20 MG: 10 INJECTION INTRAVENOUS at 12:23

## 2021-06-16 RX ADMIN — ONDANSETRON 4 MG: 2 INJECTION INTRAMUSCULAR; INTRAVENOUS at 15:15

## 2021-06-16 RX ADMIN — EPHEDRINE SULFATE 10 MG: 50 INJECTION INTRAVENOUS at 14:23

## 2021-06-16 RX ADMIN — SODIUM CHLORIDE, POTASSIUM CHLORIDE, SODIUM LACTATE AND CALCIUM CHLORIDE 9 ML/HR: 600; 310; 30; 20 INJECTION, SOLUTION INTRAVENOUS at 12:23

## 2021-06-16 RX ADMIN — EPHEDRINE SULFATE 10 MG: 50 INJECTION INTRAVENOUS at 14:27

## 2021-06-16 RX ADMIN — PROPOFOL 150 MG: 10 INJECTION, EMULSION INTRAVENOUS at 13:48

## 2021-06-16 RX ADMIN — FENTANYL CITRATE 50 MCG: 50 INJECTION, SOLUTION INTRAMUSCULAR; INTRAVENOUS at 15:30

## 2021-06-16 RX ADMIN — HYDROCODONE BITARTRATE AND ACETAMINOPHEN 1 TABLET: 7.5; 325 TABLET ORAL at 15:15

## 2021-06-16 RX ADMIN — DEXAMETHASONE SODIUM PHOSPHATE 8 MG: 10 INJECTION INTRAMUSCULAR; INTRAVENOUS at 13:56

## 2021-06-16 RX ADMIN — LIDOCAINE HYDROCHLORIDE 100 MG: 20 INJECTION, SOLUTION INFILTRATION; PERINEURAL at 13:48

## 2021-06-16 NOTE — ANESTHESIA POSTPROCEDURE EVALUATION
Patient: Bernard Resendez    Procedure Summary     Date: 06/16/21 Room / Location: [facility] OR  / [facility] MAIN OR    Anesthesia Start: 1342 Anesthesia Stop: 1451    Procedure: CIRCUMCISION (N/A Penis) Diagnosis:     Surgeons: Milton Forrest MD Provider: Bernard David MD    Anesthesia Type: general ASA Status: 3          Anesthesia Type: general    Vitals  Vitals Value Taken Time   /66 06/16/21 1530   Temp 36.3 °C (97.4 °F) 06/16/21 1530   Pulse 56 06/16/21 1535   Resp 18 06/16/21 1530   SpO2 91 % 06/16/21 1542   Vitals shown include unvalidated device data.        Post Anesthesia Care and Evaluation    Patient location during evaluation: PACU  Patient participation: complete - patient participated  Level of consciousness: awake and alert  Pain management: adequate  Airway patency: patent  Anesthetic complications: No anesthetic complications    Cardiovascular status: acceptable  Respiratory status: acceptable  Hydration status: acceptable    Comments: --------------------            06/16/21               1550     --------------------   BP:       166/71     Pulse:      62       Resp:       18       Temp:                SpO2:      95%      --------------------

## 2021-06-16 NOTE — OP NOTE
Operative note.  81-year-old white male with phimosis presents today for scheduled circumcision.  Patient was brought to the operating room given a general anesthetic placed supine shaved prepped and draped.  A dorsal slit was created to allow traction of foreskin.  A circumferential incision was then made around the inner preputial skin approximately 1 cm from the coronal sulcus.  Foreskin was then placed back over the penis and a second circumferential incision was made on the external preputial skin just at the level of the coronal sulcus impression.  The redundant foreskin was then sharply dissected off the shaft of the penis and forwarded to pathology for inspection.  Skin was then reapproximated with interrupted 3-0 chromic suture.  Dressing was applied.

## 2021-06-16 NOTE — ANESTHESIA PREPROCEDURE EVALUATION
Anesthesia Evaluation     Patient summary reviewed and Nursing notes reviewed   no history of anesthetic complications:  NPO Solid Status: > 8 hours  NPO Liquid Status: > 2 hours           Airway   Mallampati: III  TM distance: >3 FB  Neck ROM: full  Possible difficult intubation and Large neck circumference  Dental - normal exam     Pulmonary - normal exam   (+) a smoker Former,   (-) COPD, asthma, lung cancer  Cardiovascular - normal exam  Exercise tolerance: good (4-7 METS)    ECG reviewed  Rhythm: regular  Rate: normal    (+) hypertension well controlled less than 2 medications, hyperlipidemia,   (-) valvular problems/murmurs, past MI, CAD, dysrhythmias, angina, CHF, orthopnea, cardiac stents, CABG, pericardial effusion      Neuro/Psych- negative ROS  (-) seizures, TIA, CVA  GI/Hepatic/Renal/Endo    (+) obesity,   renal disease CRI,   (-) hiatal hernia, GERD, PUD, hepatitis, liver disease, diabetes, GI bleed, no thyroid disorder    Musculoskeletal (-) negative ROS    Abdominal   (+) obese,     Abdomen: soft.   Substance History - negative use     OB/GYN negative ob/gyn ROS         Other      history of cancer remission                  Anesthesia Plan    ASA 3     general     intravenous induction     Anesthetic plan, all risks, benefits, and alternatives have been provided, discussed and informed consent has been obtained with: patient.    Plan discussed with CRNA.

## 2021-06-16 NOTE — H&P
Urology History and Physical  Patient Identification:  Name: Bernard Resendez  Age: 81 y.o.  Sex: male  :  1940  MRN: [id number]                       Chief Complaint:  phimosis    History of Present Illness: Pt presents for circumcision to address phimosis      Problem List:  @PROBList@  Past Medical History:  Past Medical History:   Diagnosis Date   • Acute renal failure (CMS/HCC)    • Cancer (CMS/HCC)     SKIN LESION REMOVED   • Diverticulosis    • Gout 2016   • Hearing difficulty    • History of shingles    • Hyperlipidemia    • Hypertension    • Hypogonadism in male    • Insomnia    • Nephrolithiasis    • Osteopenia    • Osteopenia    • Renal insufficiency    • Ulcerative colitis (CMS/HCC)      Past Surgical History:  Past Surgical History:   Procedure Laterality Date   • COLONOSCOPY  2015    [facility], actuve colitis w/cryptitis, rare crypt abscess & superficial erosion, hyperplastic polyp   • COLONOSCOPY N/A 2017    IH, nodular mucosa in the proximal rectum and in the distal rectum, Diverticulosis in the sigmoid colon, the descending colon and at the splenic flexure.  PATH: Hyperplastic polyps    • COLONOSCOPY N/A 2019    Procedure: COLONOSCOPY to cecum with biopsies;  Surgeon: Calvin Gifford MD;  Location: [facility] ENDOSCOPY;  Service: Gastroenterology   • EYE SURGERY      cataract   • KIDNEY STONE SURGERY     • TUMOR EXCISION      rectal tumor      Home Meds:  Medications Prior to Admission   Medication Sig Dispense Refill Last Dose   • atenolol (TENORMIN) 50 MG tablet Take 1 tablet by mouth Daily. 90 tablet 3 2021 at 0830   • sulfaSALAzine (AZULFIDINE) 500 MG tablet Take 2 tablets by mouth 3 (Three) Times a Day. 180 tablet 11 2021 at 0830   • Cholecalciferol (VITAMIN D3) 1000 UNITS capsule Take 1,000 Units by mouth Daily.   2021   • lisinopril (PRINIVIL,ZESTRIL) 40 MG tablet Take 1 tablet by mouth Daily. 90 tablet 3 2021   • Multiple Vitamins-Minerals (CENTRUM  SILVER ADULT 50+ PO) Take 1 tablet by mouth Daily. HOLD PER MD INSTR   2021   • Omega-3 Fatty Acids (FISH OIL) 1200 MG capsule capsule Take 1,200 mg by mouth Daily With Breakfast. HOLD PER MD INSTR   2021   • vitamin C (ASCORBIC ACID) 500 MG tablet Take 1,000 mg by mouth Daily. HOLD PER MD INSTR   2021   • zolpidem CR (AMBIEN CR) 6.25 MG CR tablet Take 1 tablet by mouth At Night As Needed for Sleep. 90 tablet 0 6/15/2021 at 2330     Current Meds:   Current Facility-Administered Medications   Medication Dose Route Frequency Provider Last Rate Last Admin   • ceFAZolin (ANCEF) IVPB 1 g  1 g Intravenous Once Milton Forrest MD       • lactated ringers infusion  9 mL/hr Intravenous Continuous Bhaskar Hubbard MD 9 mL/hr at 21 1223 9 mL/hr at 21 1223   • lidocaine PF 1% (XYLOCAINE) injection 0.5 mL  0.5 mL Injection Once PRN Bhaskar Hubbard MD       • sodium chloride 0.9 % flush 3 mL  3 mL Intravenous Q12H Bhaskar Hubbard MD       • sodium chloride 0.9 % flush 3-10 mL  3-10 mL Intravenous PRN Bhaskar Hubbard MD         Allergies:  Allergies   Allergen Reactions   • Ciprofloxacin Rash     Pt unsure reaction     Immunizations:  Immunization History   Administered Date(s) Administered   • COVID-19 (PFIZER) 2021   • FLUAD TRI 65YR+ 10/29/2019   • Flu Vaccine Quad PF >18YRS 10/28/2016   • Fluzone High Dose =>65 Years ([employer] ONLY) 10/16/2020   • Pneumococcal Conjugate 13-Valent (PCV13) 2017   • Shingrix 10/02/2019, 09/15/2020     Social History:   Social History     Tobacco Use   • Smoking status: Former Smoker     Packs/day: 0.25     Years: 20.00     Pack years: 5.00     Types: Cigarettes     Quit date:      Years since quittin.4   • Smokeless tobacco: Former User   Substance Use Topics   • Alcohol use: Yes     Alcohol/week: 2.0 standard drinks     Types: 2 Shots of liquor per week     Comment: socially      Family History:  Family History   Problem Relation Age of  "Onset   • Hypertension Mother    • Stroke Mother    • Heart disease Mother    • Asthma Sister    • Ulcerative colitis Sister    • Emphysema Sister    • Lung disease Father         mesothelioma   • Glaucoma Father    • Malig Hyperthermia Neg Hx         Review of Systems  negative 12 point system review except:as above    Objective:  tMax 24 hrs: Temp (24hrs), Av.2 °F (36.8 °C), Min:98.2 °F (36.8 °C), Max:98.2 °F (36.8 °C)    Vitals Ranges:   Temp:  [98.2 °F (36.8 °C)] 98.2 °F (36.8 °C)  Heart Rate:  [61] 61  Resp:  [20] 20  BP: (180)/(74) 180/74  Intake and Output Last 3 Shifts:   No intake/output data recorded.    Exam:  /74 (BP Location: Left arm, Patient Position: Lying)   Pulse 61   Temp 98.2 °F (36.8 °C) (Oral)   Resp 20   Ht 180.3 cm (71\")   Wt 110 kg (242 lb 1.6 oz)   SpO2 96%   BMI 33.77 kg/m²      GENERAL:    Alert, cooperative, no distress, appears stated age   Head:    Normocephalic, without obvious abnormality, atraumatic   Ears:    Normal external inspection, Nl. hearing   Throat:   Lips, mucosa, and tongue normal   Back:     No CVA tenderness   Lungs:     Respirations unlabored;Normal palpation   CV;   Regular rate and rhythm, No edema   Abdomen:     Soft, nontender,  no masses   :    phimosis   Musculoskeletal:   Extremities normal,Gait Normal   Neurologic/Psych:   Orientation Normal; Mood/Affect Normal       Data Review:  Lab on 06/15/2021   Component Date Value Ref Range Status   • COVID19 06/15/2021 Not Detected  Not Detected - Ref. Range Final       No results found.      Assessment:   phimosis      Plan:   Circumcision, rbos reviewed.       [unfilled]  2021        "

## 2021-06-16 NOTE — ANESTHESIA PROCEDURE NOTES
Airway  Urgency: elective    Date/Time: 6/16/2021 1:50 PM  Airway not difficult    General Information and Staff    Patient location during procedure: OR  Anesthesiologist: Bernard David MD  CRNA: Luma King CRNA    Indications and Patient Condition  Indications for airway management: airway protection    Preoxygenated: yes  MILS not maintained throughout  Mask difficulty assessment: 0 - not attempted    Final Airway Details  Final airway type: supraglottic airway      Successful airway: unique  Size 5    Number of attempts at approach: 1  Assessment: lips, teeth, and gum same as pre-op and atraumatic intubation    Additional Comments  LMA inserted with ease, assist to SV

## 2021-06-17 LAB
LAB AP CASE REPORT: NORMAL
PATH REPORT.FINAL DX SPEC: NORMAL
PATH REPORT.GROSS SPEC: NORMAL

## 2021-06-18 ENCOUNTER — TELEPHONE (OUTPATIENT)
Dept: GASTROENTEROLOGY | Facility: CLINIC | Age: 81
End: 2021-06-18

## 2021-06-18 NOTE — TELEPHONE ENCOUNTER
----- Message from Tio Healy Rep sent at 6/18/2021  3:04 PM EDT -----  Regarding: med  Contact: [phone]  Pt states this med is no longer available, is there something equivalent that can be called in       sulfaSALAzine (AZULFIDINE) 500 MG tablet       [facility] [address], [place] - [address] [facility] [email or web address] [place] - [phone]  - [phone]    [address] [address]   Phone:  [phone]  Fax:  [phone]

## 2021-06-19 DIAGNOSIS — L29.9 PRURITUS: ICD-10-CM

## 2021-06-22 NOTE — TELEPHONE ENCOUNTER
Patient called. Advised there is a nationwide shortage for Sulfasalazine. Advised will send an update to Dr. Gifford for advisement. He verb understanding.

## 2021-06-25 ENCOUNTER — TELEPHONE (OUTPATIENT)
Dept: INTERNAL MEDICINE | Facility: CLINIC | Age: 81
End: 2021-06-25

## 2021-06-25 RX ORDER — LORATADINE 10 MG/1
TABLET ORAL
Qty: 45 TABLET | Refills: 1 | Status: SHIPPED | OUTPATIENT
Start: 2021-06-25 | End: 2021-10-08

## 2021-06-25 NOTE — TELEPHONE ENCOUNTER
Patient phones to check status of June 19th request for Loratadine 10 mg for rash.  Shows as pending medication requests.  Please advise patient of status. Thank you

## 2021-07-05 DIAGNOSIS — G47.00 INSOMNIA, UNSPECIFIED TYPE: ICD-10-CM

## 2021-07-06 RX ORDER — ZOLPIDEM TARTRATE 6.25 MG/1
TABLET, FILM COATED, EXTENDED RELEASE ORAL
Qty: 90 TABLET | Refills: 0 | Status: SHIPPED | OUTPATIENT
Start: 2021-07-06 | End: 2021-10-08 | Stop reason: SDUPTHER

## 2021-08-16 DIAGNOSIS — I10 ESSENTIAL HYPERTENSION: ICD-10-CM

## 2021-08-16 RX ORDER — LISINOPRIL 40 MG/1
40 TABLET ORAL DAILY
Qty: 90 TABLET | Refills: 0 | Status: SHIPPED | OUTPATIENT
Start: 2021-08-16 | End: 2021-11-24

## 2021-08-16 RX ORDER — ATENOLOL 50 MG/1
50 TABLET ORAL DAILY
Qty: 90 TABLET | Refills: 0 | Status: SHIPPED | OUTPATIENT
Start: 2021-08-16 | End: 2021-11-10

## 2021-08-16 NOTE — TELEPHONE ENCOUNTER
Caller: Bernard Resendez    Relationship: Self    Best call back number:[phone]  Medication needed:   Requested Prescriptions     Pending Prescriptions Disp Refills   • atenolol (TENORMIN) 50 MG tablet 90 tablet 3     Sig: Take 1 tablet by mouth Daily.   • lisinopril (PRINIVIL,ZESTRIL) 40 MG tablet 90 tablet 3     Sig: Take 1 tablet by mouth Daily.         What additional details did the patient provide when requesting the medication: ASKING FOR MULTIPLE REFILLS WITH A QUANTITY OF 90    Does the patient have less than a 3 day supply:  [x] Yes  [] No    What is the patient's preferred pharmacy: [id number] [place] [address] AT [facility] [phone] [facility] [phone]

## 2021-08-20 RX ORDER — SULFASALAZINE 500 MG/1
TABLET ORAL
Qty: 180 TABLET | Refills: 11 | Status: SHIPPED | OUTPATIENT
Start: 2021-08-20 | End: 2022-09-23 | Stop reason: SDUPTHER

## 2021-09-13 ENCOUNTER — TELEPHONE (OUTPATIENT)
Dept: ORTHOPEDIC SURGERY | Facility: CLINIC | Age: 81
End: 2021-09-13

## 2021-09-13 NOTE — TELEPHONE ENCOUNTER
PT calls in wanting to speak with Dr. Gorman. PT states it is urgent matter regarding his wife. PT would not give more details and states that he would like Dr. Gorman to return patient phone call at his earliest convenience.
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
## 2021-10-08 ENCOUNTER — OFFICE VISIT (OUTPATIENT)
Dept: INTERNAL MEDICINE | Facility: CLINIC | Age: 81
End: 2021-10-08

## 2021-10-08 VITALS
HEIGHT: 71 IN | TEMPERATURE: 97.3 F | SYSTOLIC BLOOD PRESSURE: 130 MMHG | DIASTOLIC BLOOD PRESSURE: 72 MMHG | WEIGHT: 248 LBS | HEART RATE: 62 BPM | BODY MASS INDEX: 34.72 KG/M2

## 2021-10-08 DIAGNOSIS — E66.9 OBESITY, CLASS I, BMI 30-34.9: ICD-10-CM

## 2021-10-08 DIAGNOSIS — G47.00 INSOMNIA, UNSPECIFIED TYPE: ICD-10-CM

## 2021-10-08 DIAGNOSIS — Z00.00 ENCOUNTER FOR SUBSEQUENT ANNUAL WELLNESS VISIT (AWV) IN MEDICARE PATIENT: Primary | ICD-10-CM

## 2021-10-08 DIAGNOSIS — Z23 NEED FOR IMMUNIZATION AGAINST INFLUENZA: ICD-10-CM

## 2021-10-08 DIAGNOSIS — K51.90 ULCERATIVE COLITIS WITHOUT COMPLICATIONS, UNSPECIFIED LOCATION (HCC): ICD-10-CM

## 2021-10-08 DIAGNOSIS — Z66 DNR (DO NOT RESUSCITATE): ICD-10-CM

## 2021-10-08 DIAGNOSIS — N18.31 STAGE 3A CHRONIC KIDNEY DISEASE (HCC): ICD-10-CM

## 2021-10-08 DIAGNOSIS — E78.5 HYPERLIPIDEMIA, UNSPECIFIED HYPERLIPIDEMIA TYPE: ICD-10-CM

## 2021-10-08 DIAGNOSIS — I10 PRIMARY HYPERTENSION: ICD-10-CM

## 2021-10-08 PROBLEM — L29.9 PRURITUS: Status: RESOLVED | Noted: 2021-01-15 | Resolved: 2021-10-08

## 2021-10-08 PROBLEM — S30.0XXA TRAUMATIC HEMATOMA OF BUTTOCK: Status: RESOLVED | Noted: 2021-01-15 | Resolved: 2021-10-08

## 2021-10-08 PROBLEM — Z87.39 HISTORY OF GOUT: Status: ACTIVE | Noted: 2021-10-08

## 2021-10-08 PROCEDURE — G0439 PPPS, SUBSEQ VISIT: HCPCS | Performed by: FAMILY MEDICINE

## 2021-10-08 PROCEDURE — 1170F FXNL STATUS ASSESSED: CPT | Performed by: FAMILY MEDICINE

## 2021-10-08 PROCEDURE — 90662 IIV NO PRSV INCREASED AG IM: CPT | Performed by: FAMILY MEDICINE

## 2021-10-08 PROCEDURE — G0008 ADMIN INFLUENZA VIRUS VAC: HCPCS | Performed by: FAMILY MEDICINE

## 2021-10-08 PROCEDURE — 1159F MED LIST DOCD IN RCRD: CPT | Performed by: FAMILY MEDICINE

## 2021-10-08 RX ORDER — ZOLPIDEM TARTRATE 6.25 MG/1
6.25 TABLET, FILM COATED, EXTENDED RELEASE ORAL NIGHTLY PRN
Qty: 90 TABLET | Refills: 0 | Status: SHIPPED | OUTPATIENT
Start: 2021-10-08 | End: 2021-12-30

## 2021-10-08 RX ORDER — DOXEPIN HYDROCHLORIDE 10 MG/1
10 CAPSULE ORAL NIGHTLY
COMMUNITY
End: 2021-10-08

## 2021-10-08 NOTE — PROGRESS NOTES
Date of Encounter: 10/08/2021  Patient: Bernard Resendez,  1940    SUBSEQUENT MEDICARE WELLNESS VISIT  Subjective   History of Presenting Illness  Chief complaint: Medicare wellness    No acute concerns.    Hypertension, controlled on current regimen with no side effects.    Hyperlipidemia, came off of statin due to improvement with exercise but he has not been active recently.    Ulcerative colitis with no recent flares, stable on sulfasalazine.    CKD 3a, following.  Usually avoids over-the-counter NSAIDs.    Insomnia, continues to be well controlled with zolpidem.  Has tried melatonin, doxepin without side effects.  Good sleep hygiene.  Would like to continue.      Lives with wife who has been ill recently.  Prior less than 10-pack-year smoker quit at age 48.  2 alcoholic drinks per week.  Does not exercise.  Lactose intolerant, trying to make dietary changes for weight loss.  Last colonoscopy  with 2-year follow-up. Retired restaurant//owner.  Former .  Amenable to influenza vaccination today, has not gotten his COVID-19 booster yet.  Has a living will not on file.  Discussed CODE STATUS and he would like to be DNR, he is concerned because his wife would not want him to be DNR.     Pain  In the past 7 days, how much pain have you felt? None    Review of Systems:  Negative for fever, congestion, chest pain upon exertion, shortness of breath, vision changes, vomiting, dysuria, lymphadenopathy, muscle weakness, numbness, mood changes, rashes.    Health Risk Assessment:    Recent Hospitalizations:  No hospitalization(s) within the last year.    Current Medical Providers:  Patient Care Team:  Ector Sykes MD as PCP - General (Family Medicine)    Smoking Status:  Social History     Tobacco Use   Smoking Status Former Smoker   • Packs/day: 0.25   • Years: 20.00   • Pack years: 5.00   • Types: Cigarettes   • Quit date:    • Years since quittin.7   Smokeless Tobacco Former  User       Alcohol Consumption:  Social History     Substance and Sexual Activity   Alcohol Use Yes   • Alcohol/week: 2.0 standard drinks   • Types: 2 Shots of liquor per week    Comment: socially       Depression Screen:   PHQ-2/PHQ-9 Depression Screening 10/8/2021   Little interest or pleasure in doing things 0   Feeling down, depressed, or hopeless 0   Trouble falling or staying asleep, or sleeping too much -   Feeling tired or having little energy -   Poor appetite or overeating -   Feeling bad about yourself - or that you are a failure or have let yourself or your family down -   Trouble concentrating on things, such as reading the newspaper or watching television -   Moving or speaking so slowly that other people could have noticed. Or the opposite - being so fidgety or restless that you have been moving around a lot more than usual -   Thoughts that you would be better off dead, or of hurting yourself in some way -   Total Score 0   If you checked off any problems, how difficult have these problems made it for you to do your work, take care of things at home, or get along with other people? -     I spent more than 16 minutes asking patient questions, counseling and documenting in the chart    Fall Risk Screen:  DAVID Fall Risk Assessment was completed, and patient is at MODERATE risk for falls. Assessment completed on:10/8/2021    Health Habits and Functional and Cognitive Screening:  Functional & Cognitive Status 10/8/2021   Do you have difficulty preparing food and eating? No   Do you have difficulty bathing yourself, getting dressed or grooming yourself? No   Do you have difficulty using the toilet? No   Do you have difficulty moving around from place to place? No   Do you have trouble with steps or getting out of a bed or a chair? No   Current Diet -   Dental Exam -   Eye Exam -   Exercise (times per week) -   Current Exercise Activities Include -   Do you need help using the phone?  No   Are you deaf or  do you have serious difficulty hearing?  No   Do you need help with transportation? No   Do you need help shopping? No   Do you need help preparing meals?  No   Do you need help with housework?  No   Do you need help with laundry? No   Do you need help taking your medications? No   Do you need help managing money? No   Do you ever drive or ride in a car without wearing a seat belt? No   Have you felt unusual stress, anger or loneliness in the last month? -   Who do you live with? -   If you need help, do you have trouble finding someone available to you? -   Have you been bothered in the last four weeks by sexual problems? -   Do you have difficulty concentrating, remembering or making decisions? No       Does the patient have evidence of cognitive impairment? No    Aspirin use counseling:Does not need ASA (and currently is not on it)    Recent Lab Results:        Age-appropriate Screening Schedule:  Refer to the list below for future screening recommendations based on patient's age, sex and/or medical conditions. Orders for these recommended tests are listed in the plan section. The patient has been provided with a written plan.    Health Maintenance   Topic Date Due   • TDAP/TD VACCINES (1 - Tdap) Never done   • INFLUENZA VACCINE  08/01/2021   • LIPID PANEL  03/30/2022   • ZOSTER VACCINE  Completed   • DXA SCAN  Discontinued       Compared to one year ago, the patient feels his physical health is the same.  Compared to one year ago, the patient feels his mental health is the same.    Reviewed chart for potential of high risk medication in the elderly: yes  Reviewed chart for potential of harmful drug interactions in the elderly:yes    Advanced Care Planning:  Advance Care Planning   ACP discussion was held with the patient during this visit. Patient has an advance directive (not in EMR), copy requested. Also provided him with a DNR order form and recommended he discuss this with his wife again.    The following  portions of the patient's history were reviewed and updated as appropriate: allergies, current medications, past family history, past medical history, past social history, past surgical history and problem list.    Patient Active Problem List   Diagnosis   • Chronic kidney disease, stage III (moderate) (HCC)   • Hyperlipidemia   • Hypogonadism in male   • Insomnia d/t anxiety   • Osteopenia   • Hypertension   • Colon polyps   • Ulcerative colitis without complications (HCC)   • Venous stasis dermatitis of both lower extremities   • Obesity, Class I, BMI 30-34.9   • DNR (do not resuscitate)   • History of gout     Past Medical History:   Diagnosis Date   • Acute renal failure (HCC)    • Cancer (HCC)     SKIN LESION REMOVED   • Diverticulosis    • Gout 2/13/2016   • Hearing difficulty    • History of shingles    • Hyperlipidemia    • Hypertension    • Hypogonadism in male    • Insomnia    • Nephrolithiasis    • Osteopenia    • Osteopenia    • Pruritus 1/15/2021   • Renal insufficiency    • Ulcerative colitis (HCC)      Past Surgical History:   Procedure Laterality Date   • CIRCUMCISION N/A 6/16/2021    Procedure: CIRCUMCISION;  Surgeon: Milton Forrest MD;  Location: [facility] MAIN OR;  Service: Urology;  Laterality: N/A;   • COLONOSCOPY  06/11/2015    NBIH, actuve colitis w/cryptitis, rare crypt abscess & superficial erosion, hyperplastic polyp   • COLONOSCOPY N/A 8/8/2017    IH, nodular mucosa in the proximal rectum and in the distal rectum, Diverticulosis in the sigmoid colon, the descending colon and at the splenic flexure.  PATH: Hyperplastic polyps    • COLONOSCOPY N/A 11/19/2019    Procedure: COLONOSCOPY to cecum with biopsies;  Surgeon: Calvin Gifford MD;  Location: [facility] ENDOSCOPY;  Service: Gastroenterology   • EYE SURGERY      cataract   • KIDNEY STONE SURGERY     • TUMOR EXCISION      rectal tumor     Family History   Problem Relation Age of Onset   • Hypertension Mother    • Stroke Mother    • Heart  "disease Mother    • Asthma Sister    • Ulcerative colitis Sister    • Emphysema Sister    • Lung disease Father         mesothelioma   • Glaucoma Father    • Malig Hyperthermia Neg Hx        Current Outpatient Medications:   •  atenolol (TENORMIN) 50 MG tablet, Take 1 tablet by mouth Daily., Disp: 90 tablet, Rfl: 0  •  Cholecalciferol (VITAMIN D3) 1000 UNITS capsule, Take 1,000 Units by mouth Daily., Disp: , Rfl:   •  lisinopril (PRINIVIL,ZESTRIL) 40 MG tablet, Take 1 tablet by mouth Daily., Disp: 90 tablet, Rfl: 0  •  Multiple Vitamins-Minerals (CENTRUM SILVER ADULT 50+ PO), Take 1 tablet by mouth Daily. HOLD PER MD INSTR, Disp: , Rfl:   •  Omega-3 Fatty Acids (FISH OIL) 1200 MG capsule capsule, Take 1,200 mg by mouth Daily With Breakfast. HOLD PER MD INSTR, Disp: , Rfl:   •  sulfaSALAzine (AZULFIDINE) 500 MG tablet, TAKE TWO TABLETS BY MOUTH THREE TIMES A DAY, Disp: 180 tablet, Rfl: 11  •  vitamin C (ASCORBIC ACID) 500 MG tablet, Take 1,000 mg by mouth Daily. HOLD PER MD INSTR, Disp: , Rfl:   •  zolpidem CR (AMBIEN CR) 6.25 MG CR tablet, Take 1 tablet by mouth At Night As Needed for Sleep., Disp: 90 tablet, Rfl: 0  Allergies   Allergen Reactions   • Ciprofloxacin Rash     Pt unsure reaction     Social History     Tobacco Use   • Smoking status: Former Smoker     Packs/day: 0.25     Years: 20.00     Pack years: 5.00     Types: Cigarettes     Quit date:      Years since quittin.7   • Smokeless tobacco: Former User   Vaping Use   • Vaping Use: Never used   Substance Use Topics   • Alcohol use: Yes     Alcohol/week: 2.0 standard drinks     Types: 2 Shots of liquor per week     Comment: socially   • Drug use: No          Objective   Physical Exam  Vitals:    10/08/21 1435   BP: 130/72   Pulse: 62   Temp: 97.3 °F (36.3 °C)   TempSrc: Temporal   Weight: 112 kg (248 lb)   Height: 180.3 cm (71\")     Body mass index is 34.59 kg/m².  Discussed the patient's BMI with him. The BMI Is above average, discussed regular " exercise both for functional and strengthening benefit as well as weight loss.    Constitutional: NAD.  Eyes: EOMI. PERRLA. Normal conjunctiva.  Ear, nose, mouth, throat: Normal external ear canals and TMs bilaterally.  Cardiovascular: RRR. No murmurs. No LE edema b/l. Radial pulses 2+ bilaterally.  Pulmonary: CTA b/l. Good effort.  Integumentary: No lacerations or wounds on face or upper extremities.  Lymphatic: No anterior cervical lymphadenopathy.  Endocrine: No thyromegaly or palpable thyroid nodules.  Psychiatric: Normal affect. Normal thought content.  Gastrointestinal: Limited by habitus but nondistended. No hepatosplenomegaly. No focal tenderness to palpation.     Assessment/Plan   Assessment and Plan  Pleasant 81-year-old male with ulcerative colitis, hypertension, hyperlipidemia, hypogonadism, osteopenia, CKD 3, insomnia, history of gout, who presents with the following:    Advance Directive Discussion  Immunizations Discussed/Encouraged (specific immunizations; Influenza )  Inactivity/Sedentary  Obesity/Overweight     The above risks/problems have been discussed with the patient.  Pertinent information has been shared with the patient in the After Visit Summary.  Other plans as below:    Diagnoses and all orders for this visit:    1. Encounter for subsequent annual wellness visit (AWV) in Medicare patient (Primary): We discussed preventative care including age and patient-appropriate immunizations and cancer screening. We also discussed the importance of exercise and a healthy diet. This is their annual preventative exam.    2. Primary hypertension: Controlled    3. Hyperlipidemia, unspecified hyperlipidemia type  -     Lipid Panel  -     Thyroid Panel With TSH    4. Ulcerative colitis without complications, unspecified location (HCC): Controlled    5. Stage 3a chronic kidney disease (HCC): Stable, continue to avoid over-the-counter NSAIDs  -     Comprehensive Metabolic Panel  -     CBC & Differential  -      Urinalysis With Microscopic - Urine, Clean Catch  -     Vitamin D 25 Hydroxy    6. Insomnia, unspecified type: Reviewed Raudel which is appropriate.  Patient understands risks and benefits of this medication, using low dose and doing well.  Okay to continue.  Okay to fill until follow-up in 6 months.  -     zolpidem CR (AMBIEN CR) 6.25 MG CR tablet; Take 1 tablet by mouth At Night As Needed for Sleep.  Dispense: 90 tablet; Refill: 0    7. Obesity, Class I, BMI 30-34.9: Discussed exercise for weight loss    8. DNR (do not resuscitate): Provided him with a [place] DNR order form I recommended he discuss this with his wife    9. Need for immunization against influenza  -     Fluzone High-Dose 65+yrs ([id number])    Follow Up:  Follow-up in 6 months for Ambien refill    An After Visit Summary and PPPS were given to the patient.      Ector Sykes MD  Family Medicine  O: [phone]  C: [phone]    Disclaimer: Parts of this note were dictated by speech recognition. Minor errors in transcription may be present. Please call if questions.

## 2021-10-09 LAB
25(OH)D3+25(OH)D2 SERPL-MCNC: 35.7 NG/ML (ref 30–100)
ALBUMIN SERPL-MCNC: 3.9 G/DL (ref 3.6–4.6)
ALBUMIN/GLOB SERPL: 1.5 {RATIO} (ref 1.2–2.2)
ALP SERPL-CCNC: 73 IU/L (ref 44–121)
ALT SERPL-CCNC: 14 IU/L (ref 0–44)
APPEARANCE UR: CLEAR
AST SERPL-CCNC: 18 IU/L (ref 0–40)
BACTERIA #/AREA URNS HPF: NORMAL /[HPF]
BASOPHILS # BLD AUTO: 0.1 X10E3/UL (ref 0–0.2)
BASOPHILS NFR BLD AUTO: 1 %
BILIRUB SERPL-MCNC: 0.3 MG/DL (ref 0–1.2)
BILIRUB UR QL STRIP: NEGATIVE
BUN SERPL-MCNC: 21 MG/DL (ref 8–27)
BUN/CREAT SERPL: 17 (ref 10–24)
CALCIUM SERPL-MCNC: 9.4 MG/DL (ref 8.6–10.2)
CASTS URNS QL MICRO: NORMAL /LPF
CHLORIDE SERPL-SCNC: 104 MMOL/L (ref 96–106)
CHOLEST SERPL-MCNC: 267 MG/DL (ref 100–199)
CO2 SERPL-SCNC: 24 MMOL/L (ref 20–29)
COLOR UR: YELLOW
CREAT SERPL-MCNC: 1.21 MG/DL (ref 0.76–1.27)
EOSINOPHIL # BLD AUTO: 0.2 X10E3/UL (ref 0–0.4)
EOSINOPHIL NFR BLD AUTO: 2 %
EPI CELLS #/AREA URNS HPF: NORMAL /HPF (ref 0–10)
ERYTHROCYTE [DISTWIDTH] IN BLOOD BY AUTOMATED COUNT: 13.2 % (ref 11.6–15.4)
FT4I SERPL CALC-MCNC: 2 (ref 1.2–4.9)
GLOBULIN SER CALC-MCNC: 2.6 G/DL (ref 1.5–4.5)
GLUCOSE SERPL-MCNC: 113 MG/DL (ref 65–99)
GLUCOSE UR QL: NEGATIVE
HCT VFR BLD AUTO: 43.4 % (ref 37.5–51)
HDLC SERPL-MCNC: 43 MG/DL
HGB BLD-MCNC: 14.3 G/DL (ref 13–17.7)
HGB UR QL STRIP: NEGATIVE
IMM GRANULOCYTES # BLD AUTO: 0 X10E3/UL (ref 0–0.1)
IMM GRANULOCYTES NFR BLD AUTO: 1 %
KETONES UR QL STRIP: NEGATIVE
LDLC SERPL CALC-MCNC: 190 MG/DL (ref 0–99)
LEUKOCYTE ESTERASE UR QL STRIP: NEGATIVE
LYMPHOCYTES # BLD AUTO: 2.1 X10E3/UL (ref 0.7–3.1)
LYMPHOCYTES NFR BLD AUTO: 25 %
MCH RBC QN AUTO: 29.9 PG (ref 26.6–33)
MCHC RBC AUTO-ENTMCNC: 32.9 G/DL (ref 31.5–35.7)
MCV RBC AUTO: 91 FL (ref 79–97)
MICRO URNS: NORMAL
MICRO URNS: NORMAL
MONOCYTES # BLD AUTO: 0.7 X10E3/UL (ref 0.1–0.9)
MONOCYTES NFR BLD AUTO: 8 %
NEUTROPHILS # BLD AUTO: 5.4 X10E3/UL (ref 1.4–7)
NEUTROPHILS NFR BLD AUTO: 63 %
NITRITE UR QL STRIP: NEGATIVE
PH UR STRIP: 5.5 [PH] (ref 5–7.5)
PLATELET # BLD AUTO: 223 X10E3/UL (ref 150–450)
POTASSIUM SERPL-SCNC: 4.9 MMOL/L (ref 3.5–5.2)
PROT SERPL-MCNC: 6.5 G/DL (ref 6–8.5)
PROT UR QL STRIP: NEGATIVE
RBC # BLD AUTO: 4.79 X10E6/UL (ref 4.14–5.8)
RBC #/AREA URNS HPF: NORMAL /HPF (ref 0–2)
SODIUM SERPL-SCNC: 144 MMOL/L (ref 134–144)
SP GR UR: 1.02 (ref 1–1.03)
T3RU NFR SERPL: 28 % (ref 24–39)
T4 SERPL-MCNC: 7.1 UG/DL (ref 4.5–12)
TRIGL SERPL-MCNC: 180 MG/DL (ref 0–149)
TSH SERPL DL<=0.005 MIU/L-ACNC: 4.26 UIU/ML (ref 0.45–4.5)
UROBILINOGEN UR STRIP-MCNC: 0.2 MG/DL (ref 0.2–1)
VLDLC SERPL CALC-MCNC: 34 MG/DL (ref 5–40)
WBC # BLD AUTO: 8.4 X10E3/UL (ref 3.4–10.8)
WBC #/AREA URNS HPF: NORMAL /HPF (ref 0–5)

## 2021-10-11 NOTE — PROGRESS NOTES
Overall your blood work looks good with the exception that your cholesterol has worsened likely due to reduced exercise during the pandemic. Since you had such success before with your cholesterol with regular exercise and a healthy diet, I want to hold on medications for now. If it remains elevated we'll discuss whether we should restart them.

## 2021-10-18 ENCOUNTER — OFFICE VISIT (OUTPATIENT)
Dept: GASTROENTEROLOGY | Facility: CLINIC | Age: 81
End: 2021-10-18

## 2021-10-18 VITALS
BODY MASS INDEX: 34.62 KG/M2 | WEIGHT: 247.3 LBS | SYSTOLIC BLOOD PRESSURE: 172 MMHG | TEMPERATURE: 97.3 F | HEART RATE: 62 BPM | HEIGHT: 71 IN | DIASTOLIC BLOOD PRESSURE: 91 MMHG

## 2021-10-18 DIAGNOSIS — K51.311 ULCERATIVE RECTOSIGMOIDITIS WITH RECTAL BLEEDING (HCC): Primary | ICD-10-CM

## 2021-10-18 PROCEDURE — 99214 OFFICE O/P EST MOD 30 MIN: CPT | Performed by: INTERNAL MEDICINE

## 2021-10-18 NOTE — PROGRESS NOTES
CC:  Follow-up left-sided ulcerative colitis    Subjective     HPI  Bernard Resendez is a 81 y.o. male who presents today for follow up.  Left-sided ulcerative colitis  He is in complete remission with 1000 mg of sulfasalazine in the morning and at 1000 mg in the evening  He has no rectal bleeding abdominal pain diarrhea  He takes no steroids  We have never had to step up therapy to a biologic or immunomodulators  He will be due for colonoscopy next November 2022  Recent CBC and CMP were within normal limits    Objective   Vitals:    10/18/21 1515   BP: 172/91   Pulse: 62   Temp: 97.3 °F (36.3 °C)       Physical Exam  Vitals reviewed.   Constitutional:       Appearance: He is well-developed.   HENT:      Head: Normocephalic and atraumatic.   Abdominal:      General: Bowel sounds are normal. There is no distension.      Palpations: Abdomen is soft. There is no mass.      Tenderness: There is no abdominal tenderness.      Hernia: No hernia is present.   Skin:     General: Skin is warm and dry.   Neurological:      Mental Status: He is alert and oriented to person, place, and time.   Psychiatric:         Behavior: Behavior normal.         Thought Content: Thought content normal.         Judgment: Judgment normal.       The following data was reviewed by: Calvin Gifford MD on 10/18/2021:  Common labs    Common Labsle 3/30/21 3/30/21 6/11/21 6/11/21 10/8/21 10/8/21 10/8/21    1332 1332 1310 1310 1515 1515 1515   Glucose    79      Glucose 101 (A)    113 (A)     BUN 24   23 21     Creatinine 1.20   1.08 1.21     eGFR Non  Am 56 (A)   65 55 (A)     eGFR  Am 65    64     Sodium 141   142 144     Potassium 5.3 (A)   4.9 4.9     Chloride 104   107 104     Calcium 9.2   9.2 9.4     Total Protein 6.6    6.5     Albumin 3.9    3.9     Total Bilirubin 0.4    0.3     Alkaline Phosphatase 77    73     AST (SGOT) 18    18     ALT (SGPT) 12    14     WBC   7.64    8.4   Hemoglobin   13.5    14.3   Hematocrit   41.7     43.4   Platelets   215    223   Total Cholesterol  226 (A)    267 (A)    Triglycerides  126    180 (A)    HDL Cholesterol  52    43    LDL Cholesterol   151 (A)    190 (A)    (A) Abnormal value       Comments are available for some flowsheets but are not being displayed.           CMP    CMP 3/30/21 6/11/21 10/8/21   Glucose  79    Glucose 101 (A)  113 (A)   BUN 24 23 21   Creatinine 1.20 1.08 1.21   eGFR Non  Am 56 (A) 65 55 (A)   eGFR  Am 65  64   Sodium 141 142 144   Potassium 5.3 (A) 4.9 4.9   Chloride 104 107 104   Calcium 9.2 9.2 9.4   Total Protein 6.6  6.5   Albumin 3.9  3.9   Globulin 2.7  2.6   Total Bilirubin 0.4  0.3   Alkaline Phosphatase 77  73   AST (SGOT) 18  18   ALT (SGPT) 12  14   (A) Abnormal value       Comments are available for some flowsheets but are not being displayed.           CBC    CBC 6/11/21 10/8/21   WBC 7.64 8.4   RBC 4.55 4.79   Hemoglobin 13.5 14.3   Hematocrit 41.7 43.4   MCV 91.6 91   MCH 29.7 29.9   MCHC 32.4 32.9   RDW 13.1 13.2   Platelets 215 223           CBC w/diff    CBC w/Diff 6/11/21 10/8/21   WBC 7.64 8.4   RBC 4.55 4.79   Hemoglobin 13.5 14.3   Hematocrit 41.7 43.4   MCV 91.6 91   MCH 29.7 29.9   MCHC 32.4 32.9   RDW 13.1 13.2   Platelets 215 223   Neutrophil Rel %  63   Lymphocyte Rel %  25   Monocyte Rel %  8   Eosinophil Rel %  2   Basophil Rel %  1           Lipid Panel    Lipid Panel 3/30/21 10/8/21   Total Cholesterol 226 (A) 267 (A)   Triglycerides 126 180 (A)   HDL Cholesterol 52 43   VLDL Cholesterol 23 34   LDL Cholesterol  151 (A) 190 (A)   (A) Abnormal value            TSH    TSH 3/30/21 10/8/21   TSH 3.370 4.260           Electrolytes    Electrolytes 3/30/21 6/11/21 10/8/21   Sodium 141 142 144   Potassium 5.3 (A) 4.9 4.9   Chloride 104 107 104   Calcium 9.2 9.2 9.4   (A) Abnormal value            Renal Profile    Renal Profile 3/30/21 6/11/21 10/8/21   BUN 24 23 21   Creatinine 1.20 1.08 1.21   eGFR Non  Am 56 (A) 65 55 (A)   eGFR   Am 65  64   (A) Abnormal value       Comments are available for some flowsheets but are not being displayed.           Data reviewed: GI studies Colonoscopy last year showing no colitis, patient in remission no evidence of dysplasia     Assessment/Plan   Assessment:     Left-sided ulcerative colitis, currently in remission on 2 g of sulfasalazine a day      Plan:     Continue sulfasalazine at the current dose  CBC and CMP were normal 2 weeks ago they do not need to be repeated  We will move his colonoscopy to November 2022 for surveillance      I spent 30 minutes caring for Bernard on this date of service. This time includes time spent by me in the following activities:preparing for the visit, reviewing tests, obtaining and/or reviewing a separately obtained history, performing a medically appropriate examination and/or evaluation , counseling and educating the patient/family/caregiver, ordering medications, tests, or procedures, referring and communicating with other health care professionals , documenting information in the medical record, independently interpreting results and communicating that information with the patient/family/caregiver and care coordination    Calvin Gifford MD  [facility]  [address] [place] [address]  Office: [phone]

## 2021-11-10 DIAGNOSIS — I10 ESSENTIAL HYPERTENSION: ICD-10-CM

## 2021-11-10 RX ORDER — ATENOLOL 50 MG/1
TABLET ORAL
Qty: 90 TABLET | Refills: 0 | Status: SHIPPED | OUTPATIENT
Start: 2021-11-10 | End: 2022-02-21

## 2021-11-24 DIAGNOSIS — I10 ESSENTIAL HYPERTENSION: ICD-10-CM

## 2021-11-24 RX ORDER — LISINOPRIL 40 MG/1
TABLET ORAL
Qty: 90 TABLET | Refills: 0 | Status: SHIPPED | OUTPATIENT
Start: 2021-11-24 | End: 2022-02-21

## 2021-12-29 DIAGNOSIS — G47.00 INSOMNIA, UNSPECIFIED TYPE: ICD-10-CM

## 2021-12-30 RX ORDER — ZOLPIDEM TARTRATE 6.25 MG/1
TABLET, FILM COATED, EXTENDED RELEASE ORAL
Qty: 90 TABLET | Refills: 0 | Status: SHIPPED | OUTPATIENT
Start: 2021-12-30 | End: 2022-03-14

## 2021-12-30 NOTE — TELEPHONE ENCOUNTER
Rx Refill Note  Requested Prescriptions     Pending Prescriptions Disp Refills   • zolpidem CR (AMBIEN CR) 6.25 MG CR tablet [Pharmacy Med Name: ZOLPIDEM TART ER 6.25 MG TAB] 90 tablet 0     Sig: TAKE ONE TABLET BY MOUTH ONCE NIGHTLY AS NEEDED FOR SLEEP      Last office visit with prescribing clinician: 10/8/2021      Next office visit with prescribing clinician: 4/7/2022     Contract Not On File   Tox[employer] Select 13 (MW) - Urine, Clean Catch (03/30/2021 13:32)      Charissa Medrano LPN  12/30/21, 13:56 EST

## 2022-01-14 ENCOUNTER — OFFICE VISIT (OUTPATIENT)
Dept: INTERNAL MEDICINE | Facility: CLINIC | Age: 82
End: 2022-01-14

## 2022-01-14 VITALS
OXYGEN SATURATION: 95 % | DIASTOLIC BLOOD PRESSURE: 78 MMHG | HEIGHT: 71 IN | BODY MASS INDEX: 34.47 KG/M2 | HEART RATE: 70 BPM | WEIGHT: 246.2 LBS | SYSTOLIC BLOOD PRESSURE: 119 MMHG | TEMPERATURE: 97.8 F

## 2022-01-14 DIAGNOSIS — U07.1 COVID-19 VIRUS INFECTION: Primary | ICD-10-CM

## 2022-01-14 DIAGNOSIS — E78.5 HYPERLIPIDEMIA, UNSPECIFIED HYPERLIPIDEMIA TYPE: ICD-10-CM

## 2022-01-14 DIAGNOSIS — I87.2 VENOUS STASIS DERMATITIS OF BOTH LOWER EXTREMITIES: ICD-10-CM

## 2022-01-14 PROCEDURE — 99214 OFFICE O/P EST MOD 30 MIN: CPT | Performed by: FAMILY MEDICINE

## 2022-01-14 RX ORDER — ATORVASTATIN CALCIUM 10 MG/1
TABLET, FILM COATED ORAL
Qty: 90 TABLET | Refills: 3 | Status: SHIPPED | OUTPATIENT
Start: 2022-01-14 | End: 2022-01-29 | Stop reason: HOSPADM

## 2022-01-14 NOTE — PROGRESS NOTES
Date of Encounter: 2022  Patient: Bernard Resendez,  1940    Subjective   History of Presenting Illness  Chief complaint: Follow-up COVID-19    Patient tested positive for COVID in [place]  while visiting family. Symptoms included sore throat, headache, fatigue, intermittent cough.  At the time of this appointment his symptoms have completely resolved except for some residual fatigue.  He has no loss of taste or smell.  Denies shortness of breath and chest pain.  He has not had his booster vaccination.    Hyperlipidemia, has been on atorvastatin in the past but was taken off of it but recent LDL cholesterol greater than 190.    Venous stasis dermatitis of both lower extremities has improved with regular exercise.  Not currently using compression stockings.    Would like to continue zolpidem for insomnia.    Review of Systems:  Negative for fever, chest pain, shortness of breath    The following portions of the patient's history were reviewed and updated as appropriate: allergies, current medications, past family history, past medical history, past social history, past surgical history and problem list.    Patient Active Problem List   Diagnosis   • Chronic kidney disease, stage III (moderate) (HCC)   • Hyperlipidemia   • Hypogonadism in male   • Insomnia d/t anxiety   • Osteopenia   • Hypertension   • Colon polyps   • Ulcerative colitis without complications (HCC)   • Venous stasis dermatitis of both lower extremities   • Obesity, Class I, BMI 30-34.9   • DNR (do not resuscitate)   • History of gout     Past Medical History:   Diagnosis Date   • Acute renal failure (HCC)    • Cancer (HCC)     SKIN LESION REMOVED   • Diverticulosis    • Gout 2016   • Hearing difficulty    • History of shingles    • Hyperlipidemia    • Hypertension    • Hypogonadism in male    • Insomnia    • Nephrolithiasis    • Osteopenia    • Osteopenia    • Pruritus 1/15/2021   • Renal insufficiency    • Ulcerative colitis (HCC)       Past Surgical History:   Procedure Laterality Date   • CIRCUMCISION N/A 6/16/2021    Procedure: CIRCUMCISION;  Surgeon: Milton Forrest MD;  Location: [facility] MAIN OR;  Service: Urology;  Laterality: N/A;   • COLONOSCOPY  06/11/2015    NBIH, actuve colitis w/cryptitis, rare crypt abscess & superficial erosion, hyperplastic polyp   • COLONOSCOPY N/A 8/8/2017    IH, nodular mucosa in the proximal rectum and in the distal rectum, Diverticulosis in the sigmoid colon, the descending colon and at the splenic flexure.  PATH: Hyperplastic polyps    • COLONOSCOPY N/A 11/19/2019    Procedure: COLONOSCOPY to cecum with biopsies;  Surgeon: Calvin Gifford MD;  Location: [facility] ENDOSCOPY;  Service: Gastroenterology   • EYE SURGERY      cataract   • KIDNEY STONE SURGERY     • TUMOR EXCISION      rectal tumor     Family History   Problem Relation Age of Onset   • Hypertension Mother    • Stroke Mother    • Heart disease Mother    • Asthma Sister    • Ulcerative colitis Sister    • Emphysema Sister    • Lung disease Father         mesothelioma   • Glaucoma Father    • Malig Hyperthermia Neg Hx        Current Outpatient Medications:   •  atenolol (TENORMIN) 50 MG tablet, TAKE ONE TABLET BY MOUTH DAILY, Disp: 90 tablet, Rfl: 0  •  Cholecalciferol (VITAMIN D3) 1000 UNITS capsule, Take 1,000 Units by mouth Daily., Disp: , Rfl:   •  Cyanocobalamin (VITAMIN B12 PO), Take 1,000 mg by mouth Daily., Disp: , Rfl:   •  lisinopril (PRINIVIL,ZESTRIL) 40 MG tablet, TAKE ONE TABLET BY MOUTH DAILY, Disp: 90 tablet, Rfl: 0  •  Multiple Vitamins-Minerals (CENTRUM SILVER ADULT 50+ PO), Take 1 tablet by mouth Daily. HOLD PER MD INSTR, Disp: , Rfl:   •  Omega-3 Fatty Acids (FISH OIL) 1200 MG capsule capsule, Take 1,200 mg by mouth Daily With Breakfast. HOLD PER MD INSTR, Disp: , Rfl:   •  sulfaSALAzine (AZULFIDINE) 500 MG tablet, TAKE TWO TABLETS BY MOUTH THREE TIMES A DAY, Disp: 180 tablet, Rfl: 11  •  vitamin C (ASCORBIC ACID) 500 MG tablet,  "Take 1,000 mg by mouth Daily. HOLD PER MD INSTR, Disp: , Rfl:   •  zolpidem CR (AMBIEN CR) 6.25 MG CR tablet, TAKE ONE TABLET BY MOUTH ONCE NIGHTLY AS NEEDED FOR SLEEP, Disp: 90 tablet, Rfl: 0  •  atorvastatin (LIPITOR) 10 MG tablet, Take 1 tab PO QD for cholesterol and heart health, Disp: 90 tablet, Rfl: 3  Allergies   Allergen Reactions   • Ciprofloxacin Rash     Pt unsure reaction     Social History     Tobacco Use   • Smoking status: Former Smoker     Packs/day: 0.25     Years: 20.00     Pack years: 5.00     Types: Cigarettes     Quit date:      Years since quittin.0   • Smokeless tobacco: Former User   Vaping Use   • Vaping Use: Never used   Substance Use Topics   • Alcohol use: Yes     Alcohol/week: 2.0 standard drinks     Types: 2 Shots of liquor per week     Comment: socially   • Drug use: No          Objective   Physical Exam  Vitals:    22 1303   BP: 119/78   Pulse: 70   Temp: 97.8 °F (36.6 °C)   TempSrc: Temporal   SpO2: 95%   Weight: 112 kg (246 lb 3.2 oz)   Height: 180.3 cm (71\")     Body mass index is 34.34 kg/m².    Constitutional: NAD.  Eyes: EOMI. PERRLA. Normal conjunctiva.  Ear, nose, mouth, throat: No tonsillar exudates or erythema.   Normal nasal mucosa. Normal external ear canals and TMs bilaterally.  Cardiovascular: RRR. No murmurs.  Trace nonpitting LE edema of the ankles. Radial pulses 2+ bilaterally.  Pulmonary: CTA b/l. Good effort.  Integumentary: No rashes or wounds on face or upper extremities.  Lymphatic: No anterior cervical lymphadenopathy.  Endocrine: No thyromegaly or palpable thyroid nodules.  Psychiatric: Normal affect. Normal thought content.     Assessment/Plan   Assessment and Plan  Pleasant 81-year-old male with ulcerative colitis, hypertension, hyperlipidemia, hypogonadism, osteopenia, CKD 3, insomnia, history of gout, who presents with the following:    Diagnoses and all orders for this visit:    1. COVID-19 virus infection (Primary): He has recovered " completely with no evidence of persistent symptoms besides fatigue which is improving    2. Hyperlipidemia, unspecified hyperlipidemia type: Limited data on the risks and benefits of statins in his age group but his LDL cholesterol is greater than 190 on most recent check.  I discussed these uncertainties and potential benefits with the patient.  He would like to try low-dose atorvastatin, if any side effects he will stop it.  -     atorvastatin (LIPITOR) 10 MG tablet; Take 1 tab PO QD for cholesterol and heart health  Dispense: 90 tablet; Refill: 3    3. Venous stasis dermatitis of both lower extremities: Improving with regular exercise    We will have him back in 4 months for recheck and labs      Ector Sykes MD  Family Medicine  O: [phone]  C: [phone]    Disclaimer: Parts of this note were dictated by speech recognition. Minor errors in transcription may be present. Please call if questions.

## 2022-01-25 ENCOUNTER — TELEPHONE (OUTPATIENT)
Dept: INTERNAL MEDICINE | Facility: CLINIC | Age: 82
End: 2022-01-25

## 2022-01-25 ENCOUNTER — APPOINTMENT (OUTPATIENT)
Dept: CT IMAGING | Facility: HOSPITAL | Age: 82
End: 2022-01-25

## 2022-01-25 ENCOUNTER — HOSPITAL ENCOUNTER (INPATIENT)
Facility: HOSPITAL | Age: 82
LOS: 2 days | Discharge: HOME | End: 2022-01-29
Attending: EMERGENCY MEDICINE | Admitting: INTERNAL MEDICINE

## 2022-01-25 DIAGNOSIS — G93.89 BRAIN MASS: Primary | ICD-10-CM

## 2022-01-25 LAB
ALBUMIN SERPL-MCNC: 3.9 G/DL (ref 3.5–5.2)
ALBUMIN/GLOB SERPL: 1.3 G/DL
ALP SERPL-CCNC: 79 U/L (ref 39–117)
ALT SERPL W P-5'-P-CCNC: 19 U/L (ref 1–41)
ANION GAP SERPL CALCULATED.3IONS-SCNC: 10 MMOL/L (ref 5–15)
AST SERPL-CCNC: 29 U/L (ref 1–40)
BASOPHILS # BLD AUTO: 0.03 10*3/MM3 (ref 0–0.2)
BASOPHILS NFR BLD AUTO: 0.4 % (ref 0–1.5)
BILIRUB SERPL-MCNC: 0.2 MG/DL (ref 0–1.2)
BUN SERPL-MCNC: 18 MG/DL (ref 8–23)
BUN/CREAT SERPL: 16.5 (ref 7–25)
CALCIUM SPEC-SCNC: 8.7 MG/DL (ref 8.6–10.5)
CHLORIDE SERPL-SCNC: 108 MMOL/L (ref 98–107)
CO2 SERPL-SCNC: 24 MMOL/L (ref 22–29)
CREAT SERPL-MCNC: 1.09 MG/DL (ref 0.76–1.27)
DEPRECATED RDW RBC AUTO: 44 FL (ref 37–54)
EOSINOPHIL # BLD AUTO: 0.21 10*3/MM3 (ref 0–0.4)
EOSINOPHIL NFR BLD AUTO: 3.1 % (ref 0.3–6.2)
ERYTHROCYTE [DISTWIDTH] IN BLOOD BY AUTOMATED COUNT: 13.8 % (ref 12.3–15.4)
GFR SERPL CREATININE-BSD FRML MDRD: 65 ML/MIN/1.73
GLOBULIN UR ELPH-MCNC: 2.9 GM/DL
GLUCOSE BLDC GLUCOMTR-MCNC: 90 MG/DL (ref 70–130)
GLUCOSE SERPL-MCNC: 138 MG/DL (ref 65–99)
HCT VFR BLD AUTO: 41 % (ref 37.5–51)
HGB BLD-MCNC: 13.5 G/DL (ref 13–17.7)
LYMPHOCYTES # BLD AUTO: 1.56 10*3/MM3 (ref 0.7–3.1)
LYMPHOCYTES NFR BLD AUTO: 23.2 % (ref 19.6–45.3)
MCH RBC QN AUTO: 29 PG (ref 26.6–33)
MCHC RBC AUTO-ENTMCNC: 32.9 G/DL (ref 31.5–35.7)
MCV RBC AUTO: 88.2 FL (ref 79–97)
MONOCYTES # BLD AUTO: 0.61 10*3/MM3 (ref 0.1–0.9)
MONOCYTES NFR BLD AUTO: 9.1 % (ref 5–12)
NEUTROPHILS NFR BLD AUTO: 4.27 10*3/MM3 (ref 1.7–7)
NEUTROPHILS NFR BLD AUTO: 63.8 % (ref 42.7–76)
PLAT MORPH BLD: NORMAL
PLATELET # BLD AUTO: 116 10*3/MM3 (ref 140–450)
PMV BLD AUTO: 10.5 FL (ref 6–12)
POTASSIUM SERPL-SCNC: 4.5 MMOL/L (ref 3.5–5.2)
PROT SERPL-MCNC: 6.8 G/DL (ref 6–8.5)
QT INTERVAL: 396 MS
RBC # BLD AUTO: 4.65 10*6/MM3 (ref 4.14–5.8)
RBC MORPH BLD: NORMAL
SARS-COV-2 ORF1AB RESP QL NAA+PROBE: DETECTED
SODIUM SERPL-SCNC: 142 MMOL/L (ref 136–145)
WBC MORPH BLD: NORMAL
WBC NRBC COR # BLD: 6.71 10*3/MM3 (ref 3.4–10.8)

## 2022-01-25 PROCEDURE — 85025 COMPLETE CBC W/AUTO DIFF WBC: CPT | Performed by: EMERGENCY MEDICINE

## 2022-01-25 PROCEDURE — 82962 GLUCOSE BLOOD TEST: CPT

## 2022-01-25 PROCEDURE — 93010 ELECTROCARDIOGRAM REPORT: CPT | Performed by: INTERNAL MEDICINE

## 2022-01-25 PROCEDURE — 25010000002 DEXAMETHASONE PER 1 MG: Performed by: INTERNAL MEDICINE

## 2022-01-25 PROCEDURE — G0378 HOSPITAL OBSERVATION PER HR: HCPCS

## 2022-01-25 PROCEDURE — 80053 COMPREHEN METABOLIC PANEL: CPT | Performed by: EMERGENCY MEDICINE

## 2022-01-25 PROCEDURE — 93005 ELECTROCARDIOGRAM TRACING: CPT | Performed by: EMERGENCY MEDICINE

## 2022-01-25 PROCEDURE — 85007 BL SMEAR W/DIFF WBC COUNT: CPT | Performed by: EMERGENCY MEDICINE

## 2022-01-25 PROCEDURE — 0 LEVETIRACETAM IN NACL 0.75% 1000 MG/100ML SOLUTION: Performed by: INTERNAL MEDICINE

## 2022-01-25 PROCEDURE — U0004 COV-19 TEST NON-CDC HGH THRU: HCPCS | Performed by: EMERGENCY MEDICINE

## 2022-01-25 PROCEDURE — 36415 COLL VENOUS BLD VENIPUNCTURE: CPT

## 2022-01-25 PROCEDURE — 99285 EMERGENCY DEPT VISIT HI MDM: CPT

## 2022-01-25 PROCEDURE — 70450 CT HEAD/BRAIN W/O DYE: CPT

## 2022-01-25 RX ORDER — ATENOLOL 50 MG/1
50 TABLET ORAL DAILY
Status: DISCONTINUED | OUTPATIENT
Start: 2022-01-25 | End: 2022-01-28

## 2022-01-25 RX ORDER — DEXAMETHASONE SODIUM PHOSPHATE 10 MG/ML
6 INJECTION INTRAMUSCULAR; INTRAVENOUS EVERY 8 HOURS
Status: DISCONTINUED | OUTPATIENT
Start: 2022-01-25 | End: 2022-01-27

## 2022-01-25 RX ORDER — LEVETIRACETAM 10 MG/ML
1000 INJECTION INTRAVASCULAR EVERY 12 HOURS SCHEDULED
Status: DISCONTINUED | OUTPATIENT
Start: 2022-01-25 | End: 2022-01-27

## 2022-01-25 RX ORDER — NITROGLYCERIN 0.4 MG/1
0.4 TABLET SUBLINGUAL
Status: DISCONTINUED | OUTPATIENT
Start: 2022-01-25 | End: 2022-01-29 | Stop reason: HOSPADM

## 2022-01-25 RX ORDER — SODIUM CHLORIDE 0.9 % (FLUSH) 0.9 %
10 SYRINGE (ML) INJECTION EVERY 12 HOURS SCHEDULED
Status: DISCONTINUED | OUTPATIENT
Start: 2022-01-25 | End: 2022-01-29 | Stop reason: HOSPADM

## 2022-01-25 RX ORDER — SODIUM CHLORIDE 0.9 % (FLUSH) 0.9 %
10 SYRINGE (ML) INJECTION AS NEEDED
Status: DISCONTINUED | OUTPATIENT
Start: 2022-01-25 | End: 2022-01-29 | Stop reason: HOSPADM

## 2022-01-25 RX ORDER — MELATONIN
1000 DAILY
Status: DISCONTINUED | OUTPATIENT
Start: 2022-01-25 | End: 2022-01-29 | Stop reason: HOSPADM

## 2022-01-25 RX ORDER — ASCORBIC ACID 500 MG
1000 TABLET ORAL DAILY
Status: DISCONTINUED | OUTPATIENT
Start: 2022-01-25 | End: 2022-01-29 | Stop reason: HOSPADM

## 2022-01-25 RX ORDER — LISINOPRIL 40 MG/1
40 TABLET ORAL DAILY
Status: DISCONTINUED | OUTPATIENT
Start: 2022-01-25 | End: 2022-01-29 | Stop reason: HOSPADM

## 2022-01-25 RX ORDER — BACITRACIN ZINC 500 [USP'U]/G
1 OINTMENT TOPICAL EVERY 12 HOURS SCHEDULED
Status: DISCONTINUED | OUTPATIENT
Start: 2022-01-25 | End: 2022-01-29 | Stop reason: HOSPADM

## 2022-01-25 RX ORDER — ONDANSETRON 2 MG/ML
4 INJECTION INTRAMUSCULAR; INTRAVENOUS EVERY 6 HOURS PRN
Status: DISCONTINUED | OUTPATIENT
Start: 2022-01-25 | End: 2022-01-29 | Stop reason: HOSPADM

## 2022-01-25 RX ORDER — ZOLPIDEM TARTRATE 5 MG/1
5 TABLET ORAL NIGHTLY PRN
Status: DISCONTINUED | OUTPATIENT
Start: 2022-01-25 | End: 2022-01-29 | Stop reason: HOSPADM

## 2022-01-25 RX ORDER — ACETAMINOPHEN 325 MG/1
650 TABLET ORAL EVERY 4 HOURS PRN
Status: DISCONTINUED | OUTPATIENT
Start: 2022-01-25 | End: 2022-01-29 | Stop reason: HOSPADM

## 2022-01-25 RX ORDER — ACETAMINOPHEN 650 MG/1
650 SUPPOSITORY RECTAL EVERY 4 HOURS PRN
Status: DISCONTINUED | OUTPATIENT
Start: 2022-01-25 | End: 2022-01-29 | Stop reason: HOSPADM

## 2022-01-25 RX ORDER — CHOLECALCIFEROL (VITAMIN D3) 125 MCG
1000 CAPSULE ORAL DAILY
Status: DISCONTINUED | OUTPATIENT
Start: 2022-01-25 | End: 2022-01-29 | Stop reason: HOSPADM

## 2022-01-25 RX ORDER — ACETAMINOPHEN 160 MG/5ML
650 SOLUTION ORAL EVERY 4 HOURS PRN
Status: DISCONTINUED | OUTPATIENT
Start: 2022-01-25 | End: 2022-01-29 | Stop reason: HOSPADM

## 2022-01-25 RX ORDER — SULFASALAZINE 500 MG/1
1000 TABLET ORAL 3 TIMES DAILY
Status: DISCONTINUED | OUTPATIENT
Start: 2022-01-25 | End: 2022-01-29 | Stop reason: HOSPADM

## 2022-01-25 RX ADMIN — DEXAMETHASONE SODIUM PHOSPHATE 6 MG: 10 INJECTION, SOLUTION INTRAMUSCULAR; INTRAVENOUS at 20:38

## 2022-01-25 RX ADMIN — SODIUM CHLORIDE, PRESERVATIVE FREE 10 ML: 5 INJECTION INTRAVENOUS at 20:48

## 2022-01-25 RX ADMIN — SODIUM CHLORIDE 5 MG/HR: 9 INJECTION, SOLUTION INTRAVENOUS at 21:54

## 2022-01-25 RX ADMIN — SULFASALAZINE 1000 MG: 500 TABLET ORAL at 22:30

## 2022-01-25 RX ADMIN — LEVETIRACETAM 1000 MG: 10 INJECTION INTRAVASCULAR at 20:39

## 2022-01-25 NOTE — TELEPHONE ENCOUNTER
"Pt called c/o BLE weakness x 1 week. Pt stated he is having difficulty ambulating because both legs/feet feel \"heavy.\"  Pt denies numbness or pain, denies weakness anywhere else on body.    Pt was last seen in office 1/14/2022 for COVID-19 f/u.  H/o venous stasis dermatitis, reportedly improving as of 1/14/2022    I advised pt that Dr. ySkes is out of the office until the beginning of next week. Pt declined appt with Gail Tay DNP.     Pt has been scheduled with Dr. Sykes 1/31/2022 at 12:45pm.    I advised pt that if weakness worsens, numbness occurs, or pt experiences redness, burning, or pain, he needs to go to ER immediately. Pt verbalized understanding.  "

## 2022-01-26 ENCOUNTER — APPOINTMENT (OUTPATIENT)
Dept: MRI IMAGING | Facility: HOSPITAL | Age: 82
End: 2022-01-26

## 2022-01-26 LAB
ALBUMIN SERPL-MCNC: 3.6 G/DL (ref 3.5–5.2)
ALBUMIN/GLOB SERPL: 1.4 G/DL
ALP SERPL-CCNC: 65 U/L (ref 39–117)
ALT SERPL W P-5'-P-CCNC: 15 U/L (ref 1–41)
ANION GAP SERPL CALCULATED.3IONS-SCNC: 9 MMOL/L (ref 5–15)
AST SERPL-CCNC: 18 U/L (ref 1–40)
BASOPHILS # BLD AUTO: 0.02 10*3/MM3 (ref 0–0.2)
BASOPHILS NFR BLD AUTO: 0.2 % (ref 0–1.5)
BILIRUB SERPL-MCNC: 0.3 MG/DL (ref 0–1.2)
BUN SERPL-MCNC: 18 MG/DL (ref 8–23)
BUN/CREAT SERPL: 18.6 (ref 7–25)
CALCIUM SPEC-SCNC: 8.7 MG/DL (ref 8.6–10.5)
CHLORIDE SERPL-SCNC: 106 MMOL/L (ref 98–107)
CO2 SERPL-SCNC: 23 MMOL/L (ref 22–29)
CREAT SERPL-MCNC: 0.97 MG/DL (ref 0.76–1.27)
DEPRECATED RDW RBC AUTO: 45.6 FL (ref 37–54)
EOSINOPHIL # BLD AUTO: 0 10*3/MM3 (ref 0–0.4)
EOSINOPHIL NFR BLD AUTO: 0 % (ref 0.3–6.2)
ERYTHROCYTE [DISTWIDTH] IN BLOOD BY AUTOMATED COUNT: 14 % (ref 12.3–15.4)
GFR SERPL CREATININE-BSD FRML MDRD: 74 ML/MIN/1.73
GLOBULIN UR ELPH-MCNC: 2.5 GM/DL
GLUCOSE SERPL-MCNC: 125 MG/DL (ref 65–99)
HCT VFR BLD AUTO: 39.9 % (ref 37.5–51)
HGB BLD-MCNC: 12.8 G/DL (ref 13–17.7)
IMM GRANULOCYTES # BLD AUTO: 0.04 10*3/MM3 (ref 0–0.05)
IMM GRANULOCYTES NFR BLD AUTO: 0.5 % (ref 0–0.5)
LYMPHOCYTES # BLD AUTO: 0.81 10*3/MM3 (ref 0.7–3.1)
LYMPHOCYTES NFR BLD AUTO: 9.9 % (ref 19.6–45.3)
MCH RBC QN AUTO: 28.8 PG (ref 26.6–33)
MCHC RBC AUTO-ENTMCNC: 32.1 G/DL (ref 31.5–35.7)
MCV RBC AUTO: 89.7 FL (ref 79–97)
MONOCYTES # BLD AUTO: 0.16 10*3/MM3 (ref 0.1–0.9)
MONOCYTES NFR BLD AUTO: 2 % (ref 5–12)
NEUTROPHILS NFR BLD AUTO: 7.14 10*3/MM3 (ref 1.7–7)
NEUTROPHILS NFR BLD AUTO: 87.4 % (ref 42.7–76)
NRBC BLD AUTO-RTO: 0 /100 WBC (ref 0–0.2)
PLATELET # BLD AUTO: 217 10*3/MM3 (ref 140–450)
PMV BLD AUTO: 9.7 FL (ref 6–12)
POTASSIUM SERPL-SCNC: 4.9 MMOL/L (ref 3.5–5.2)
PROT SERPL-MCNC: 6.1 G/DL (ref 6–8.5)
RBC # BLD AUTO: 4.45 10*6/MM3 (ref 4.14–5.8)
SODIUM SERPL-SCNC: 138 MMOL/L (ref 136–145)
WBC NRBC COR # BLD: 8.17 10*3/MM3 (ref 3.4–10.8)

## 2022-01-26 PROCEDURE — 0 LEVETIRACETAM IN NACL 0.75% 1000 MG/100ML SOLUTION: Performed by: INTERNAL MEDICINE

## 2022-01-26 PROCEDURE — 97530 THERAPEUTIC ACTIVITIES: CPT

## 2022-01-26 PROCEDURE — G0378 HOSPITAL OBSERVATION PER HR: HCPCS

## 2022-01-26 PROCEDURE — 36415 COLL VENOUS BLD VENIPUNCTURE: CPT | Performed by: INTERNAL MEDICINE

## 2022-01-26 PROCEDURE — 85025 COMPLETE CBC W/AUTO DIFF WBC: CPT | Performed by: INTERNAL MEDICINE

## 2022-01-26 PROCEDURE — 25010000002 DEXAMETHASONE PER 1 MG: Performed by: INTERNAL MEDICINE

## 2022-01-26 PROCEDURE — 99221 1ST HOSP IP/OBS SF/LOW 40: CPT | Performed by: NURSE PRACTITIONER

## 2022-01-26 PROCEDURE — 97162 PT EVAL MOD COMPLEX 30 MIN: CPT

## 2022-01-26 PROCEDURE — 72146 MRI CHEST SPINE W/O DYE: CPT

## 2022-01-26 PROCEDURE — 80053 COMPREHEN METABOLIC PANEL: CPT | Performed by: INTERNAL MEDICINE

## 2022-01-26 RX ORDER — SODIUM CHLORIDE, SODIUM LACTATE, POTASSIUM CHLORIDE, CALCIUM CHLORIDE 600; 310; 30; 20 MG/100ML; MG/100ML; MG/100ML; MG/100ML
50 INJECTION, SOLUTION INTRAVENOUS CONTINUOUS
Status: DISCONTINUED | OUTPATIENT
Start: 2022-01-26 | End: 2022-01-27

## 2022-01-26 RX ORDER — DIAZEPAM 5 MG/1
5 TABLET ORAL ONCE AS NEEDED
Status: COMPLETED | OUTPATIENT
Start: 2022-01-26 | End: 2022-01-27

## 2022-01-26 RX ADMIN — SODIUM CHLORIDE, PRESERVATIVE FREE 10 ML: 5 INJECTION INTRAVENOUS at 09:56

## 2022-01-26 RX ADMIN — SODIUM CHLORIDE, PRESERVATIVE FREE 10 ML: 5 INJECTION INTRAVENOUS at 20:01

## 2022-01-26 RX ADMIN — SODIUM CHLORIDE, POTASSIUM CHLORIDE, SODIUM LACTATE AND CALCIUM CHLORIDE 50 ML/HR: 600; 310; 30; 20 INJECTION, SOLUTION INTRAVENOUS at 13:41

## 2022-01-26 RX ADMIN — BACITRACIN 1 APPLICATION: 500 OINTMENT TOPICAL at 00:26

## 2022-01-26 RX ADMIN — SULFASALAZINE 1000 MG: 500 TABLET ORAL at 17:12

## 2022-01-26 RX ADMIN — LEVETIRACETAM 1000 MG: 10 INJECTION INTRAVASCULAR at 22:57

## 2022-01-26 RX ADMIN — BACITRACIN 1 APPLICATION: 500 OINTMENT TOPICAL at 20:01

## 2022-01-26 RX ADMIN — DEXAMETHASONE SODIUM PHOSPHATE 6 MG: 10 INJECTION, SOLUTION INTRAMUSCULAR; INTRAVENOUS at 04:50

## 2022-01-26 RX ADMIN — SODIUM CHLORIDE 2.5 MG/HR: 9 INJECTION, SOLUTION INTRAVENOUS at 22:57

## 2022-01-26 RX ADMIN — BACITRACIN 1 APPLICATION: 500 OINTMENT TOPICAL at 09:42

## 2022-01-26 RX ADMIN — DEXAMETHASONE SODIUM PHOSPHATE 6 MG: 10 INJECTION, SOLUTION INTRAMUSCULAR; INTRAVENOUS at 19:57

## 2022-01-26 RX ADMIN — ZOLPIDEM TARTRATE 5 MG: 5 TABLET ORAL at 22:57

## 2022-01-26 RX ADMIN — SULFASALAZINE 1000 MG: 500 TABLET ORAL at 09:45

## 2022-01-26 RX ADMIN — ZOLPIDEM TARTRATE 5 MG: 5 TABLET ORAL at 00:26

## 2022-01-26 RX ADMIN — DEXAMETHASONE SODIUM PHOSPHATE 6 MG: 10 INJECTION, SOLUTION INTRAMUSCULAR; INTRAVENOUS at 13:26

## 2022-01-26 RX ADMIN — LEVETIRACETAM 1000 MG: 10 INJECTION INTRAVASCULAR at 09:45

## 2022-01-26 RX ADMIN — SULFASALAZINE 1000 MG: 500 TABLET ORAL at 22:57

## 2022-01-26 RX ADMIN — SODIUM CHLORIDE 2.5 MG/HR: 9 INJECTION, SOLUTION INTRAVENOUS at 13:28

## 2022-01-27 ENCOUNTER — APPOINTMENT (OUTPATIENT)
Dept: CARDIOLOGY | Facility: HOSPITAL | Age: 82
End: 2022-01-27

## 2022-01-27 ENCOUNTER — APPOINTMENT (OUTPATIENT)
Dept: MRI IMAGING | Facility: HOSPITAL | Age: 82
End: 2022-01-27

## 2022-01-27 PROBLEM — D32.9 MENINGIOMA (HCC): Status: ACTIVE | Noted: 2022-01-27

## 2022-01-27 PROBLEM — I63.9 ACUTE CVA (CEREBROVASCULAR ACCIDENT) (HCC): Status: ACTIVE | Noted: 2022-01-27

## 2022-01-27 LAB
ANION GAP SERPL CALCULATED.3IONS-SCNC: 8.9 MMOL/L (ref 5–15)
AORTIC DIMENSIONLESS INDEX: 0.8 (DI)
ASCENDING AORTA: 3.2 CM
BH CV ECHO MEAS - ACS: 2.2 CM
BH CV ECHO MEAS - AO MAX PG (FULL): 3.1 MMHG
BH CV ECHO MEAS - AO MAX PG: 6.7 MMHG
BH CV ECHO MEAS - AO MEAN PG (FULL): 1.4 MMHG
BH CV ECHO MEAS - AO MEAN PG: 3.1 MMHG
BH CV ECHO MEAS - AO ROOT AREA (BSA CORRECTED): 1.5
BH CV ECHO MEAS - AO ROOT AREA: 12.5 CM^2
BH CV ECHO MEAS - AO ROOT DIAM: 4 CM
BH CV ECHO MEAS - AO V2 MAX: 129.5 CM/SEC
BH CV ECHO MEAS - AO V2 MEAN: 80.7 CM/SEC
BH CV ECHO MEAS - AO V2 VTI: 26.4 CM
BH CV ECHO MEAS - ASC AORTA: 3.2 CM
BH CV ECHO MEAS - AVA(I,A): 3.2 CM^2
BH CV ECHO MEAS - AVA(I,D): 3.2 CM^2
BH CV ECHO MEAS - AVA(V,A): 3.1 CM^2
BH CV ECHO MEAS - AVA(V,D): 3.1 CM^2
BH CV ECHO MEAS - BSA(HAYCOCK): 2.8 M^2
BH CV ECHO MEAS - BSA: 2.6 M^2
BH CV ECHO MEAS - BZI_BMI: 46.3 KILOGRAMS/M^2
BH CV ECHO MEAS - BZI_METRIC_HEIGHT: 180.3 CM
BH CV ECHO MEAS - BZI_METRIC_WEIGHT: 150.6 KG
BH CV ECHO MEAS - EDV(CUBED): 169.1 ML
BH CV ECHO MEAS - EDV(MOD-SP2): 124 ML
BH CV ECHO MEAS - EDV(MOD-SP4): 153 ML
BH CV ECHO MEAS - EDV(TEICH): 149.3 ML
BH CV ECHO MEAS - EF(CUBED): 71.8 %
BH CV ECHO MEAS - EF(MOD-BP): 59 %
BH CV ECHO MEAS - EF(MOD-SP2): 48.4 %
BH CV ECHO MEAS - EF(MOD-SP4): 62.7 %
BH CV ECHO MEAS - EF(TEICH): 62.9 %
BH CV ECHO MEAS - ESV(CUBED): 47.7 ML
BH CV ECHO MEAS - ESV(MOD-SP2): 64 ML
BH CV ECHO MEAS - ESV(MOD-SP4): 57 ML
BH CV ECHO MEAS - ESV(TEICH): 55.4 ML
BH CV ECHO MEAS - FS: 34.4 %
BH CV ECHO MEAS - IVS/LVPW: 1.3
BH CV ECHO MEAS - IVSD: 1.2 CM
BH CV ECHO MEAS - LAT PEAK E' VEL: 8.1 CM/SEC
BH CV ECHO MEAS - LV DIASTOLIC VOL/BSA (35-75): 58.5 ML/M^2
BH CV ECHO MEAS - LV MASS(C)D: 242.8 GRAMS
BH CV ECHO MEAS - LV MASS(C)DI: 92.8 GRAMS/M^2
BH CV ECHO MEAS - LV MAX PG: 3.7 MMHG
BH CV ECHO MEAS - LV MEAN PG: 1.7 MMHG
BH CV ECHO MEAS - LV SYSTOLIC VOL/BSA (12-30): 21.8 ML/M^2
BH CV ECHO MEAS - LV V1 MAX: 95.5 CM/SEC
BH CV ECHO MEAS - LV V1 MEAN: 60.5 CM/SEC
BH CV ECHO MEAS - LV V1 VTI: 20.5 CM
BH CV ECHO MEAS - LVIDD: 5.5 CM
BH CV ECHO MEAS - LVIDS: 3.6 CM
BH CV ECHO MEAS - LVLD AP2: 7.8 CM
BH CV ECHO MEAS - LVLD AP4: 9 CM
BH CV ECHO MEAS - LVLS AP2: 7.3 CM
BH CV ECHO MEAS - LVLS AP4: 7.4 CM
BH CV ECHO MEAS - LVOT AREA (M): 4.2 CM^2
BH CV ECHO MEAS - LVOT AREA: 4.2 CM^2
BH CV ECHO MEAS - LVOT DIAM: 2.3 CM
BH CV ECHO MEAS - LVPWD: 0.96 CM
BH CV ECHO MEAS - MED PEAK E' VEL: 5.9 CM/SEC
BH CV ECHO MEAS - MV A DUR: 0.14 SEC
BH CV ECHO MEAS - MV A MAX VEL: 64.3 CM/SEC
BH CV ECHO MEAS - MV DEC SLOPE: 270.2 CM/SEC^2
BH CV ECHO MEAS - MV DEC TIME: 232 SEC
BH CV ECHO MEAS - MV E MAX VEL: 72.8 CM/SEC
BH CV ECHO MEAS - MV E/A: 1.1
BH CV ECHO MEAS - MV MAX PG: 2.5 MMHG
BH CV ECHO MEAS - MV MEAN PG: 1 MMHG
BH CV ECHO MEAS - MV P1/2T MAX VEL: 86.3 CM/SEC
BH CV ECHO MEAS - MV P1/2T: 93.5 MSEC
BH CV ECHO MEAS - MV V2 MAX: 79.6 CM/SEC
BH CV ECHO MEAS - MV V2 MEAN: 48.1 CM/SEC
BH CV ECHO MEAS - MV V2 VTI: 30 CM
BH CV ECHO MEAS - MVA P1/2T LCG: 2.5 CM^2
BH CV ECHO MEAS - MVA(P1/2T): 2.4 CM^2
BH CV ECHO MEAS - MVA(VTI): 2.8 CM^2
BH CV ECHO MEAS - PA ACC TIME: 0.1 SEC
BH CV ECHO MEAS - PA MAX PG (FULL): 2.4 MMHG
BH CV ECHO MEAS - PA MAX PG: 5.1 MMHG
BH CV ECHO MEAS - PA PR(ACCEL): 32.7 MMHG
BH CV ECHO MEAS - PA V2 MAX: 112.5 CM/SEC
BH CV ECHO MEAS - PULM A REVS DUR: 0.18 SEC
BH CV ECHO MEAS - PULM A REVS VEL: 27.8 CM/SEC
BH CV ECHO MEAS - PULM DIAS VEL: 50.2 CM/SEC
BH CV ECHO MEAS - PULM S/D: 1.4
BH CV ECHO MEAS - PULM SYS VEL: 72.6 CM/SEC
BH CV ECHO MEAS - PVA(V,A): 2.4 CM^2
BH CV ECHO MEAS - PVA(V,D): 2.4 CM^2
BH CV ECHO MEAS - QP/QS: 0.69
BH CV ECHO MEAS - RAP SYSTOLE: 3 MMHG
BH CV ECHO MEAS - RV MAX PG: 2.6 MMHG
BH CV ECHO MEAS - RV MEAN PG: 1.4 MMHG
BH CV ECHO MEAS - RV V1 MAX: 81 CM/SEC
BH CV ECHO MEAS - RV V1 MEAN: 54.8 CM/SEC
BH CV ECHO MEAS - RV V1 VTI: 17.7 CM
BH CV ECHO MEAS - RVOT AREA: 3.3 CM^2
BH CV ECHO MEAS - RVOT DIAM: 2.1 CM
BH CV ECHO MEAS - RVSP: 39 MMHG
BH CV ECHO MEAS - SI(AO): 125.9 ML/M^2
BH CV ECHO MEAS - SI(CUBED): 46.4 ML/M^2
BH CV ECHO MEAS - SI(LVOT): 32.5 ML/M^2
BH CV ECHO MEAS - SI(MOD-SP2): 22.9 ML/M^2
BH CV ECHO MEAS - SI(MOD-SP4): 36.7 ML/M^2
BH CV ECHO MEAS - SI(TEICH): 35.9 ML/M^2
BH CV ECHO MEAS - SV(AO): 329.3 ML
BH CV ECHO MEAS - SV(CUBED): 121.4 ML
BH CV ECHO MEAS - SV(LVOT): 85.1 ML
BH CV ECHO MEAS - SV(MOD-SP2): 60 ML
BH CV ECHO MEAS - SV(MOD-SP4): 96 ML
BH CV ECHO MEAS - SV(RVOT): 58.4 ML
BH CV ECHO MEAS - SV(TEICH): 93.9 ML
BH CV ECHO MEAS - TAPSE (>1.6): 1.5 CM
BH CV ECHO MEAS - TR MAX VEL: 298 CM/SEC
BH CV ECHO MEASUREMENTS AVERAGE E/E' RATIO: 10.4
BH CV VAS BP RIGHT ARM: NORMAL MMHG
BH CV XLRA - RV BASE: 3.5 CM
BH CV XLRA - RV LENGTH: 7.4 CM
BH CV XLRA - RV MID: 3 CM
BH CV XLRA - TDI S': 15.2 CM/SEC
BUN SERPL-MCNC: 22 MG/DL (ref 8–23)
BUN/CREAT SERPL: 26.5 (ref 7–25)
CALCIUM SPEC-SCNC: 8.7 MG/DL (ref 8.6–10.5)
CHLORIDE SERPL-SCNC: 107 MMOL/L (ref 98–107)
CO2 SERPL-SCNC: 23.1 MMOL/L (ref 22–29)
CREAT SERPL-MCNC: 0.83 MG/DL (ref 0.76–1.27)
GFR SERPL CREATININE-BSD FRML MDRD: 88 ML/MIN/1.73
GLUCOSE SERPL-MCNC: 117 MG/DL (ref 65–99)
LEFT ATRIUM VOLUME INDEX: 30 ML/M2
MAXIMAL PREDICTED HEART RATE: 137 BPM
POTASSIUM SERPL-SCNC: 4.7 MMOL/L (ref 3.5–5.2)
SINUS: 3.4 CM
SODIUM SERPL-SCNC: 139 MMOL/L (ref 136–145)
STJ: 3.2 CM
STRESS TARGET HR: 116 BPM

## 2022-01-27 PROCEDURE — 70553 MRI BRAIN STEM W/O & W/DYE: CPT

## 2022-01-27 PROCEDURE — 93306 TTE W/DOPPLER COMPLETE: CPT

## 2022-01-27 PROCEDURE — 25010000002 DEXAMETHASONE PER 1 MG: Performed by: INTERNAL MEDICINE

## 2022-01-27 PROCEDURE — 93306 TTE W/DOPPLER COMPLETE: CPT | Performed by: INTERNAL MEDICINE

## 2022-01-27 PROCEDURE — 99223 1ST HOSP IP/OBS HIGH 75: CPT | Performed by: NURSE PRACTITIONER

## 2022-01-27 PROCEDURE — 99231 SBSQ HOSP IP/OBS SF/LOW 25: CPT | Performed by: NURSE PRACTITIONER

## 2022-01-27 PROCEDURE — 0 GADOBENATE DIMEGLUMINE 529 MG/ML SOLUTION: Performed by: STUDENT IN AN ORGANIZED HEALTH CARE EDUCATION/TRAINING PROGRAM

## 2022-01-27 PROCEDURE — A9577 INJ MULTIHANCE: HCPCS | Performed by: STUDENT IN AN ORGANIZED HEALTH CARE EDUCATION/TRAINING PROGRAM

## 2022-01-27 PROCEDURE — 80048 BASIC METABOLIC PNL TOTAL CA: CPT | Performed by: STUDENT IN AN ORGANIZED HEALTH CARE EDUCATION/TRAINING PROGRAM

## 2022-01-27 PROCEDURE — 92610 EVALUATE SWALLOWING FUNCTION: CPT

## 2022-01-27 PROCEDURE — 25010000002 PERFLUTREN (DEFINITY) 8.476 MG IN SODIUM CHLORIDE (PF) 0.9 % 10 ML INJECTION: Performed by: STUDENT IN AN ORGANIZED HEALTH CARE EDUCATION/TRAINING PROGRAM

## 2022-01-27 RX ORDER — CLOPIDOGREL BISULFATE 75 MG/1
75 TABLET ORAL DAILY
Status: DISCONTINUED | OUTPATIENT
Start: 2022-01-28 | End: 2022-01-29 | Stop reason: HOSPADM

## 2022-01-27 RX ORDER — ATORVASTATIN CALCIUM 80 MG/1
80 TABLET, FILM COATED ORAL DAILY
Status: DISCONTINUED | OUTPATIENT
Start: 2022-01-27 | End: 2022-01-27

## 2022-01-27 RX ORDER — ASPIRIN 81 MG/1
81 TABLET, CHEWABLE ORAL DAILY
Status: DISCONTINUED | OUTPATIENT
Start: 2022-01-27 | End: 2022-01-29 | Stop reason: HOSPADM

## 2022-01-27 RX ORDER — ATORVASTATIN CALCIUM 20 MG/1
40 TABLET, FILM COATED ORAL DAILY
Status: DISCONTINUED | OUTPATIENT
Start: 2022-01-28 | End: 2022-01-29 | Stop reason: HOSPADM

## 2022-01-27 RX ORDER — NICOTINE POLACRILEX 4 MG
15 LOZENGE BUCCAL
Status: DISCONTINUED | OUTPATIENT
Start: 2022-01-27 | End: 2022-01-29 | Stop reason: HOSPADM

## 2022-01-27 RX ORDER — DEXTROSE MONOHYDRATE 25 G/50ML
25 INJECTION, SOLUTION INTRAVENOUS
Status: DISCONTINUED | OUTPATIENT
Start: 2022-01-27 | End: 2022-01-29 | Stop reason: HOSPADM

## 2022-01-27 RX ORDER — CLOPIDOGREL BISULFATE 75 MG/1
75 TABLET ORAL DAILY
Status: DISCONTINUED | OUTPATIENT
Start: 2022-01-27 | End: 2022-01-27

## 2022-01-27 RX ORDER — CLOPIDOGREL BISULFATE 75 MG/1
300 TABLET ORAL ONCE
Status: COMPLETED | OUTPATIENT
Start: 2022-01-27 | End: 2022-01-27

## 2022-01-27 RX ADMIN — OXYCODONE HYDROCHLORIDE AND ACETAMINOPHEN 1000 MG: 500 TABLET ORAL at 14:43

## 2022-01-27 RX ADMIN — BACITRACIN 1 APPLICATION: 500 OINTMENT TOPICAL at 14:46

## 2022-01-27 RX ADMIN — DIAZEPAM 5 MG: 5 TABLET ORAL at 06:45

## 2022-01-27 RX ADMIN — ZOLPIDEM TARTRATE 5 MG: 5 TABLET ORAL at 22:12

## 2022-01-27 RX ADMIN — ASPIRIN 81 MG: 81 TABLET, CHEWABLE ORAL at 15:54

## 2022-01-27 RX ADMIN — DEXAMETHASONE SODIUM PHOSPHATE 6 MG: 10 INJECTION, SOLUTION INTRAMUSCULAR; INTRAVENOUS at 14:47

## 2022-01-27 RX ADMIN — Medication 1000 MCG: at 14:44

## 2022-01-27 RX ADMIN — SODIUM CHLORIDE, PRESERVATIVE FREE 10 ML: 5 INJECTION INTRAVENOUS at 20:39

## 2022-01-27 RX ADMIN — SULFASALAZINE 1000 MG: 500 TABLET ORAL at 14:43

## 2022-01-27 RX ADMIN — PERFLUTREN 2 ML: 6.52 INJECTION, SUSPENSION INTRAVENOUS at 17:25

## 2022-01-27 RX ADMIN — LISINOPRIL 40 MG: 40 TABLET ORAL at 14:43

## 2022-01-27 RX ADMIN — ATENOLOL 50 MG: 50 TABLET ORAL at 14:43

## 2022-01-27 RX ADMIN — BACITRACIN 1 APPLICATION: 500 OINTMENT TOPICAL at 20:39

## 2022-01-27 RX ADMIN — Medication 1000 UNITS: at 14:44

## 2022-01-27 RX ADMIN — DEXAMETHASONE SODIUM PHOSPHATE 6 MG: 10 INJECTION, SOLUTION INTRAMUSCULAR; INTRAVENOUS at 04:26

## 2022-01-27 RX ADMIN — SODIUM CHLORIDE, PRESERVATIVE FREE 10 ML: 5 INJECTION INTRAVENOUS at 14:45

## 2022-01-27 RX ADMIN — SULFASALAZINE 1000 MG: 500 TABLET ORAL at 22:12

## 2022-01-27 RX ADMIN — GADOBENATE DIMEGLUMINE 20 ML: 529 INJECTION, SOLUTION INTRAVENOUS at 09:07

## 2022-01-27 RX ADMIN — CLOPIDOGREL 300 MG: 75 TABLET, FILM COATED ORAL at 20:39

## 2022-01-28 ENCOUNTER — APPOINTMENT (OUTPATIENT)
Dept: CT IMAGING | Facility: HOSPITAL | Age: 82
End: 2022-01-28

## 2022-01-28 ENCOUNTER — APPOINTMENT (OUTPATIENT)
Dept: MRI IMAGING | Facility: HOSPITAL | Age: 82
End: 2022-01-28

## 2022-01-28 LAB
ANION GAP SERPL CALCULATED.3IONS-SCNC: 8 MMOL/L (ref 5–15)
BUN SERPL-MCNC: 25 MG/DL (ref 8–23)
BUN/CREAT SERPL: 23.1 (ref 7–25)
CALCIUM SPEC-SCNC: 8.4 MG/DL (ref 8.6–10.5)
CHLORIDE SERPL-SCNC: 106 MMOL/L (ref 98–107)
CO2 SERPL-SCNC: 25 MMOL/L (ref 22–29)
CREAT SERPL-MCNC: 1.08 MG/DL (ref 0.76–1.27)
DEPRECATED RDW RBC AUTO: 43.9 FL (ref 37–54)
ERYTHROCYTE [DISTWIDTH] IN BLOOD BY AUTOMATED COUNT: 14.1 % (ref 12.3–15.4)
GFR SERPL CREATININE-BSD FRML MDRD: 65 ML/MIN/1.73
GLUCOSE SERPL-MCNC: 85 MG/DL (ref 65–99)
HCT VFR BLD AUTO: 37.9 % (ref 37.5–51)
HGB BLD-MCNC: 12.8 G/DL (ref 13–17.7)
MCH RBC QN AUTO: 28.7 PG (ref 26.6–33)
MCHC RBC AUTO-ENTMCNC: 33.8 G/DL (ref 31.5–35.7)
MCV RBC AUTO: 85 FL (ref 79–97)
PLATELET # BLD AUTO: 211 10*3/MM3 (ref 140–450)
PMV BLD AUTO: 9.6 FL (ref 6–12)
POTASSIUM SERPL-SCNC: 4.1 MMOL/L (ref 3.5–5.2)
RBC # BLD AUTO: 4.46 10*6/MM3 (ref 4.14–5.8)
SODIUM SERPL-SCNC: 139 MMOL/L (ref 136–145)
WBC NRBC COR # BLD: 11.25 10*3/MM3 (ref 3.4–10.8)

## 2022-01-28 PROCEDURE — 70496 CT ANGIOGRAPHY HEAD: CPT

## 2022-01-28 PROCEDURE — 0 IOPAMIDOL PER 1 ML: Performed by: STUDENT IN AN ORGANIZED HEALTH CARE EDUCATION/TRAINING PROGRAM

## 2022-01-28 PROCEDURE — 80048 BASIC METABOLIC PNL TOTAL CA: CPT | Performed by: STUDENT IN AN ORGANIZED HEALTH CARE EDUCATION/TRAINING PROGRAM

## 2022-01-28 PROCEDURE — 97110 THERAPEUTIC EXERCISES: CPT

## 2022-01-28 PROCEDURE — 85027 COMPLETE CBC AUTOMATED: CPT | Performed by: STUDENT IN AN ORGANIZED HEALTH CARE EDUCATION/TRAINING PROGRAM

## 2022-01-28 PROCEDURE — 70498 CT ANGIOGRAPHY NECK: CPT

## 2022-01-28 PROCEDURE — 99233 SBSQ HOSP IP/OBS HIGH 50: CPT | Performed by: NURSE PRACTITIONER

## 2022-01-28 RX ORDER — LORAZEPAM 1 MG/1
1 TABLET ORAL ONCE AS NEEDED
Status: DISCONTINUED | OUTPATIENT
Start: 2022-01-28 | End: 2022-01-28

## 2022-01-28 RX ORDER — ATENOLOL 50 MG/1
50 TABLET ORAL DAILY
Status: DISCONTINUED | OUTPATIENT
Start: 2022-01-29 | End: 2022-01-29 | Stop reason: HOSPADM

## 2022-01-28 RX ADMIN — SULFASALAZINE 1000 MG: 500 TABLET ORAL at 09:42

## 2022-01-28 RX ADMIN — LISINOPRIL 40 MG: 40 TABLET ORAL at 09:42

## 2022-01-28 RX ADMIN — SULFASALAZINE 1000 MG: 500 TABLET ORAL at 21:11

## 2022-01-28 RX ADMIN — SODIUM CHLORIDE, PRESERVATIVE FREE 10 ML: 5 INJECTION INTRAVENOUS at 09:43

## 2022-01-28 RX ADMIN — Medication 1000 MCG: at 09:42

## 2022-01-28 RX ADMIN — ZOLPIDEM TARTRATE 5 MG: 5 TABLET ORAL at 21:16

## 2022-01-28 RX ADMIN — ASPIRIN 81 MG: 81 TABLET, CHEWABLE ORAL at 09:43

## 2022-01-28 RX ADMIN — BACITRACIN 1 APPLICATION: 500 OINTMENT TOPICAL at 21:11

## 2022-01-28 RX ADMIN — ATORVASTATIN CALCIUM 40 MG: 20 TABLET, FILM COATED ORAL at 09:42

## 2022-01-28 RX ADMIN — OXYCODONE HYDROCHLORIDE AND ACETAMINOPHEN 1000 MG: 500 TABLET ORAL at 09:43

## 2022-01-28 RX ADMIN — BACITRACIN 1 APPLICATION: 500 OINTMENT TOPICAL at 09:43

## 2022-01-28 RX ADMIN — SULFASALAZINE 1000 MG: 500 TABLET ORAL at 16:51

## 2022-01-28 RX ADMIN — CLOPIDOGREL 75 MG: 75 TABLET, FILM COATED ORAL at 09:43

## 2022-01-28 RX ADMIN — ATENOLOL 50 MG: 50 TABLET ORAL at 23:32

## 2022-01-28 RX ADMIN — Medication 1000 UNITS: at 09:43

## 2022-01-28 RX ADMIN — IOPAMIDOL 95 ML: 755 INJECTION, SOLUTION INTRAVENOUS at 13:50

## 2022-01-29 ENCOUNTER — READMISSION MANAGEMENT (OUTPATIENT)
Dept: CALL CENTER | Facility: HOSPITAL | Age: 82
End: 2022-01-29

## 2022-01-29 VITALS
DIASTOLIC BLOOD PRESSURE: 79 MMHG | OXYGEN SATURATION: 95 % | TEMPERATURE: 97.4 F | SYSTOLIC BLOOD PRESSURE: 181 MMHG | BODY MASS INDEX: 44.1 KG/M2 | RESPIRATION RATE: 16 BRPM | HEIGHT: 71 IN | HEART RATE: 76 BPM | WEIGHT: 315 LBS

## 2022-01-29 RX ORDER — AMLODIPINE BESYLATE 2.5 MG/1
2.5 TABLET ORAL
Status: DISCONTINUED | OUTPATIENT
Start: 2022-01-29 | End: 2022-01-29 | Stop reason: HOSPADM

## 2022-01-29 RX ORDER — AMLODIPINE BESYLATE 2.5 MG/1
2.5 TABLET ORAL
Qty: 30 TABLET | Refills: 1 | Status: SHIPPED | OUTPATIENT
Start: 2022-01-29 | End: 2022-01-31

## 2022-01-29 RX ORDER — ASPIRIN 81 MG/1
81 TABLET, CHEWABLE ORAL DAILY
Qty: 30 TABLET | Refills: 4 | Status: SHIPPED | OUTPATIENT
Start: 2022-01-30 | End: 2022-05-25

## 2022-01-29 RX ORDER — CLOPIDOGREL BISULFATE 75 MG/1
75 TABLET ORAL DAILY
Qty: 19 TABLET | Refills: 0 | Status: SHIPPED | OUTPATIENT
Start: 2022-01-30 | End: 2022-02-18

## 2022-01-29 RX ORDER — ATORVASTATIN CALCIUM 40 MG/1
40 TABLET, FILM COATED ORAL DAILY
Qty: 30 TABLET | Refills: 1 | Status: SHIPPED | OUTPATIENT
Start: 2022-01-30 | End: 2022-01-31 | Stop reason: SDUPTHER

## 2022-01-29 RX ADMIN — ATORVASTATIN CALCIUM 40 MG: 20 TABLET, FILM COATED ORAL at 08:26

## 2022-01-29 RX ADMIN — SODIUM CHLORIDE, PRESERVATIVE FREE 10 ML: 5 INJECTION INTRAVENOUS at 08:29

## 2022-01-29 RX ADMIN — Medication 1000 MCG: at 08:27

## 2022-01-29 RX ADMIN — ASPIRIN 81 MG: 81 TABLET, CHEWABLE ORAL at 08:27

## 2022-01-29 RX ADMIN — OXYCODONE HYDROCHLORIDE AND ACETAMINOPHEN 1000 MG: 500 TABLET ORAL at 08:27

## 2022-01-29 RX ADMIN — Medication 1000 UNITS: at 08:27

## 2022-01-29 RX ADMIN — LISINOPRIL 40 MG: 40 TABLET ORAL at 08:27

## 2022-01-29 RX ADMIN — AMLODIPINE BESYLATE 2.5 MG: 2.5 TABLET ORAL at 11:24

## 2022-01-29 RX ADMIN — BACITRACIN 1 APPLICATION: 500 OINTMENT TOPICAL at 08:29

## 2022-01-29 RX ADMIN — CLOPIDOGREL 75 MG: 75 TABLET, FILM COATED ORAL at 08:26

## 2022-01-29 RX ADMIN — SULFASALAZINE 1000 MG: 500 TABLET ORAL at 08:27

## 2022-01-29 NOTE — OUTREACH NOTE
Prep Survey      Responses   [facility] patient discharged from? [place]   Is LACE score < 7 ? No   Emergency Room discharge w/ pulse ox? No   Eligibility [facility]   Date of Admission 01/25/22   Date of Discharge 01/29/22   Discharge Disposition Home or Self Care   Discharge diagnosis Acute CVA   Does the patient have one of the following disease processes/diagnoses(primary or secondary)? Stroke (TIA)   Does the patient have Home health ordered? No   Is there a DME ordered? No   Prep survey completed? Yes          Ya Bledsoe RN

## 2022-01-31 ENCOUNTER — OFFICE VISIT (OUTPATIENT)
Dept: INTERNAL MEDICINE | Facility: CLINIC | Age: 82
End: 2022-01-31

## 2022-01-31 ENCOUNTER — TRANSITIONAL CARE MANAGEMENT TELEPHONE ENCOUNTER (OUTPATIENT)
Dept: CALL CENTER | Facility: HOSPITAL | Age: 82
End: 2022-01-31

## 2022-01-31 ENCOUNTER — TELEPHONE (OUTPATIENT)
Dept: NEUROLOGY | Facility: CLINIC | Age: 82
End: 2022-01-31

## 2022-01-31 VITALS
BODY MASS INDEX: 34.65 KG/M2 | TEMPERATURE: 97.5 F | WEIGHT: 247.5 LBS | OXYGEN SATURATION: 97 % | DIASTOLIC BLOOD PRESSURE: 82 MMHG | HEIGHT: 71 IN | SYSTOLIC BLOOD PRESSURE: 162 MMHG | HEART RATE: 72 BPM

## 2022-01-31 DIAGNOSIS — D32.9 MENINGIOMA: ICD-10-CM

## 2022-01-31 DIAGNOSIS — Z09 HOSPITAL DISCHARGE FOLLOW-UP: Primary | ICD-10-CM

## 2022-01-31 DIAGNOSIS — E78.5 HYPERLIPIDEMIA, UNSPECIFIED HYPERLIPIDEMIA TYPE: ICD-10-CM

## 2022-01-31 DIAGNOSIS — I63.9 ACUTE CVA (CEREBROVASCULAR ACCIDENT): Primary | ICD-10-CM

## 2022-01-31 DIAGNOSIS — I63.50 CEREBROVASCULAR ACCIDENT (CVA) OF PONTINE STRUCTURE: ICD-10-CM

## 2022-01-31 DIAGNOSIS — G31.84 MILD COGNITIVE IMPAIRMENT: ICD-10-CM

## 2022-01-31 PROCEDURE — 1111F DSCHRG MED/CURRENT MED MERGE: CPT | Performed by: FAMILY MEDICINE

## 2022-01-31 PROCEDURE — 99495 TRANSJ CARE MGMT MOD F2F 14D: CPT | Performed by: FAMILY MEDICINE

## 2022-01-31 RX ORDER — ATORVASTATIN CALCIUM 40 MG/1
40 TABLET, FILM COATED ORAL DAILY
Qty: 90 TABLET | Refills: 3 | Status: SHIPPED | OUTPATIENT
Start: 2022-01-31 | End: 2022-11-17

## 2022-01-31 RX ORDER — AMLODIPINE BESYLATE 2.5 MG/1
5 TABLET ORAL
Qty: 30 TABLET | Refills: 1 | Status: SHIPPED | OUTPATIENT
Start: 2022-01-31 | End: 2022-02-07 | Stop reason: SDUPTHER

## 2022-01-31 NOTE — TELEPHONE ENCOUNTER
Spoke with patient to schedule a hospital 3 month hospital follow up appointment with Dr. Riddle.  Patient requested an afternoon appointment.  Scheduled for the first afternoon appointment available.  Also notified him that CTA head and neck was ordered.  Mailing paperwork.

## 2022-01-31 NOTE — OUTREACH NOTE
Call Center TCM Note      Responses   [facility] patient discharged from? [place]   Does the patient have one of the following disease processes/diagnoses(primary or secondary)? Stroke (TIA)   TCM attempt successful? Yes   Call start time 1559   Call end time 1601   Discharge diagnosis Acute CVA   Meds reviewed with patient/caregiver? Yes   Is the patient having any side effects they believe may be caused by any medication additions or changes? No   Does the patient have all medications ordered at discharge? Yes   Is the patient taking all medications as directed (includes completed medication regime)? Yes   Does the patient have a primary care provider?  Yes   Does the patient have an appointment with their PCP within 7 days of discharge? Yes   Comments regarding PCP 1-31-22 today with PCP    Has the patient kept scheduled appointments due by today? Yes   Psychosocial issues? No   Does the patient require any assistance with activities of daily living such as eating, bathing, dressing, walking, etc.? No   Does the patient have any residual symptoms from stroke/TIA? No   Did the patient receive a copy of their discharge instructions? Yes   Nursing interventions Reviewed instructions with patient   What is the patient's perception of their health status since discharge? Improving   Nursing interventions Nurse provided patient education   Is the patient able to teach back FAST for Stroke? Yes   Is the patient/caregiver able to teach back the risk factors for a stroke? High blood pressure-goal below 120/80,  Smoking   Is the patient/caregiver able to teach back signs and symptoms related to disease process for when to call PCP? Yes   Is the patient/caregiver able to teach back signs and symptoms related to disease process for when to call 911? Yes   If the patient is a current smoker, are they able to teach back resources for cessation? Not a smoker   Is the patient/caregiver able to teach back the hierarchy  of who to call/visit for symptoms/problems? PCP, Specialist, Home health nurse, Urgent Care, ED, 911 Yes   TCM call completed? Yes          Sanjana Klein RN    1/31/2022, 16:02 EST

## 2022-02-07 ENCOUNTER — TELEPHONE (OUTPATIENT)
Dept: INTERNAL MEDICINE | Facility: CLINIC | Age: 82
End: 2022-02-07

## 2022-02-07 DIAGNOSIS — I10 PRIMARY HYPERTENSION: Primary | ICD-10-CM

## 2022-02-07 DIAGNOSIS — E78.5 HYPERLIPIDEMIA, UNSPECIFIED HYPERLIPIDEMIA TYPE: ICD-10-CM

## 2022-02-07 RX ORDER — AMLODIPINE BESYLATE 5 MG/1
5 TABLET ORAL DAILY
Qty: 90 TABLET | Refills: 3 | Status: SHIPPED | OUTPATIENT
Start: 2022-02-07 | End: 2022-02-28

## 2022-02-07 RX ORDER — HYDROCHLOROTHIAZIDE 12.5 MG/1
TABLET ORAL
Qty: 60 TABLET | Refills: 1 | Status: SHIPPED | OUTPATIENT
Start: 2022-02-07 | End: 2022-05-24

## 2022-02-07 NOTE — TELEPHONE ENCOUNTER
[facility] for pt's son Titus (on HIPAA), as he was previously contacted re: changes and pt's presumed confusion re: changes.    I advised that pt does not need to be seen prior to upcoming nurse visit with me, 2/28/2022, and he was able to repeat instructions to me x 2, as well as confirm upcoming nurse visit.    I advised Titus to contact office if he has concerns or questions, but that pt is stable for now and has understood instructions.

## 2022-02-07 NOTE — TELEPHONE ENCOUNTER
PATIENT HAS BEEN TAKING HIS BP AS REQUESTED BY DR CAMPBELL. TOOK BP IN THE MORNING AND IN THE EVENIN/31: AM: 181/86 - PM: 180/99 /85  : AM: 162/87 - PM: 175/78  0202: AM: 187/96 - PM: 172/77  02: AM: 177/80 - PM: 169/87  02: AM: 166/85 - PM: DID NOT CHECK  : AM: 160/88 - PM: 171/80  02: AM: 141/75 - PM: 161/73  02: AM: 152/71    NEEDS DIRECTIONS ON NEW PRESCRIPTIONS.    PLEASE ADVISE    [phone]

## 2022-02-07 NOTE — TELEPHONE ENCOUNTER
S/w pt and advised him of new medication. Pt was confused about which medications were sent to pharmacy after his appt 1/31/2022 and what was from the hospital.     I advised pt that the only change in medication was HCTZ, which is to be taken 1 time daily, and that is has been sent to pharmacy.    Pt requested a refill of new dose of amlodipine, 5mg 1QD, which was updated and sent to pharmacy.    Pt was able to summarize changes and upcoming BP visit 2/28/2022 clearly, and is agreeable to changes and continuing previous BP medications.

## 2022-02-07 NOTE — TELEPHONE ENCOUNTER
Spoke to patient, he is unsure on taking the HCTZ. Stated it is not the right medication for him, requesting to speak with Dr. Sykes personally. Confused as to if he should continue taking Amlodipine 5 MG. Stated he is confused on which medication he should actually be taking daily. I advised him a OV in person to review all medications with Dr. Sykes is the best option to discuss all of his medication questions. He is frustrated, kept stating he wants to speak directly with Dr. Sykes not a MA. He is aware DW is seeing patients.     Please advise,     Thanks!

## 2022-02-07 NOTE — TELEPHONE ENCOUNTER
I would like to have him start a low-dose medication called hydrochlorothiazide for this    It is a water pill and can cause him to urinate more frequently but generally is tolerated well    He has actually been on this before    I sent in a low-dose, he can continue to send me blood pressure logs after this change.  If he starts to get dizzy or feels that his blood pressure is low he needs to let us know

## 2022-02-08 ENCOUNTER — READMISSION MANAGEMENT (OUTPATIENT)
Dept: CALL CENTER | Facility: HOSPITAL | Age: 82
End: 2022-02-08

## 2022-02-08 NOTE — OUTREACH NOTE
Stroke Week 2 Survey      Responses   [facility] patient discharged from? [place]   Does the patient have one of the following disease processes/diagnoses(primary or secondary)? Stroke (TIA)   Week 2 attempt successful? Yes   Call start time 0856   Call end time 0905   Discharge diagnosis Acute CVA   Is patient permission given to speak with other caregiver? No   Meds reviewed with patient/caregiver? Yes   Does the patient have all medications ordered at discharge? Yes   Is the patient taking all medications as directed (includes completed medication regime)? Yes   Medication comments Patient has had adjustment with dose increase with amlodipine and addition of HCTZ per PCP.    Does the patient have a primary care provider?  Yes   Comments regarding PCP Patient has seen PCP since discharge.    Has the patient kept scheduled appointments due by today? Yes   Comments Neuro appt 5/25/22   Has home health visited the patient within 72 hours of discharge? N/A   Psychosocial issues? No   Does the patient require any assistance with activities of daily living such as eating, bathing, dressing, walking, etc.? No   Does the patient have any residual symptoms from stroke/TIA? No   Residual symptoms comments Patient reports back to baseline.    Does the patient understand the diet ordered at discharge? Yes   Did the patient receive a copy of their discharge instructions? Yes   Nursing interventions Reviewed instructions with patient   What is the patient's perception of their health status since discharge? Returned to baseline/stable   Nursing interventions Nurse provided patient education   Is the patient able to teach back FAST for Stroke? Yes   Is the patient/caregiver able to teach back the risk factors for a stroke? High blood pressure-goal below 120/80,  High Cholesterol,  Excessive alcohol intake,  Carotid or other artery disease,  Smoking,  Diabetes   Is the patient/caregiver able to teach back signs and  symptoms related to disease process for when to call PCP? Yes   Is the patient/caregiver able to teach back signs and symptoms related to disease process for when to call 911? Yes   If the patient is a current smoker, are they able to teach back resources for cessation? Not a smoker   Is the patient/caregiver able to teach back the hierarchy of who to call/visit for symptoms/problems? PCP, Specialist, Home health nurse, Urgent Care, ED, 911 Yes   Week 2 call completed? Yes          Tanisha Teresa RN   Statement Selected

## 2022-02-09 ENCOUNTER — TELEPHONE (OUTPATIENT)
Dept: INTERNAL MEDICINE | Facility: CLINIC | Age: 82
End: 2022-02-09

## 2022-02-09 NOTE — TELEPHONE ENCOUNTER
PATIENT STATES: THAT HE WOULD LIKE TO KNOW IF HE NEEDS TO STILL TAKE  atorvastatin (LIPITOR) 40 MG tablet PLEASE ADVISE          PATIENT CAN BE REACHED ON: [phone]

## 2022-02-09 NOTE — TELEPHONE ENCOUNTER
Patient aware he should consider taking the Atorvastatin 40 mg, patient is understanding. No further questions at this time.

## 2022-02-16 ENCOUNTER — READMISSION MANAGEMENT (OUTPATIENT)
Dept: CALL CENTER | Facility: HOSPITAL | Age: 82
End: 2022-02-16

## 2022-02-16 NOTE — OUTREACH NOTE
Stroke Week 3 Survey      Responses   [facility] patient discharged from? [place]   Does the patient have one of the following disease processes/diagnoses(primary or secondary)? Stroke (TIA)   Week 3 attempt successful? Yes   Call start time 1139   Call end time 1141   Discharge diagnosis Acute CVA   Is patient permission given to speak with other caregiver? No   Meds reviewed with patient/caregiver? Yes   Is the patient having any side effects they believe may be caused by any medication additions or changes? No   Does the patient have all medications ordered at discharge? Yes   Is the patient taking all medications as directed (includes completed medication regime)? Yes   Does the patient have a primary care provider?  Yes   Does the patient have an appointment with their PCP within 7 days of discharge? Yes   Has the patient kept scheduled appointments due by today? Yes   Has home health visited the patient within 72 hours of discharge? N/A   Psychosocial issues? No   Does the patient have any residual symptoms from stroke/TIA? No   Did the patient receive a copy of their discharge instructions? Yes   Nursing interventions Reviewed instructions with patient   What is the patient's perception of their health status since discharge? Returned to baseline/stable   Nursing interventions Nurse provided patient education   Is the patient able to teach back FAST for Stroke? Yes   Is the patient/caregiver able to teach back the risk factors for a stroke? High blood pressure-goal below 120/80,  High Cholesterol,  Excessive alcohol intake,  Carotid or other artery disease,  Smoking,  Diabetes   Is the patient/caregiver able to teach back signs and symptoms related to disease process for when to call PCP? Yes   Is the patient/caregiver able to teach back signs and symptoms related to disease process for when to call 911? Yes   If the patient is a current smoker, are they able to teach back resources for cessation?  Not a smoker   Is the patient/caregiver able to teach back the hierarchy of who to call/visit for symptoms/problems? PCP, Specialist, Home health nurse, Urgent Care, ED, 911 Yes   Week 3 call completed? Yes   Wrap up additional comments Patient is doing well. Monitoring B/P readings and keeping log for MD.           Tomi Flores RN

## 2022-02-19 DIAGNOSIS — I10 ESSENTIAL HYPERTENSION: ICD-10-CM

## 2022-02-21 RX ORDER — LISINOPRIL 40 MG/1
TABLET ORAL
Qty: 90 TABLET | Refills: 0 | Status: SHIPPED | OUTPATIENT
Start: 2022-02-21 | End: 2022-05-31

## 2022-02-21 RX ORDER — ATENOLOL 50 MG/1
TABLET ORAL
Qty: 90 TABLET | Refills: 0 | Status: SHIPPED | OUTPATIENT
Start: 2022-02-21 | End: 2022-05-18

## 2022-02-25 ENCOUNTER — READMISSION MANAGEMENT (OUTPATIENT)
Dept: CALL CENTER | Facility: HOSPITAL | Age: 82
End: 2022-02-25

## 2022-02-25 NOTE — OUTREACH NOTE
Stroke Week 4 Survey      Responses   [facility] patient discharged from? [place]   Does the patient have one of the following disease processes/diagnoses(primary or secondary)? Stroke (TIA)   Week 4 attempt successful? Yes   Call start time 1609   Call end time 1609   Discharge diagnosis Acute CVA   Is patient permission given to speak with other caregiver? No   Meds reviewed with patient/caregiver? Yes   Is the patient having any side effects they believe may be caused by any medication additions or changes? No   Is the patient taking all medications as directed (includes completed medication regime)? Yes   Has the patient kept scheduled appointments due by today? Yes   Comments Neuro appt 5/25/22   Psychosocial issues? No   Does the patient require any assistance with activities of daily living such as eating, bathing, dressing, walking, etc.? No   Does the patient have any residual symptoms from stroke/TIA? No   Does the patient understand the diet ordered at discharge? Yes   What is the patient's perception of their health status since discharge? Returned to baseline/stable   Nursing interventions Nurse provided patient education   Is the patient able to teach back FAST for Stroke? Yes   Is the patient/caregiver able to teach back the risk factors for a stroke? High blood pressure-goal below 120/80,  High Cholesterol,  Excessive alcohol intake,  Carotid or other artery disease,  Smoking,  Diabetes   Is the patient/caregiver able to teach back signs and symptoms related to disease process for when to call PCP? Yes   Is the patient/caregiver able to teach back signs and symptoms related to disease process for when to call 911? Yes   If the patient is a current smoker, are they able to teach back resources for cessation? Not a smoker   Is the patient/caregiver able to teach back the hierarchy of who to call/visit for symptoms/problems? PCP, Specialist, Home health nurse, Urgent Care, ED, 911 Yes   Week 4  Call Completed? Yes   Would the patient like one additional call? No   Graduated Yes   Is the patient interested in additional calls from an ambulatory ?  NOTE:  applies to high risk patients requiring additional follow-up. No   Did the patient feel the follow up calls were helpful during their recovery period? Yes   Was the number of calls appropriate? Yes   Wrap up additional comments Patient is doing well. Monitoring B/P readings and keeping log for MD. Tomi Flores RN

## 2022-02-28 ENCOUNTER — CLINICAL SUPPORT (OUTPATIENT)
Dept: INTERNAL MEDICINE | Facility: CLINIC | Age: 82
End: 2022-02-28

## 2022-02-28 VITALS — SYSTOLIC BLOOD PRESSURE: 162 MMHG | DIASTOLIC BLOOD PRESSURE: 72 MMHG

## 2022-02-28 DIAGNOSIS — I10 PRIMARY HYPERTENSION: ICD-10-CM

## 2022-02-28 PROCEDURE — 99211 OFF/OP EST MAY X REQ PHY/QHP: CPT | Performed by: FAMILY MEDICINE

## 2022-02-28 RX ORDER — AMLODIPINE BESYLATE 5 MG/1
10 TABLET ORAL DAILY
Qty: 90 TABLET | Refills: 3 | Status: SHIPPED | OUTPATIENT
Start: 2022-02-28 | End: 2022-03-11 | Stop reason: SDUPTHER

## 2022-02-28 NOTE — PROGRESS NOTES
Pt came in to office for BP check.     GLADYS  Sitting  Large Cuff  1145: 162/72    Pt and I confirmed medication list as accurate.     Pt also provided BP log for last month; available under media.    Pt did ask if there was any way to increase ambien. C/o having trouble staying asleep lately.

## 2022-03-02 ENCOUNTER — TELEPHONE (OUTPATIENT)
Dept: INTERNAL MEDICINE | Facility: CLINIC | Age: 82
End: 2022-03-02

## 2022-03-02 NOTE — TELEPHONE ENCOUNTER
----- Message from Ector Sykes MD sent at 2/28/2022 10:00 PM EST -----      ----- Message -----  From: Charissa Medrano LPN  Sent: 2/28/2022  11:48 AM EST  To: Ector Sykes MD

## 2022-03-11 ENCOUNTER — TELEPHONE (OUTPATIENT)
Dept: INTERNAL MEDICINE | Facility: CLINIC | Age: 82
End: 2022-03-11

## 2022-03-11 DIAGNOSIS — I10 PRIMARY HYPERTENSION: ICD-10-CM

## 2022-03-11 RX ORDER — AMLODIPINE BESYLATE 10 MG/1
10 TABLET ORAL DAILY
Qty: 90 TABLET | Refills: 3 | Status: SHIPPED | OUTPATIENT
Start: 2022-03-11 | End: 2023-04-04

## 2022-03-11 NOTE — TELEPHONE ENCOUNTER
PATIENT IS CALLING IN HE IS ASKING IF WHEN HE GETS NEXT REFILL ON THE AMLODPINE CAN HE JUST GET 10MG INSTEAD OF THESE 5MG TAKING TWO OF THOSE.       HE THINKS THE 10MG WOULD BE EASIER TO DEAL WITH AND HE WONT GO THROUGH THEM AS FAST.    PLEASE ADVISE   [phone]      HE WOULD LIKE CALLBACK TO KNOW WHAT DOCTOR SAYS.

## 2022-03-14 DIAGNOSIS — G47.00 INSOMNIA, UNSPECIFIED TYPE: ICD-10-CM

## 2022-03-14 RX ORDER — ZOLPIDEM TARTRATE 6.25 MG/1
TABLET, FILM COATED, EXTENDED RELEASE ORAL
Qty: 90 TABLET | Refills: 0 | Status: SHIPPED | OUTPATIENT
Start: 2022-04-06 | End: 2022-06-23

## 2022-03-14 NOTE — TELEPHONE ENCOUNTER
Rx Refill Note  Requested Prescriptions     Pending Prescriptions Disp Refills   • zolpidem CR (AMBIEN CR) 6.25 MG CR tablet [Pharmacy Med Name: ZOLPIDEM TART ER 6.25 MG TAB] 90 tablet      Sig: TAKE ONE TABLET BY MOUTH ONCE NIGHTLY AS NEEDED FOR SLEEP      Last office visit with prescribing clinician: 1/31/2022      Next office visit with prescribing clinician: 4/7/2022        Tox[facility] Select 13 (MW) - Urine, Clean Catch (03/30/2021 13:32)      Charissa Medrano LPN  03/14/22, 15:34 EDT

## 2022-03-29 ENCOUNTER — TELEPHONE (OUTPATIENT)
Dept: INTERNAL MEDICINE | Facility: CLINIC | Age: 82
End: 2022-03-29

## 2022-03-29 NOTE — TELEPHONE ENCOUNTER
Provider: DR CAMPBELL  Caller: LUPE RIOS  Relationship to Patient: PATIENT  Phone Number: [phone]  Reason for Call: PATIENT HAS MEDICARE ADVANTAGE INSURANCE.  HE HAS RECEIVED A NOTICE THAT DR CAMPBELL IS NO LONGER COVERED IN THAT PLAN.  HE WOULD LIKE CLARIFICATION ON THIS BECAUSE HE WOULD RATHER NOT HAVE TO SWITCH PROVIDERS.

## 2022-05-09 ENCOUNTER — HOSPITAL ENCOUNTER (OUTPATIENT)
Dept: CT IMAGING | Facility: HOSPITAL | Age: 82
Discharge: HOME | End: 2022-05-09
Admitting: PSYCHIATRY & NEUROLOGY

## 2022-05-09 ENCOUNTER — APPOINTMENT (OUTPATIENT)
Dept: CT IMAGING | Facility: HOSPITAL | Age: 82
End: 2022-05-09

## 2022-05-09 ENCOUNTER — TELEPHONE (OUTPATIENT)
Dept: INTERNAL MEDICINE | Facility: CLINIC | Age: 82
End: 2022-05-09

## 2022-05-09 DIAGNOSIS — R73.09 ELEVATED GLUCOSE: ICD-10-CM

## 2022-05-09 DIAGNOSIS — I63.9 ACUTE CVA (CEREBROVASCULAR ACCIDENT): ICD-10-CM

## 2022-05-09 DIAGNOSIS — N18.31 STAGE 3A CHRONIC KIDNEY DISEASE: ICD-10-CM

## 2022-05-09 DIAGNOSIS — E78.5 HYPERLIPIDEMIA, UNSPECIFIED HYPERLIPIDEMIA TYPE: Primary | ICD-10-CM

## 2022-05-09 DIAGNOSIS — Z87.39 HISTORY OF GOUT: ICD-10-CM

## 2022-05-09 LAB — CREAT BLDA-MCNC: 1.3 MG/DL (ref 0.6–1.3)

## 2022-05-09 PROCEDURE — 70496 CT ANGIOGRAPHY HEAD: CPT

## 2022-05-09 PROCEDURE — 82565 ASSAY OF CREATININE: CPT

## 2022-05-09 PROCEDURE — 0 IOPAMIDOL PER 1 ML: Performed by: PSYCHIATRY & NEUROLOGY

## 2022-05-09 PROCEDURE — 70498 CT ANGIOGRAPHY NECK: CPT

## 2022-05-09 RX ADMIN — IOPAMIDOL 95 ML: 755 INJECTION, SOLUTION INTRAVENOUS at 13:27

## 2022-05-10 DIAGNOSIS — N18.31 STAGE 3A CHRONIC KIDNEY DISEASE: ICD-10-CM

## 2022-05-10 DIAGNOSIS — R73.09 ELEVATED GLUCOSE: ICD-10-CM

## 2022-05-10 DIAGNOSIS — Z87.39 HISTORY OF GOUT: ICD-10-CM

## 2022-05-10 DIAGNOSIS — E78.5 HYPERLIPIDEMIA, UNSPECIFIED HYPERLIPIDEMIA TYPE: ICD-10-CM

## 2022-05-11 LAB
25(OH)D3+25(OH)D2 SERPL-MCNC: 36.6 NG/ML (ref 30–100)
ALBUMIN SERPL-MCNC: 3.8 G/DL (ref 3.6–4.6)
ALBUMIN/GLOB SERPL: 1.7 {RATIO} (ref 1.2–2.2)
ALP SERPL-CCNC: 73 IU/L (ref 44–121)
ALT SERPL-CCNC: 16 IU/L (ref 0–44)
AST SERPL-CCNC: 25 IU/L (ref 0–40)
BASOPHILS # BLD AUTO: 0 X10E3/UL (ref 0–0.2)
BASOPHILS NFR BLD AUTO: 1 %
BILIRUB SERPL-MCNC: 0.4 MG/DL (ref 0–1.2)
BUN SERPL-MCNC: 22 MG/DL (ref 8–27)
BUN/CREAT SERPL: 17 (ref 10–24)
CALCIUM SERPL-MCNC: 9.4 MG/DL (ref 8.6–10.2)
CHLORIDE SERPL-SCNC: 105 MMOL/L (ref 96–106)
CHOLEST SERPL-MCNC: 165 MG/DL (ref 100–199)
CO2 SERPL-SCNC: 24 MMOL/L (ref 20–29)
CREAT SERPL-MCNC: 1.26 MG/DL (ref 0.76–1.27)
EGFRCR SERPLBLD CKD-EPI 2021: 56 ML/MIN/1.73
EOSINOPHIL # BLD AUTO: 0.3 X10E3/UL (ref 0–0.4)
EOSINOPHIL NFR BLD AUTO: 3 %
ERYTHROCYTE [DISTWIDTH] IN BLOOD BY AUTOMATED COUNT: 13 % (ref 11.6–15.4)
FT4I SERPL CALC-MCNC: 2.5 (ref 1.2–4.9)
GLOBULIN SER CALC-MCNC: 2.2 G/DL (ref 1.5–4.5)
GLUCOSE SERPL-MCNC: 95 MG/DL (ref 65–99)
HBA1C MFR BLD: 4.9 % (ref 4.8–5.6)
HCT VFR BLD AUTO: 38.3 % (ref 37.5–51)
HDLC SERPL-MCNC: 42 MG/DL
HGB BLD-MCNC: 12.9 G/DL (ref 13–17.7)
IMM GRANULOCYTES # BLD AUTO: 0 X10E3/UL (ref 0–0.1)
IMM GRANULOCYTES NFR BLD AUTO: 1 %
LDLC SERPL CALC-MCNC: 101 MG/DL (ref 0–99)
LYMPHOCYTES # BLD AUTO: 1.6 X10E3/UL (ref 0.7–3.1)
LYMPHOCYTES NFR BLD AUTO: 21 %
MCH RBC QN AUTO: 29.9 PG (ref 26.6–33)
MCHC RBC AUTO-ENTMCNC: 33.7 G/DL (ref 31.5–35.7)
MCV RBC AUTO: 89 FL (ref 79–97)
MONOCYTES # BLD AUTO: 0.6 X10E3/UL (ref 0.1–0.9)
MONOCYTES NFR BLD AUTO: 8 %
NEUTROPHILS # BLD AUTO: 5.1 X10E3/UL (ref 1.4–7)
NEUTROPHILS NFR BLD AUTO: 66 %
PLATELET # BLD AUTO: 219 X10E3/UL (ref 150–450)
POTASSIUM SERPL-SCNC: 5.1 MMOL/L (ref 3.5–5.2)
PROT SERPL-MCNC: 6 G/DL (ref 6–8.5)
RBC # BLD AUTO: 4.31 X10E6/UL (ref 4.14–5.8)
SODIUM SERPL-SCNC: 142 MMOL/L (ref 134–144)
T3RU NFR SERPL: 30 % (ref 24–39)
T4 SERPL-MCNC: 8.4 UG/DL (ref 4.5–12)
TRIGL SERPL-MCNC: 123 MG/DL (ref 0–149)
TSH SERPL DL<=0.005 MIU/L-ACNC: 4 UIU/ML (ref 0.45–4.5)
URATE SERPL-MCNC: 7.4 MG/DL (ref 3.8–8.4)
VLDLC SERPL CALC-MCNC: 22 MG/DL (ref 5–40)
WBC # BLD AUTO: 7.7 X10E3/UL (ref 3.4–10.8)

## 2022-05-17 ENCOUNTER — OFFICE VISIT (OUTPATIENT)
Dept: INTERNAL MEDICINE | Facility: CLINIC | Age: 82
End: 2022-05-17

## 2022-05-17 VITALS
OXYGEN SATURATION: 97 % | WEIGHT: 253.2 LBS | SYSTOLIC BLOOD PRESSURE: 126 MMHG | BODY MASS INDEX: 35.45 KG/M2 | DIASTOLIC BLOOD PRESSURE: 60 MMHG | HEART RATE: 59 BPM | HEIGHT: 71 IN | TEMPERATURE: 96.9 F

## 2022-05-17 DIAGNOSIS — E78.5 HYPERLIPIDEMIA, UNSPECIFIED HYPERLIPIDEMIA TYPE: ICD-10-CM

## 2022-05-17 DIAGNOSIS — I10 PRIMARY HYPERTENSION: Primary | ICD-10-CM

## 2022-05-17 DIAGNOSIS — K59.00 CONSTIPATION, UNSPECIFIED CONSTIPATION TYPE: ICD-10-CM

## 2022-05-17 DIAGNOSIS — K51.90 ULCERATIVE COLITIS WITHOUT COMPLICATIONS, UNSPECIFIED LOCATION: ICD-10-CM

## 2022-05-17 DIAGNOSIS — Z23 NEED FOR COVID-19 VACCINE: ICD-10-CM

## 2022-05-17 DIAGNOSIS — N18.31 STAGE 3A CHRONIC KIDNEY DISEASE: ICD-10-CM

## 2022-05-17 PROCEDURE — 0053A COVID-19 (PFIZER) 12+ YRS: CPT | Performed by: FAMILY MEDICINE

## 2022-05-17 PROCEDURE — 91305 COVID-19 (PFIZER) 12+ YRS: CPT | Performed by: FAMILY MEDICINE

## 2022-05-17 PROCEDURE — 99213 OFFICE O/P EST LOW 20 MIN: CPT | Performed by: FAMILY MEDICINE

## 2022-05-17 NOTE — PROGRESS NOTES
Date of Encounter: 2022  Patient: Bernard Resendez,  1940    Subjective   History of Presenting Illness  Chief complaint: Hypertension    Hypertension well-controlled on current regimen.  Systolic blood pressures at home 110s to 130s systolic.  Very rare episodes above 150.  Tolerating medication with no side effects.    Constipation especially with recent intermittent fasting diet that has been effective for him.  His water intake is poor.  He does not eat a lot of vegetables or fiber containing foods.  Not currently using any constipation medications.    Ulcerative colitis with no recent flare, due for colonoscopy this year for surveillance.  Last colonoscopy 2 years ago showed 2 polyps that on pathology were benign gastric mucosa.    Hyperlipidemia, tolerating atorvastatin well with no side effects.  Total cholesterol has dropped by over 100 mg/dL.    Due for COVID-19 vaccine #4.  He had #3 but cannot tell me the time     Review of Systems:  Negative for fever, cough, shortness of breath    The following portions of the patient's history were reviewed and updated as appropriate: allergies, current medications, past family history, past medical history, past social history, past surgical history and problem list.    Patient Active Problem List   Diagnosis   • Stage 3a chronic kidney disease (HCC)   • Hyperlipidemia   • Hypogonadism in male   • Insomnia d/t anxiety   • Osteopenia   • Hypertension   • Colon polyps   • Ulcerative colitis without complications (HCC)   • Venous stasis dermatitis of both lower extremities   • Obesity, Class I, BMI 30-34.9   • DNR (do not resuscitate)   • History of gout   • Meningioma (HCC)   • Cerebrovascular accident (CVA) of pontine structure (HCC)   • Mild cognitive impairment     Past Medical History:   Diagnosis Date   • Acute CVA (cerebrovascular accident) (HCC) 2022   • Acute renal failure (HCC)    • Cancer (HCC)     SKIN LESION REMOVED   • Diverticulosis    •  Gout 2/13/2016   • Hearing difficulty    • History of shingles    • Hyperlipidemia    • Hypertension    • Hypogonadism in male    • Insomnia    • Nephrolithiasis    • Osteopenia    • Osteopenia    • Pruritus 1/15/2021   • Renal insufficiency    • Ulcerative colitis (HCC)      Past Surgical History:   Procedure Laterality Date   • CIRCUMCISION N/A 6/16/2021    Procedure: CIRCUMCISION;  Surgeon: Milton Forrest MD;  Location: [facility] MAIN OR;  Service: Urology;  Laterality: N/A;   • COLONOSCOPY  06/11/2015    NBIH, actuve colitis w/cryptitis, rare crypt abscess & superficial erosion, hyperplastic polyp   • COLONOSCOPY N/A 8/8/2017    IH, nodular mucosa in the proximal rectum and in the distal rectum, Diverticulosis in the sigmoid colon, the descending colon and at the splenic flexure.  PATH: Hyperplastic polyps    • COLONOSCOPY N/A 11/19/2019    Procedure: COLONOSCOPY to cecum with biopsies;  Surgeon: Calvin Gifford MD;  Location: [facility] ENDOSCOPY;  Service: Gastroenterology   • EYE SURGERY      cataract   • KIDNEY STONE SURGERY     • TUMOR EXCISION      rectal tumor     Family History   Problem Relation Age of Onset   • Hypertension Mother    • Stroke Mother    • Heart disease Mother    • Asthma Sister    • Ulcerative colitis Sister    • Emphysema Sister    • Lung disease Father         mesothelioma   • Glaucoma Father    • Diabetes Son    • Malig Hyperthermia Neg Hx        Current Outpatient Medications:   •  amLODIPine (NORVASC) 10 MG tablet, Take 1 tablet by mouth Daily., Disp: 90 tablet, Rfl: 3  •  aspirin 81 MG chewable tablet, Chew 1 tablet Daily., Disp: 30 tablet, Rfl: 4  •  atenolol (TENORMIN) 50 MG tablet, TAKE ONE TABLET BY MOUTH DAILY, Disp: 90 tablet, Rfl: 0  •  atorvastatin (LIPITOR) 40 MG tablet, Take 1 tablet by mouth Daily., Disp: 90 tablet, Rfl: 3  •  Cholecalciferol (VITAMIN D3) 1000 UNITS capsule, Take 1,000 Units by mouth Daily., Disp: , Rfl:   •  Cyanocobalamin (Vitamin B12) 1000 MCG tablet  "controlled-release, Take 1,000 mcg by mouth Daily., Disp: , Rfl:   •  hydroCHLOROthiazide (HYDRODIURIL) 12.5 MG tablet, Take 1 tab PO QD for blood pressure, Disp: 60 tablet, Rfl: 1  •  lisinopril (PRINIVIL,ZESTRIL) 40 MG tablet, TAKE ONE TABLET BY MOUTH DAILY, Disp: 90 tablet, Rfl: 0  •  Multiple Vitamins-Minerals (CENTRUM SILVER ADULT 50+ PO), Take 1 tablet by mouth Daily. HOLD PER MD INSTR, Disp: , Rfl:   •  Omega-3 Fatty Acids (FISH OIL) 1200 MG capsule capsule, Take 1,200 mg by mouth Daily With Breakfast. HOLD PER MD INSTR, Disp: , Rfl:   •  sulfaSALAzine (AZULFIDINE) 500 MG tablet, TAKE TWO TABLETS BY MOUTH THREE TIMES A DAY, Disp: 180 tablet, Rfl: 11  •  vitamin C (ASCORBIC ACID) 500 MG tablet, Take 1,000 mg by mouth Daily. HOLD PER MD INSTR, Disp: , Rfl:   •  Wheat Dextrin (BENEFIBER PO), Take  by mouth., Disp: , Rfl:   •  zolpidem CR (AMBIEN CR) 6.25 MG CR tablet, TAKE ONE TABLET BY MOUTH ONCE NIGHTLY AS NEEDED FOR SLEEP, Disp: 90 tablet, Rfl: 0  Allergies   Allergen Reactions   • Ciprofloxacin Rash     Pt unsure reaction     Social History     Tobacco Use   • Smoking status: Former Smoker     Packs/day: 0.25     Years: 20.00     Pack years: 5.00     Types: Cigarettes     Quit date:      Years since quittin.4   • Smokeless tobacco: Former User   Vaping Use   • Vaping Use: Never used   Substance Use Topics   • Alcohol use: Yes     Alcohol/week: 2.0 standard drinks     Types: 2 Shots of liquor per week     Comment: socially   • Drug use: No          Objective   Physical Exam  Vitals:    22 1313   BP: 126/60   BP Location: Left arm   Patient Position: Sitting   Cuff Size: Large Adult   Pulse: 59   Temp: 96.9 °F (36.1 °C)   TempSrc: Temporal   SpO2: 97%   Weight: 115 kg (253 lb 3.2 oz)   Height: 180.3 cm (71\")     Body mass index is 35.31 kg/m².    Constitutional: NAD.  Cardiovascular: RRR. No murmurs. No LE edema b/l. Radial pulses 2+ bilaterally.  Pulmonary: CTA b/l. Good effort.  Psychiatric: " Normal affect. Normal thought content.     Assessment & Plan   Assessment and Plan  Pleasant 82-year-old male with ulcerative colitis, pontine CVA, meningioma, hypertension, hyperlipidemia, hypogonadism, osteopenia, CKD 3, insomnia, history of gout, who presents with the following:    Diagnoses and all orders for this visit:    1. Primary hypertension (Primary): Controlled    2. Constipation, unspecified constipation type: Recommend increased fiber in diet as well as Benefiber powder    3. Hyperlipidemia, unspecified hyperlipidemia type: Significantly improved on atorvastatin 40 mg, recommend to continue current dose as tolerated as 80 mg may have higher risk of side effects.    4. Stage 3a chronic kidney disease (HCC): Stable longitudinally.  Avoid unnecessary NSAIDs.    5. Ulcerative colitis without complications, unspecified location (HCC): Follow-up with Dr. Gifford for colonoscopy, he can discuss with him whether he needs any further follow-up.  I am uncertain that any additional colonoscopies will benefit this patient.    6. Need for COVID-19 vaccine  -     COVID-19 Vaccine (Pfizer) Gray Cap    Follow-up in 6 months for Medicare wellness    Ector Sykes MD  Family Medicine  O: [phone]  C: [phone]    Disclaimer: Parts of this note were dictated by speech recognition. Minor errors in transcription may be present. Please call if questions.

## 2022-05-18 DIAGNOSIS — I10 ESSENTIAL HYPERTENSION: ICD-10-CM

## 2022-05-18 RX ORDER — ATENOLOL 50 MG/1
TABLET ORAL
Qty: 90 TABLET | Refills: 0 | Status: SHIPPED | OUTPATIENT
Start: 2022-05-18 | End: 2022-08-17

## 2022-05-24 DIAGNOSIS — I10 PRIMARY HYPERTENSION: ICD-10-CM

## 2022-05-24 RX ORDER — HYDROCHLOROTHIAZIDE 12.5 MG/1
TABLET ORAL
Qty: 60 TABLET | Refills: 1 | Status: SHIPPED | OUTPATIENT
Start: 2022-05-24 | End: 2022-09-20

## 2022-05-25 ENCOUNTER — OFFICE VISIT (OUTPATIENT)
Dept: NEUROLOGY | Facility: CLINIC | Age: 82
End: 2022-05-25

## 2022-05-25 VITALS
WEIGHT: 254 LBS | BODY MASS INDEX: 35.56 KG/M2 | HEART RATE: 61 BPM | OXYGEN SATURATION: 96 % | HEIGHT: 71 IN | SYSTOLIC BLOOD PRESSURE: 112 MMHG | DIASTOLIC BLOOD PRESSURE: 64 MMHG

## 2022-05-25 DIAGNOSIS — I69.30 CHRONIC ARTERIAL ISCHEMIC STROKE: Primary | ICD-10-CM

## 2022-05-25 DIAGNOSIS — K51.90 ULCERATIVE COLITIS WITHOUT COMPLICATIONS, UNSPECIFIED LOCATION: ICD-10-CM

## 2022-05-25 DIAGNOSIS — I67.2 INTRACRANIAL ATHEROSCLEROSIS: ICD-10-CM

## 2022-05-25 DIAGNOSIS — Z91.81 AT LOW RISK FOR FALL: ICD-10-CM

## 2022-05-25 DIAGNOSIS — D32.9 MENINGIOMA: Primary | ICD-10-CM

## 2022-05-25 PROCEDURE — 99214 OFFICE O/P EST MOD 30 MIN: CPT | Performed by: PSYCHIATRY & NEUROLOGY

## 2022-05-25 RX ORDER — CLOPIDOGREL BISULFATE 75 MG/1
75 TABLET ORAL DAILY
Qty: 90 TABLET | Refills: 3 | Status: SHIPPED | OUTPATIENT
Start: 2022-05-25

## 2022-05-25 NOTE — PATIENT INSTRUCTIONS

## 2022-05-25 NOTE — PROGRESS NOTES
"DOS: 2022  NAME: Bernard Resendez   : 1940  PCP: Ector Sykes MD  Chief Complaint   Patient presents with   • Stroke       Chief complaint: Stroke follow-up  Subjective: This is an 82-year-old man with a history of ulcerative colitis, hypertension, hyperlipidemia who was seen by me in the hospital in January of this year when he presented with ataxia and bilateral lower extremity weakness that was worse on the left side.  He underwent an initial neurosurgical work-up and was found to have a small meningioma.  As a part of that work-up a right pontine infarct was discovered and when I evaluated him I felt that his deficits correlated best to that lesion.  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  He was started on dual to platelet therapy and continued on that for 3 weeks and then stop Plavix and continue aspirin monotherapy.  He continues on low-dose aspirin with no issues.  He made a full recovery from his infarct with no residual signs or symptoms and no recurrent TIA like episodes in the interval period.    Objective:  Vital signs: /64 (BP Location: Left arm, Patient Position: Sitting, Cuff Size: Adult)   Pulse 61   Ht 180.3 cm (71\")   Wt 115 kg (254 lb)   SpO2 96%   BMI 35.43 kg/m²    Exam:  vitals reviewed  MS: oriented x3, recent/remote memory intact, normal attention/concentration, language intact, no neglect.  CN: visual acuity grossly normal, PERRL, EOMI, no facial droop, no dysarthria  Motor: 5/5 throughout upper and lower extremities, normal tone  Sensory: intact to cold temperature and vibration throughout  Gait: Normal station, no ataxia    Laboratory results:  Lab Results   Component Value Date     (H) 05/10/2022     (H) 10/08/2021     (H) 2021            Review of labs: LDL was 101 when checked on 5/10    Review and interpretation of imaging: I personally reviewed his follow-up CTA head and neck performed " this month which shows stable basilar artery stenosis with no significant change.  Radiology report reviewed.    Diagnoses:  Chronic pontine stroke  Ulcerative colitis  Mixed hyperlipidemia  Essential hypertension  Right frontal meningioma  Intracranial atherosclerosis    Assessment/comments: Patient has made a full recovery from his pontine stroke which is thought to be related to small vessel disease versus due to his underlying basilar artery stenosis.  We will treat him preventatively for intracranial atherosclerosis with Plavix and Lipitor.  I am changing his aspirin back to Plavix today.  He should continue on high-dose Lipitor given his multifocal iCAD.  I will not do long-term DAPT given his underlying UC    Plan:  1.  Switch low-dose aspirin back to his Plavix 75 mg daily  2.  Lipitor 40  3.  Monitor blood pressure daily, goal less than 140/80, avoid hypotension  4.  Follow-up CTA head and neck in 1 year  5.  Follow-up with neurosurgery for meningioma    Follow-up with me in 1 year    I spent a total of 35 minutes on this clinical encounter including chart/records review, review of laboratory data, personal review of imaging studies, patient counseling, and care coordination.

## 2022-05-31 DIAGNOSIS — I10 ESSENTIAL HYPERTENSION: ICD-10-CM

## 2022-05-31 RX ORDER — LISINOPRIL 40 MG/1
TABLET ORAL
Qty: 90 TABLET | Refills: 3 | Status: SHIPPED | OUTPATIENT
Start: 2022-05-31

## 2022-06-23 DIAGNOSIS — G47.00 INSOMNIA, UNSPECIFIED TYPE: ICD-10-CM

## 2022-06-23 RX ORDER — ZOLPIDEM TARTRATE 6.25 MG/1
TABLET, FILM COATED, EXTENDED RELEASE ORAL
Qty: 90 TABLET | Refills: 2 | Status: SHIPPED | OUTPATIENT
Start: 2022-06-23 | End: 2022-12-30

## 2022-06-23 NOTE — TELEPHONE ENCOUNTER
Rx Refill Note  Requested Prescriptions     Pending Prescriptions Disp Refills   • zolpidem CR (AMBIEN CR) 6.25 MG CR tablet [Pharmacy Med Name: ZOLPIDEM TART ER 6.25 MG TAB] 90 tablet      Sig: TAKE ONE TABLET BY MOUTH ONCE NIGHTLY AS NEEDED FOR SLEEP      Last office visit with prescribing clinician: 5/17/2022      Next office visit with prescribing clinician: 11/17/2022     Contract Not On File  UDS Needs Updated   ToxASSURE Select 13 (MW) - Urine, Clean Catch (03/30/2021 13:32)      Charissa Medrano LPN  06/23/22, 10:16 EDT

## 2022-07-18 NOTE — PROGRESS NOTES
Subjective   Patient ID: Bernard Resendez is a 82 y.o. male is here today for follow-up with a New CTA Neck/Head done on 05/09/2022 at [facility].    Today patient states that he feels well overall. Patient denies HA's, lightheadedness, and dizziness.     Patient, Provider, and MA are all wearing a mask in our office today.     History of Present Illness    This patient returns today.  He had a stroke in January and we saw him.  The stroke was in the [place].  At that time he had some balance issues and some leg weakness but he has pretty much recovered from that.  He has no other complaints.    The following portions of the patient's history were reviewed and updated as appropriate: allergies, current medications, past family history, past medical history, past social history, past surgical history and problem list.    Review of Systems   Constitutional: Negative for chills and fever.   HENT: Negative for congestion.    Eyes: Negative for photophobia and visual disturbance.   Neurological: Negative for dizziness, light-headedness and headaches.       I have reviewed the review of systems as documented by my MA.      Objective     There were no vitals filed for this visit.  There is no height or weight on file to calculate BMI.      Physical Exam  Neurological:      Mental Status: He is alert and oriented to person, place, and time.       Neurologic Exam     Mental Status   Oriented to person, place, and time.           Assessment & Plan   Independent Review of Radiographic Studies:      I personally reviewed the images from the following studies.    I reviewed his CT angiogram done in May of this year.  This shows the meningioma in the right frontal floor.  There is peripheral calcification.  There is no change in size of the meningioma from January.    Medical Decision Making:      I told the patient and his wife that we will scan him again in a year.  This is to be sure it is not growing.  I think that is unlikely considering  the calcification.    Diagnoses and all orders for this visit:    1. Meningioma (HCC) (Primary)  -     CT Head With & Without Contrast; Future      Return in about 1 year (around 7/21/2023).

## 2022-07-21 ENCOUNTER — OFFICE VISIT (OUTPATIENT)
Dept: NEUROSURGERY | Facility: CLINIC | Age: 82
End: 2022-07-21

## 2022-07-21 DIAGNOSIS — D32.9 MENINGIOMA: Primary | ICD-10-CM

## 2022-07-21 PROCEDURE — 99213 OFFICE O/P EST LOW 20 MIN: CPT | Performed by: NEUROLOGICAL SURGERY

## 2022-08-17 DIAGNOSIS — I10 ESSENTIAL HYPERTENSION: ICD-10-CM

## 2022-08-17 RX ORDER — ATENOLOL 50 MG/1
TABLET ORAL
Qty: 90 TABLET | Refills: 0 | Status: SHIPPED | OUTPATIENT
Start: 2022-08-17 | End: 2022-11-14

## 2022-09-20 DIAGNOSIS — I10 PRIMARY HYPERTENSION: ICD-10-CM

## 2022-09-20 RX ORDER — HYDROCHLOROTHIAZIDE 12.5 MG/1
TABLET ORAL
Qty: 90 TABLET | Refills: 1 | Status: SHIPPED | OUTPATIENT
Start: 2022-09-20 | End: 2023-03-21

## 2022-09-23 ENCOUNTER — TELEPHONE (OUTPATIENT)
Dept: GASTROENTEROLOGY | Facility: CLINIC | Age: 82
End: 2022-09-23

## 2022-09-23 RX ORDER — SULFASALAZINE 500 MG/1
1000 TABLET ORAL 3 TIMES DAILY
Qty: 180 TABLET | Refills: 11 | Status: SHIPPED | OUTPATIENT
Start: 2022-09-23

## 2022-09-23 NOTE — TELEPHONE ENCOUNTER
Patient called. He state he needs a Sulfasalazine refill. He states it works well for him.     Patient questions if it is necessary for him to have a colonoscopy in November. He states it takes him several days to recuperate from the scopes.     Advised an update will be sent to Dr. Gifford.     He verb understanding.

## 2022-09-26 NOTE — TELEPHONE ENCOUNTER
Per Dr. Gifford:   Not absolutely necessary office visit before the end of the year discussed with me.

## 2022-10-24 ENCOUNTER — TELEPHONE (OUTPATIENT)
Dept: INTERNAL MEDICINE | Facility: CLINIC | Age: 82
End: 2022-10-24

## 2022-10-24 DIAGNOSIS — I10 PRIMARY HYPERTENSION: ICD-10-CM

## 2022-10-24 DIAGNOSIS — N18.31 STAGE 3A CHRONIC KIDNEY DISEASE: ICD-10-CM

## 2022-10-24 DIAGNOSIS — E78.5 HYPERLIPIDEMIA, UNSPECIFIED HYPERLIPIDEMIA TYPE: Primary | ICD-10-CM

## 2022-10-24 DIAGNOSIS — R73.09 ELEVATED GLUCOSE: ICD-10-CM

## 2022-10-24 NOTE — TELEPHONE ENCOUNTER
Pt calling today to request provider put in lab orders so he can get scheduled one week before his AWV so he can discuss labs at appt.     Please advise.

## 2022-11-14 DIAGNOSIS — I10 ESSENTIAL HYPERTENSION: ICD-10-CM

## 2022-11-14 RX ORDER — ATENOLOL 50 MG/1
TABLET ORAL
Qty: 90 TABLET | Refills: 0 | Status: SHIPPED | OUTPATIENT
Start: 2022-11-14 | End: 2023-02-14

## 2022-11-17 ENCOUNTER — OFFICE VISIT (OUTPATIENT)
Dept: INTERNAL MEDICINE | Facility: CLINIC | Age: 82
End: 2022-11-17

## 2022-11-17 VITALS
OXYGEN SATURATION: 97 % | BODY MASS INDEX: 35.59 KG/M2 | SYSTOLIC BLOOD PRESSURE: 112 MMHG | DIASTOLIC BLOOD PRESSURE: 58 MMHG | HEART RATE: 64 BPM | HEIGHT: 71 IN | WEIGHT: 254.2 LBS | TEMPERATURE: 97.1 F

## 2022-11-17 DIAGNOSIS — Z66 DNR (DO NOT RESUSCITATE): ICD-10-CM

## 2022-11-17 DIAGNOSIS — Z00.00 ENCOUNTER FOR SUBSEQUENT ANNUAL WELLNESS VISIT (AWV) IN MEDICARE PATIENT: Primary | ICD-10-CM

## 2022-11-17 DIAGNOSIS — I63.50 CEREBROVASCULAR ACCIDENT (CVA) OF PONTINE STRUCTURE: ICD-10-CM

## 2022-11-17 DIAGNOSIS — Z87.39 HISTORY OF GOUT: ICD-10-CM

## 2022-11-17 DIAGNOSIS — G47.00 INSOMNIA, UNSPECIFIED TYPE: ICD-10-CM

## 2022-11-17 DIAGNOSIS — E66.9 OBESITY, CLASS I, BMI 30-34.9: ICD-10-CM

## 2022-11-17 DIAGNOSIS — N18.31 STAGE 3A CHRONIC KIDNEY DISEASE: ICD-10-CM

## 2022-11-17 DIAGNOSIS — I10 PRIMARY HYPERTENSION: ICD-10-CM

## 2022-11-17 DIAGNOSIS — K51.90 ULCERATIVE COLITIS WITHOUT COMPLICATIONS, UNSPECIFIED LOCATION: ICD-10-CM

## 2022-11-17 DIAGNOSIS — E78.5 HYPERLIPIDEMIA, UNSPECIFIED HYPERLIPIDEMIA TYPE: ICD-10-CM

## 2022-11-17 DIAGNOSIS — D32.9 MENINGIOMA: ICD-10-CM

## 2022-11-17 DIAGNOSIS — M85.80 OSTEOPENIA, UNSPECIFIED LOCATION: ICD-10-CM

## 2022-11-17 PROBLEM — I87.2 VENOUS STASIS DERMATITIS OF BOTH LOWER EXTREMITIES: Status: RESOLVED | Noted: 2021-01-15 | Resolved: 2022-11-17

## 2022-11-17 PROCEDURE — 1159F MED LIST DOCD IN RCRD: CPT | Performed by: FAMILY MEDICINE

## 2022-11-17 PROCEDURE — 1170F FXNL STATUS ASSESSED: CPT | Performed by: FAMILY MEDICINE

## 2022-11-17 PROCEDURE — 1126F AMNT PAIN NOTED NONE PRSNT: CPT | Performed by: FAMILY MEDICINE

## 2022-11-17 PROCEDURE — G0439 PPPS, SUBSEQ VISIT: HCPCS | Performed by: FAMILY MEDICINE

## 2022-11-17 RX ORDER — ATORVASTATIN CALCIUM 20 MG/1
60 TABLET, FILM COATED ORAL DAILY
Qty: 270 TABLET | Refills: 3 | Status: SHIPPED | OUTPATIENT
Start: 2022-11-17

## 2022-11-17 RX ORDER — ATORVASTATIN CALCIUM 40 MG/1
60 TABLET, FILM COATED ORAL DAILY
Qty: 90 TABLET | Refills: 3 | Status: SHIPPED | OUTPATIENT
Start: 2022-11-17 | End: 2022-11-17

## 2022-11-17 NOTE — PROGRESS NOTES
Date of Encounter: 2022  Patient: Bernard Resendez,  1940    SUBSEQUENT MEDICARE WELLNESS VISIT  Subjective   History of Presenting Illness  Chief complaint: Medicare wellness exam    No acute concerns.     Hypertension controlled on current regimen with no side effects.    Hyperlipidemia with recent elevation in LDL.  His diet has been poor but otherwise no recent change to his dosing.  He is not having any side effects with this medication.    CKD 3A, does not use NSAIDs regularly.  Was fasting and slightly dehydrated during his last blood work.    UC controlled with no symptoms.  Gastroenterology has opted not to bring him back for any further colonoscopies at this time.    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  Tolerating clopidogrel well with no side effects.  Neurology opted to avoid DAPT and just keep him on clopidogrel.    Meningioma noted on MRI, some mild mass-effect, being followed annually.    Insomnia, continues to be well controlled with zolpidem.  Has tried melatonin, doxepin without side effects.  Good sleep hygiene.    No recent episodes of gout.      Lives with wife.  3 children.  Prior less than 10-pack-year smoker quit at age 48.  2 alcoholic drinks per week.  Does not exercise.  Lactose intolerant, otherwise does not follow any diet.  Last colonoscopy . Retired restaurant//owner.  Former .   Getting influenza and COVID-19 bivalent vaccine on a different day along with his wife Has a living will not on file, confirms that he is DNR.     Pain  In the past 7 days, how much pain have you felt? None     Review of Systems:  Negative for fever, congestion, chest pain upon exertion, shortness of breath, vision changes, vomiting, dysuria, lymphadenopathy, muscle weakness, numbness, mood changes, rashes.    Health Risk Assessment:    Recent Hospitalizations:  Recently treated at the following:  [facility]    Current Medical  Providers:  Patient Care Team:  Ector Sykes MD as PCP - General (Family Medicine)    Smoking Status:  Social History     Tobacco Use   Smoking Status Former   • Packs/day: 0.25   • Years: 20.00   • Pack years: 5.00   • Types: Cigarettes   • Quit date:    • Years since quittin.9   Smokeless Tobacco Former       Alcohol Consumption:  Social History     Substance and Sexual Activity   Alcohol Use Yes   • Alcohol/week: 2.0 standard drinks   • Types: 2 Shots of liquor per week    Comment: socially       Depression Screen:   PHQ-2/PHQ-9 Depression Screening 2022   Retired PHQ-9 Total Score -   Retired Total Score -   Little Interest or Pleasure in Doing Things 0-->not at all   Feeling Down, Depressed or Hopeless 0-->not at all   PHQ-9: Brief Depression Severity Measure Score 0     I spent more than 16 minutes asking patient questions, counseling and documenting in the chart    Fall Risk Screen:  DAVID Fall Risk Assessment was completed, and patient is at LOW risk for falls.Assessment completed on:2022    Health Habits and Functional and Cognitive Screening:  Functional & Cognitive Status 2022   Do you have difficulty preparing food and eating? No   Do you have difficulty bathing yourself, getting dressed or grooming yourself? No   Do you have difficulty using the toilet? No   Do you have difficulty moving around from place to place? No   Do you have trouble with steps or getting out of a bed or a chair? No   Current Diet -   Dental Exam -   Eye Exam -   Exercise (times per week) -   Current Exercise Activities Include -   Do you need help using the phone?  No   Are you deaf or do you have serious difficulty hearing?  No   Do you need help with transportation? No   Do you need help shopping? No   Do you need help preparing meals?  No   Do you need help with housework?  No   Do you need help with laundry? No   Do you need help taking your medications? No   Do you need help managing money? No    Do you ever drive or ride in a car without wearing a seat belt? No   Have you felt unusual stress, anger or loneliness in the last month? -   Who do you live with? -   If you need help, do you have trouble finding someone available to you? -   Have you been bothered in the last four weeks by sexual problems? -   Do you have difficulty concentrating, remembering or making decisions? No       Does the patient have evidence of cognitive impairment? No    Aspirin use counseling:On clopidrogel as an alternative (due to ASA contraindication)    Recent Lab Results:  Lab Results   Component Value Date    CHLPL 213 (H) 11/10/2022    TRIG 113 11/10/2022    HDL 48 11/10/2022     (H) 11/10/2022    VLDL 20 11/10/2022    HGBA1C 5.0 11/10/2022        Age-appropriate Screening Schedule:  Refer to the list below for future screening recommendations based on patient's age, sex and/or medical conditions. Orders for these recommended tests are listed in the plan section. The patient has been provided with a written plan.    Health Maintenance   Topic Date Due   • TDAP/TD VACCINES (1 - Tdap) Never done   • INFLUENZA VACCINE  08/01/2022   • LIPID PANEL  11/10/2023   • ZOSTER VACCINE  Completed   • DXA SCAN  Discontinued       Compared to one year ago, the patient feels his physical health is the same.  Compared to one year ago, the patient feels his mental health is the same.    Reviewed chart for potential of high risk medication in the elderly: yes  Reviewed chart for potential of harmful drug interactions in the elderly:yes    Advanced Care Planning:  Advance Care Planning   ACP discussion was held with the patient during this visit. Patient has an advance directive (not in EMR), copy requested.    The following portions of the patient's history were reviewed and updated as appropriate: allergies, current medications, past family history, past medical history, past social history, past surgical history and problem  list.    Patient Active Problem List   Diagnosis   • Stage 3a chronic kidney disease (HCC)   • Hyperlipidemia   • Hypogonadism in male   • Insomnia d/t anxiety   • Osteopenia   • Hypertension   • Colon polyps   • Ulcerative colitis without complications (HCC)   • Obesity, Class I, BMI 30-34.9   • DNR (do not resuscitate)   • History of gout   • Meningioma (HCC)   • Cerebrovascular accident (CVA) of pontine structure (HCC)   • Mild cognitive impairment     Past Medical History:   Diagnosis Date   • Acute CVA (cerebrovascular accident) (HCC) 1/27/2022   • Acute renal failure (HCC)    • Cancer (HCC)     SKIN LESION REMOVED   • Diverticulosis    • Gout 2/13/2016   • Hearing difficulty    • History of shingles    • Hyperlipidemia    • Hypertension    • Hypogonadism in male    • Insomnia    • Nephrolithiasis    • Osteopenia    • Osteopenia    • Pruritus 1/15/2021   • Renal insufficiency    • Ulcerative colitis (HCC)    • Venous stasis dermatitis of both lower extremities 1/15/2021     Past Surgical History:   Procedure Laterality Date   • CIRCUMCISION N/A 6/16/2021    Procedure: CIRCUMCISION;  Surgeon: Milton Forrest MD;  Location: [facility] MAIN OR;  Service: Urology;  Laterality: N/A;   • COLONOSCOPY  06/11/2015    NBIH, actuve colitis w/cryptitis, rare crypt abscess & superficial erosion, hyperplastic polyp   • COLONOSCOPY N/A 8/8/2017    IH, nodular mucosa in the proximal rectum and in the distal rectum, Diverticulosis in the sigmoid colon, the descending colon and at the splenic flexure.  PATH: Hyperplastic polyps    • COLONOSCOPY N/A 11/19/2019    Procedure: COLONOSCOPY to cecum with biopsies;  Surgeon: Calvin Gifford MD;  Location: [facility] ENDOSCOPY;  Service: Gastroenterology   • EYE SURGERY      cataract   • KIDNEY STONE SURGERY     • TUMOR EXCISION      rectal tumor     Family History   Problem Relation Age of Onset   • Hypertension Mother    • Stroke Mother    • Heart disease Mother    • Asthma Sister    •  Ulcerative colitis Sister    • Emphysema Sister    • Lung disease Father         mesothelioma   • Glaucoma Father    • Diabetes Son    • Malig Hyperthermia Neg Hx        Current Outpatient Medications:   •  amLODIPine (NORVASC) 10 MG tablet, Take 1 tablet by mouth Daily., Disp: 90 tablet, Rfl: 3  •  atenolol (TENORMIN) 50 MG tablet, TAKE ONE TABLET BY MOUTH DAILY, Disp: 90 tablet, Rfl: 0  •  Cholecalciferol (VITAMIN D3) 1000 UNITS capsule, Take 1,000 Units by mouth Daily., Disp: , Rfl:   •  clopidogrel (Plavix) 75 MG tablet, Take 1 tablet by mouth Daily., Disp: 90 tablet, Rfl: 3  •  Cyanocobalamin (Vitamin B12) 1000 MCG tablet controlled-release, Take 1,000 mcg by mouth Daily., Disp: , Rfl:   •  hydroCHLOROthiazide (HYDRODIURIL) 12.5 MG tablet, TAKE ONE TABLET BY MOUTH DAILY FOR BLOOD PRESSURE, Disp: 90 tablet, Rfl: 1  •  lisinopril (PRINIVIL,ZESTRIL) 40 MG tablet, TAKE ONE TABLET BY MOUTH DAILY, Disp: 90 tablet, Rfl: 3  •  Multiple Vitamins-Minerals (CENTRUM SILVER ADULT 50+ PO), Take 1 tablet by mouth Daily. HOLD PER MD INSTR, Disp: , Rfl:   •  Omega-3 Fatty Acids (FISH OIL) 1200 MG capsule capsule, Take 1,200 mg by mouth Daily With Breakfast. HOLD PER MD INSTR, Disp: , Rfl:   •  sulfaSALAzine (AZULFIDINE) 500 MG tablet, Take 2 tablets by mouth 3 (Three) Times a Day., Disp: 180 tablet, Rfl: 11  •  vitamin C (ASCORBIC ACID) 500 MG tablet, Take 1,000 mg by mouth Daily. HOLD PER MD INSTR, Disp: , Rfl:   •  Wheat Dextrin (BENEFIBER PO), Take  by mouth., Disp: , Rfl:   •  zolpidem CR (AMBIEN CR) 6.25 MG CR tablet, TAKE ONE TABLET BY MOUTH ONCE NIGHTLY AS NEEDED FOR SLEEP, Disp: 90 tablet, Rfl: 2  •  atorvastatin (Lipitor) 20 MG tablet, Take 3 tablets by mouth Daily., Disp: 270 tablet, Rfl: 3  Allergies   Allergen Reactions   • Ciprofloxacin Rash     Pt unsure reaction     Social History     Tobacco Use   • Smoking status: Former     Packs/day: 0.25     Years: 20.00     Pack years: 5.00     Types: Cigarettes     Quit  "date:      Years since quittin.9   • Smokeless tobacco: Former   Vaping Use   • Vaping Use: Never used   Substance Use Topics   • Alcohol use: Yes     Alcohol/week: 2.0 standard drinks     Types: 2 Shots of liquor per week     Comment: socially   • Drug use: No          Objective   Physical Exam  Vitals:    22 1332   BP: 112/58   BP Location: Left arm   Patient Position: Sitting   Cuff Size: Large Adult   Pulse: 64   Temp: 97.1 °F (36.2 °C)   TempSrc: Infrared   SpO2: 97%   Weight: 115 kg (254 lb 3.2 oz)   Height: 180.3 cm (71\")     Body mass index is 35.45 kg/m².  Discussed the patient's BMI with him. The BMI Is above average, unlikely to lose weight.  I did discuss dietary management..    Constitutional: NAD.  Hard of hearing.  Eyes: EOMI. PERRLA. Normal conjunctiva.  Ear, nose, mouth, throat: No tonsillar exudates or erythema.   Normal nasal mucosa. Normal external ear canals and TMs bilaterally.  Cardiovascular: RRR. No murmurs.  Trace nonpitting edema of both lower legs without venous stasis dermatitis.  Radial pulses 2+ bilaterally.  Pulmonary: CTA b/l. Good effort.  Integumentary: No lacerations or wounds on face or upper extremities.  Lymphatic: No anterior cervical lymphadenopathy.  Endocrine: No thyromegaly or palpable thyroid nodules.  Psychiatric: Normal affect. Normal thought content.     Assessment & Plan   Assessment and Plan  Pleasant 82-year-old male with hypertension, hyperlipidemia, ulcerative colitis, CKD 3A, history of CVA, meningioma, osteopenia, history of gout, insomnia, among other problems, who presents with the following:    Advance Directive Discussion  Colon Cancer Screening  Hearing Problem  Immunizations Discussed/Encouraged (specific immunizations; Td, Influenza and COVID19 )  Inactivity/Sedentary  Obesity/Overweight     The above risks/problems have been discussed with the patient.  Pertinent information has been shared with the patient in the After Visit " Summary.  Other plans as below:    Diagnoses and all orders for this visit:    1. Encounter for subsequent annual wellness visit (AWV) in Medicare patient (Primary): We discussed preventative care including age and patient-appropriate immunizations and cancer screening. We also discussed the importance of exercise and a healthy diet. This is their annual preventative exam.    2. Primary hypertension: Controlled    3. Hyperlipidemia, unspecified hyperlipidemia type: Increase atorvastatin to 60 mg based upon LDL not at goal  -     atorvastatin (Lipitor) 20 MG tablet; Take 3 tablets by mouth Daily.  Dispense: 270 tablet; Refill: 3    4. Stage 3a chronic kidney disease (HCC): Encourage continued hydration, NSAID avoidance.  We will follow-up in 6 months    5. Ulcerative colitis without complications, unspecified location (HCC): Controlled.  I agree with no further colonoscopy at this time due to diminishing 10-year benefit.    6. Cerebrovascular accident (CVA) of pontine structure (HCC): Continue clopidogrel and routine neurology follow-up.  Increasing statin as above.  Tolerating well.    7. Meningioma (HCC): Follow-up in 1 year appears to have been ordered.  There was some mass-effect on initial imaging but he is subjectively asymptomatic.    8. Insomnia, unspecified type: Controlled on zolpidem    9. Osteopenia, unspecified location: In context of his age and other problems I would not treat him with bisphosphonates or Prolia even if a repeat DEXA was worse.  Encouraged regular weightbearing exercise    10. History of gout: No recent episode    11. Obesity, Class I, BMI 30-34.9: Encouraged regular walking for exercise    12. DNR (do not resuscitate): Has a living well, request a copy, he is DNR in our chart    Follow Up:  6 months for CKD among other problems    An After Visit Summary and PPPS were given to the patient.      Ector Sykes MD  Family Medicine  O: [phone]    Disclaimer: Parts of this note were  dictated by speech recognition. Minor errors in transcription may be present. Please call if questions.

## 2022-11-29 ENCOUNTER — TELEPHONE (OUTPATIENT)
Dept: INTERNAL MEDICINE | Facility: CLINIC | Age: 82
End: 2022-11-29

## 2022-11-29 NOTE — TELEPHONE ENCOUNTER
Caller: Bernard Resendez    Relationship to patient: Self    Best call back number: [phone]    Patient is needing: PATIENT IS REQUESTING A CALLBACK TO DISCUSS RESULTS OF COVID-19 AND STREP TEST COMPLETED TODAY 11/29/22. PLEASE ADVISE.

## 2022-12-01 ENCOUNTER — TELEPHONE (OUTPATIENT)
Dept: INTERNAL MEDICINE | Facility: CLINIC | Age: 82
End: 2022-12-01

## 2022-12-01 ENCOUNTER — APPOINTMENT (OUTPATIENT)
Dept: GENERAL RADIOLOGY | Facility: HOSPITAL | Age: 82
End: 2022-12-01

## 2022-12-01 PROCEDURE — 71046 X-RAY EXAM CHEST 2 VIEWS: CPT | Performed by: FAMILY MEDICINE

## 2022-12-01 PROCEDURE — 71046 X-RAY EXAM CHEST 2 VIEWS: CPT

## 2022-12-01 NOTE — TELEPHONE ENCOUNTER
Pt calling today stating that he still has an extremely sore throat, runny nose, cough with phlegm, and is not sleeping despite negative POC testing Tuesday 11/29. Pt very irritated that there are still no appts available. Pt was directed to UC as nothing is available for provider. Pt refuses and demands that he wants an antibiotic prescribed, pt was advised that there are no openings and Dr. Sykes will not send antibiotic without examination.     Pt was offered another drive up POC test, pt declined and asked if we can make exception to get him in, I advised him there is no possible way to get him scheduled and directed him to his closest , [place]. Pt stated that he will think about it. Per pt request he wants the office/provider to know he is disappointed, and does not understand why exception can not be made for his care.

## 2022-12-05 ENCOUNTER — TELEPHONE (OUTPATIENT)
Dept: INTERNAL MEDICINE | Facility: CLINIC | Age: 82
End: 2022-12-05

## 2022-12-05 NOTE — TELEPHONE ENCOUNTER
Caller: Bernard Resendez    Relationship to patient: Self    Best call back number: [phone]    Chief complaint: CHEST AND LUNG CONGESTION, SORE THROAT - PATIENT IS CURRENTLY ON MEDICATION PRESCRIBED VIA URGENT CARE. HOWEVER, THE PATIENT STATES THAT THIS MEDICATION IS ONLY HELPING A LITTLE.    Type of visit: SAME DAY OR OFFICE VISIT    Requested date: ASAP    If rescheduling, when is the original appointment: *N/A - NOTHING FOUND

## 2022-12-09 ENCOUNTER — OFFICE VISIT (OUTPATIENT)
Dept: INTERNAL MEDICINE | Facility: CLINIC | Age: 82
End: 2022-12-09

## 2022-12-09 VITALS
WEIGHT: 242 LBS | HEART RATE: 64 BPM | TEMPERATURE: 97.8 F | SYSTOLIC BLOOD PRESSURE: 124 MMHG | DIASTOLIC BLOOD PRESSURE: 70 MMHG | OXYGEN SATURATION: 94 % | HEIGHT: 71 IN | BODY MASS INDEX: 33.88 KG/M2

## 2022-12-09 DIAGNOSIS — J40 BRONCHITIS: Primary | ICD-10-CM

## 2022-12-09 DIAGNOSIS — R06.2 WHEEZING: ICD-10-CM

## 2022-12-09 PROCEDURE — 96372 THER/PROPH/DIAG INJ SC/IM: CPT | Performed by: FAMILY MEDICINE

## 2022-12-09 PROCEDURE — 99214 OFFICE O/P EST MOD 30 MIN: CPT | Performed by: FAMILY MEDICINE

## 2022-12-09 RX ORDER — ALBUTEROL SULFATE 90 UG/1
2 AEROSOL, METERED RESPIRATORY (INHALATION) EVERY 4 HOURS PRN
Qty: 6.7 G | Refills: 3 | Status: SHIPPED | OUTPATIENT
Start: 2022-12-09

## 2022-12-09 RX ORDER — METHYLPREDNISOLONE SODIUM SUCCINATE 125 MG/2ML
60 INJECTION, POWDER, LYOPHILIZED, FOR SOLUTION INTRAMUSCULAR; INTRAVENOUS ONCE
Status: COMPLETED | OUTPATIENT
Start: 2022-12-09 | End: 2022-12-09

## 2022-12-09 RX ORDER — AZITHROMYCIN 250 MG/1
TABLET, FILM COATED ORAL
Qty: 6 TABLET | Refills: 0 | Status: SHIPPED | OUTPATIENT
Start: 2022-12-09

## 2022-12-09 RX ORDER — PREDNISONE 10 MG/1
TABLET ORAL
Qty: 28 TABLET | Refills: 0 | Status: SHIPPED | OUTPATIENT
Start: 2022-12-09 | End: 2022-12-21

## 2022-12-09 RX ADMIN — METHYLPREDNISOLONE SODIUM SUCCINATE 60 MG: 125 INJECTION, POWDER, LYOPHILIZED, FOR SOLUTION INTRAMUSCULAR; INTRAVENOUS at 12:03

## 2022-12-09 NOTE — TELEPHONE ENCOUNTER
Caller: Bernard Resendez    Relationship to patient: Self    Best call back number: [phone]    Patient is needing:SHOULD HE FINISH THE OLD ANTIBIOTIC BEFORE TAKING THE NEW PRESCRIPTION?  HE HAS A DAY AND A HALF LEFT OF THE OLD ONE.

## 2022-12-09 NOTE — PROGRESS NOTES
Date of Encounter: 2022  Patient: Bernard Resendez,  1940    Subjective   History of Presenting Illness  Chief complaint: Cough    Patient presented to the urgent care on 2022 for wheezy cough.  He was diagnosed with bronchitis and treated with Augmentin, prednisone, benzonatate.  He is on day 9 of his Augmentin and has completed his prednisone but he is still having a worsening cough with wheezing and shortness of breath.  He denies fever and chills.  He is making yellow-green sputum.  He denies nausea, vomiting, abdominal pain, dizziness, chest pain.  Point-of-care influenza and COVID-19 test are negative.  No sick family members at home.  He has no personal history of chronic lung disease. he has tolerated the steroids well with no side effects    Review of Systems:  Per HPI    The following portions of the patient's history were reviewed and updated as appropriate: allergies, current medications, past family history, past medical history, past social history, past surgical history and problem list.    Patient Active Problem List   Diagnosis   • Stage 3a chronic kidney disease (HCC)   • Hyperlipidemia   • Hypogonadism in male   • Insomnia d/t anxiety   • Osteopenia   • Hypertension   • Colon polyps   • Ulcerative colitis without complications (HCC)   • Obesity, Class I, BMI 30-34.9   • DNR (do not resuscitate)   • History of gout   • Meningioma (HCC)   • Cerebrovascular accident (CVA) of pontine structure (HCC)   • Mild cognitive impairment     Past Medical History:   Diagnosis Date   • Acute CVA (cerebrovascular accident) (HCC) 2022   • Acute renal failure (HCC)    • Cancer (HCC)     SKIN LESION REMOVED   • Diverticulosis    • Gout 2016   • Hearing difficulty    • History of shingles    • Hyperlipidemia    • Hypertension    • Hypogonadism in male    • Insomnia    • Nephrolithiasis    • Osteopenia    • Osteopenia    • Pruritus 1/15/2021   • Renal insufficiency    • Ulcerative colitis  (HCC)    • Venous stasis dermatitis of both lower extremities 1/15/2021     Past Surgical History:   Procedure Laterality Date   • CIRCUMCISION N/A 6/16/2021    Procedure: CIRCUMCISION;  Surgeon: Milton Forrest MD;  Location: [facility] MAIN OR;  Service: Urology;  Laterality: N/A;   • COLONOSCOPY  06/11/2015    NBIH, actuve colitis w/cryptitis, rare crypt abscess & superficial erosion, hyperplastic polyp   • COLONOSCOPY N/A 8/8/2017    IH, nodular mucosa in the proximal rectum and in the distal rectum, Diverticulosis in the sigmoid colon, the descending colon and at the splenic flexure.  PATH: Hyperplastic polyps    • COLONOSCOPY N/A 11/19/2019    Procedure: COLONOSCOPY to cecum with biopsies;  Surgeon: Calvin Gifford MD;  Location: [facility] ENDOSCOPY;  Service: Gastroenterology   • EYE SURGERY      cataract   • KIDNEY STONE SURGERY     • TUMOR EXCISION      rectal tumor     Family History   Problem Relation Age of Onset   • Hypertension Mother    • Stroke Mother    • Heart disease Mother    • Asthma Sister    • Ulcerative colitis Sister    • Emphysema Sister    • Lung disease Father         mesothelioma   • Glaucoma Father    • Diabetes Son    • Malig Hyperthermia Neg Hx        Current Outpatient Medications:   •  amLODIPine (NORVASC) 10 MG tablet, Take 1 tablet by mouth Daily., Disp: 90 tablet, Rfl: 3  •  amoxicillin-clavulanate (AUGMENTIN) 875-125 MG per tablet, Take 1 tablet by mouth 2 (Two) Times a Day for 10 days., Disp: 20 tablet, Rfl: 0  •  atenolol (TENORMIN) 50 MG tablet, TAKE ONE TABLET BY MOUTH DAILY, Disp: 90 tablet, Rfl: 0  •  atorvastatin (Lipitor) 20 MG tablet, Take 3 tablets by mouth Daily., Disp: 270 tablet, Rfl: 3  •  benzonatate (TESSALON) 200 MG capsule, Take 1 capsule by mouth 3 (Three) Times a Day As Needed for Cough for up to 10 days., Disp: 30 capsule, Rfl: 0  •  Cholecalciferol (VITAMIN D3) 1000 UNITS capsule, Take 1,000 Units by mouth Daily., Disp: , Rfl:   •  clopidogrel (Plavix) 75 MG  tablet, Take 1 tablet by mouth Daily., Disp: 90 tablet, Rfl: 3  •  Cyanocobalamin (Vitamin B12) 1000 MCG tablet controlled-release, Take 1,000 mcg by mouth Daily., Disp: , Rfl:   •  hydroCHLOROthiazide (HYDRODIURIL) 12.5 MG tablet, TAKE ONE TABLET BY MOUTH DAILY FOR BLOOD PRESSURE, Disp: 90 tablet, Rfl: 1  •  lisinopril (PRINIVIL,ZESTRIL) 40 MG tablet, TAKE ONE TABLET BY MOUTH DAILY, Disp: 90 tablet, Rfl: 3  •  Multiple Vitamins-Minerals (CENTRUM SILVER ADULT 50+ PO), Take 1 tablet by mouth Daily. HOLD PER MD INSTR, Disp: , Rfl:   •  Omega-3 Fatty Acids (FISH OIL) 1200 MG capsule capsule, Take 1,200 mg by mouth Daily With Breakfast. HOLD PER MD INSTR, Disp: , Rfl:   •  sulfaSALAzine (AZULFIDINE) 500 MG tablet, Take 2 tablets by mouth 3 (Three) Times a Day., Disp: 180 tablet, Rfl: 11  •  vitamin C (ASCORBIC ACID) 500 MG tablet, Take 1,000 mg by mouth Daily. HOLD PER MD INSTR, Disp: , Rfl:   •  Wheat Dextrin (BENEFIBER PO), Take  by mouth., Disp: , Rfl:   •  zolpidem CR (AMBIEN CR) 6.25 MG CR tablet, TAKE ONE TABLET BY MOUTH ONCE NIGHTLY AS NEEDED FOR SLEEP, Disp: 90 tablet, Rfl: 2  •  albuterol sulfate  (90 Base) MCG/ACT inhaler, Inhale 2 puffs Every 4 (Four) Hours As Needed for Wheezing., Disp: 6.7 g, Rfl: 3  •  azithromycin (Zithromax) 250 MG tablet, Take 2 tablets the first day, then 1 tablet daily for 4 days., Disp: 6 tablet, Rfl: 0  •  predniSONE (DELTASONE) 10 MG tablet, Take 4 tablets by mouth Daily for 4 days, THEN 2 tablets Daily for 4 days, THEN 1 tablet Daily for 4 days. START ON 12/10, Disp: 28 tablet, Rfl: 0    Current Facility-Administered Medications:   •  albuterol (PROVENTIL) nebulizer solution 0.5% 2.5 mg/0.5mL, 2.5 mg, Nebulization, Once, Ector Sykes MD  •  methylPREDNISolone sodium succinate (SOLU-Medrol) injection 60 mg, 60 mg, Intramuscular, Once, Ector Sykes MD  Allergies   Allergen Reactions   • Ciprofloxacin Rash     Pt unsure reaction     Social History     Tobacco Use   • Smoking  "status: Former     Packs/day: 0.25     Years: 20.00     Pack years: 5.00     Types: Cigarettes     Quit date:      Years since quittin.9   • Smokeless tobacco: Former   Vaping Use   • Vaping Use: Never used   Substance Use Topics   • Alcohol use: Yes     Alcohol/week: 2.0 standard drinks     Types: 2 Shots of liquor per week     Comment: socially   • Drug use: No          Objective   Physical Exam  Vitals:    22 1018   BP: 124/70   BP Location: Left arm   Patient Position: Sitting   Pulse: 64   Temp: 97.8 °F (36.6 °C)   SpO2: 94%   Weight: 110 kg (242 lb)   Height: 180.3 cm (71\")     Body mass index is 33.75 kg/m².    Constitutional: NAD.  Eyes: EOMI. PERRLA. Normal conjunctiva.  Ear, nose, mouth, throat: Postnasal drip with posterior erythema.  Erythematous nasal mucosa with thick yellow discharge. Normal external ear canals and TMs bilaterally.  Cardiovascular: RRR. No murmurs. No LE edema b/l. Radial pulses 2+ bilaterally.  Pulmonary: Diffuse coarseness, inspiratory and expiratory wheezing, and reduced aeration of both bases.  Good effort.  Integumentary: No rashes or wounds on face or upper extremities.  Lymphatic: No anterior cervical lymphadenopathy.  Endocrine: No thyromegaly or palpable thyroid nodules.  Psychiatric: Normal affect. Normal thought content.       Assessment & Plan   Assessment and Plan  Pleasant 82-year-old male with hypertension, hyperlipidemia, ulcerative colitis, CKD 3A, history of CVA, meningioma, osteopenia, history of gout, insomnia, among other problems, who presents with the following:     Diagnoses and all orders for this visit:    1. Bronchitis (Primary)  -     methylPREDNISolone sodium succinate (SOLU-Medrol) injection 60 mg  -     albuterol (PROVENTIL) nebulizer solution 0.5% 2.5 mg/0.5mL  -     azithromycin (Zithromax) 250 MG tablet; Take 2 tablets the first day, then 1 tablet daily for 4 days.  Dispense: 6 tablet; Refill: 0  -     predniSONE (DELTASONE) 10 MG " tablet; Take 4 tablets by mouth Daily for 4 days, THEN 2 tablets Daily for 4 days, THEN 1 tablet Daily for 4 days. START ON 12/10  Dispense: 28 tablet; Refill: 0  -     albuterol sulfate  (90 Base) MCG/ACT inhaler; Inhale 2 puffs Every 4 (Four) Hours As Needed for Wheezing.  Dispense: 6.7 g; Refill: 3    2. Wheezing  -     methylPREDNISolone sodium succinate (SOLU-Medrol) injection 60 mg  -     albuterol (PROVENTIL) nebulizer solution 0.5% 2.5 mg/0.5mL  -     azithromycin (Zithromax) 250 MG tablet; Take 2 tablets the first day, then 1 tablet daily for 4 days.  Dispense: 6 tablet; Refill: 0  -     predniSONE (DELTASONE) 10 MG tablet; Take 4 tablets by mouth Daily for 4 days, THEN 2 tablets Daily for 4 days, THEN 1 tablet Daily for 4 days. START ON 12/10  Dispense: 28 tablet; Refill: 0  -     albuterol sulfate  (90 Base) MCG/ACT inhaler; Inhale 2 puffs Every 4 (Four) Hours As Needed for Wheezing.  Dispense: 6.7 g; Refill: 3    He is very tight today and clearly worsening compared to his recent urgent care visit.  Fortunately no fever or auscultated bone pneumonia.  I would like to treat this aggressively due to his pulse oximetry of 94% and lung exam with worsening symptoms.  We will give him methylprednisolone today, give him a nebulizer treatment, start another prednisone taper tomorrow, add on azithromycin for atypical coverage, and give him an albuterol inhaler to use at home.  He is at risk of worsening.  I discussed reasons to go to the hospital.    Ector Sykes MD  Family Medicine  O: [phone]    Disclaimer: Parts of this note were dictated by speech recognition. Minor errors in transcription may be present. Please call if questions.

## 2022-12-12 NOTE — TELEPHONE ENCOUNTER
Spoke to pt about about providers message, pt understanding and stated that he had already taken the medication. Pt had no questions.

## 2022-12-29 DIAGNOSIS — G47.00 INSOMNIA, UNSPECIFIED TYPE: ICD-10-CM

## 2022-12-30 RX ORDER — ZOLPIDEM TARTRATE 6.25 MG/1
TABLET, FILM COATED, EXTENDED RELEASE ORAL
Qty: 90 TABLET | Refills: 0 | Status: SHIPPED | OUTPATIENT
Start: 2022-12-30 | End: 2023-03-13 | Stop reason: SDUPTHER

## 2022-12-30 NOTE — TELEPHONE ENCOUNTER
Rx Refill Note  Requested Prescriptions     Pending Prescriptions Disp Refills   • zolpidem CR (AMBIEN CR) 6.25 MG CR tablet [Pharmacy Med Name: ZOLPIDEM TART ER 6.25 MG TAB] 90 tablet      Sig: TAKE ONE TABLET BY MOUTH EVERY NIGHT AS NEEDED FOR SLEEP      Last office visit with prescribing clinician: 12/9/2022   Last telemedicine visit with prescribing clinician: 5/18/2023   Next office visit with prescribing clinician: 5/18/2023          ToxASSURE Select 13 (MW) - Urine, Clean Catch (03/30/2021 13:32)                   Would you like a call back once the refill request has been completed: [] Yes [] No    If the office needs to give you a call back, can they leave a voicemail: [] Yes [] No    Elise Ayala MA  12/30/22, 08:09 EST

## 2023-02-14 DIAGNOSIS — I10 ESSENTIAL HYPERTENSION: ICD-10-CM

## 2023-02-14 RX ORDER — ATENOLOL 50 MG/1
TABLET ORAL
Qty: 90 TABLET | Refills: 0 | Status: SHIPPED | OUTPATIENT
Start: 2023-02-14

## 2023-03-13 DIAGNOSIS — G47.00 INSOMNIA, UNSPECIFIED TYPE: ICD-10-CM

## 2023-03-13 RX ORDER — ZOLPIDEM TARTRATE 6.25 MG/1
6.25 TABLET, FILM COATED, EXTENDED RELEASE ORAL NIGHTLY PRN
Qty: 90 TABLET | Refills: 0 | Status: SHIPPED | OUTPATIENT
Start: 2023-03-30 | End: 2023-03-21

## 2023-03-13 NOTE — TELEPHONE ENCOUNTER
Requested Prescriptions     zolpidem CR (AMBIEN CR) 6.25 MG CR tablet         Sig: Take 1 tablet by mouth At Night As Needed for Sleep.    Disp:  90 tablet    Refills:  0    Start: 3/13/2023    Class: Normal    For: Insomnia, unspecified type    Last ordered: 2 months ago by Ector Sykes MD     Rx Controlled Substance Protocol Failed    Urine Toxicology Performed in Last 12 Months    Recent Appt with me in Past 3 Months    Medication not refilled in past 28 days    No Benzodiazepines on Active Med List      To be filled at: [facility]

## 2023-03-13 NOTE — TELEPHONE ENCOUNTER
Caller: MarinohenryBernard    Relationship: Self    Best call back number:    [phone]          Requested Prescriptions:   Requested Prescriptions     Pending Prescriptions Disp Refills   • zolpidem CR (AMBIEN CR) 6.25 MG CR tablet 90 tablet 0     Sig: Take 1 tablet by mouth At Night As Needed for Sleep.        Pharmacy where request should be sent: [facility]     Additional details provided by patient:     Does the patient have less than a 3 day supply:  [x] Yes  [] No    Would you like a call back once the refill request has been completed: [] Yes [] No    If the office needs to give you a call back, can they leave a voicemail: [] Yes [] No    Tio Villa Rep   03/13/23 09:34 EDT

## 2023-03-21 DIAGNOSIS — G47.00 INSOMNIA, UNSPECIFIED TYPE: ICD-10-CM

## 2023-03-21 DIAGNOSIS — I10 PRIMARY HYPERTENSION: ICD-10-CM

## 2023-03-21 RX ORDER — HYDROCHLOROTHIAZIDE 12.5 MG/1
TABLET ORAL
Qty: 90 TABLET | Refills: 3 | Status: SHIPPED | OUTPATIENT
Start: 2023-03-21

## 2023-03-21 RX ORDER — ZOLPIDEM TARTRATE 6.25 MG/1
TABLET, FILM COATED, EXTENDED RELEASE ORAL
Qty: 90 TABLET | Refills: 1 | Status: SHIPPED | OUTPATIENT
Start: 2023-03-21

## 2023-03-21 NOTE — TELEPHONE ENCOUNTER
Rx Refill Note  Requested Prescriptions     Pending Prescriptions Disp Refills   • hydroCHLOROthiazide (HYDRODIURIL) 12.5 MG tablet [Pharmacy Med Name: hydroCHLOROthiazide 12.5 MG TABLET] 90 tablet 1     Sig: TAKE ONE TABLET BY MOUTH DAILY FOR BLOOD PRESSURE      Last office visit with prescribing clinician: 12/9/2022   Last telemedicine visit with prescribing clinician: 5/18/2023   Next office visit with prescribing clinician: 5/18/2023                         Would you like a call back once the refill request has been completed: [] Yes [] No    If the office needs to give you a call back, can they leave a voicemail: [] Yes [] No    lEise Ayala MA  03/21/23, 10:50 EDT

## 2023-03-21 NOTE — TELEPHONE ENCOUNTER
Rx Refill Note  Requested Prescriptions     Pending Prescriptions Disp Refills   • zolpidem CR (AMBIEN CR) 6.25 MG CR tablet [Pharmacy Med Name: ZOLPIDEM TART ER 6.25 MG TAB] 90 tablet      Sig: TAKE ONE TABLET BY MOUTH ONCE NIGHTLY AS NEEDED FOR SLEEP      Last office visit with prescribing clinician: 12/9/2022   Last telemedicine visit with prescribing clinician: 5/18/2023   Next office visit with prescribing clinician: 5/18/2023        ToxASSURE Select 13 (MW) - Urine, Clean Catch (03/30/2021 13:32)                   Would you like a call back once the refill request has been completed: [] Yes [] No    If the office needs to give you a call back, can they leave a voicemail: [] Yes [] No    Charissa Medrano LPN  03/21/23, 11:34 EDT

## 2023-04-04 DIAGNOSIS — I10 PRIMARY HYPERTENSION: ICD-10-CM

## 2023-04-04 RX ORDER — AMLODIPINE BESYLATE 10 MG/1
TABLET ORAL
Qty: 90 TABLET | Refills: 3 | Status: SHIPPED | OUTPATIENT
Start: 2023-04-04

## 2023-04-05 ENCOUNTER — TELEPHONE (OUTPATIENT)
Dept: NEUROLOGY | Facility: CLINIC | Age: 83
End: 2023-04-05

## 2023-04-05 NOTE — TELEPHONE ENCOUNTER
Caller: USHA     Relationship: INSURANCE     Best call back number:[phone]    What was the call regarding: CALLING TO GET MORE INFO TO DO A P.A ON C.T  USHA SAID ORDER WAS PLACED TODAY @ 1:08 BY DR MORROW? I DON'T SEE IN CHART     Do you require a callback:YES     PLEASE ADVISE

## 2023-04-13 NOTE — TELEPHONE ENCOUNTER
Provider: DR THORNTON   Caller: USHA WITH [employer]  Phone Number: [phone] - PREFER IF WE CALL PATIENT BACK - [phone]      Reason for Call: CALLED TO GET AN EXPLANATION ON WHY THE CATSCAN OF BLOOD VESSELS OF THE HEAD WAS WITHDRAWN. PLEASE REVIEW AND ADVISE, THANK YOU.

## 2023-05-09 DIAGNOSIS — I69.30 CHRONIC ARTERIAL ISCHEMIC STROKE: ICD-10-CM

## 2023-05-09 DIAGNOSIS — I67.2 INTRACRANIAL ATHEROSCLEROSIS: ICD-10-CM

## 2023-05-09 RX ORDER — CLOPIDOGREL BISULFATE 75 MG/1
TABLET ORAL
Qty: 90 TABLET | Refills: 3 | Status: SHIPPED | OUTPATIENT
Start: 2023-05-09

## 2023-05-12 ENCOUNTER — TELEPHONE (OUTPATIENT)
Dept: NEUROLOGY | Facility: CLINIC | Age: 83
End: 2023-05-12

## 2023-05-12 NOTE — TELEPHONE ENCOUNTER
Caller: Bernard Resendez    Relationship: Self    Best call back number: [phone]    What is the best time to reach you: ANY TIME AFTER 11 AM     Who are you requesting to speak with (clinical staff, provider,  specific staff member): CRISTHIAN MILLER OR HIS CLINICAL STAFF    What was the call regarding: PT RECEIVED A LETTER STATING HIS 5/25/23 APPT FOR THE ANGIOGRAM HAS BEEN CANCELLED BUT MYCHART AND THE SYSTEM STILL HAVE IT FOR THAT DAY. THE PT NEEDS TO KNOW IF THIS APPT IS STILL SCHEDULED OR NOT. PT WAS GIVEN THE NUMBER TO [facility] TO CHECK WITH THEM ALSO.     Do you require a callback: YES

## 2023-05-16 DIAGNOSIS — I10 ESSENTIAL HYPERTENSION: ICD-10-CM

## 2023-05-16 RX ORDER — ATENOLOL 50 MG/1
TABLET ORAL
Qty: 90 TABLET | Refills: 0 | Status: SHIPPED | OUTPATIENT
Start: 2023-05-16

## 2023-05-18 ENCOUNTER — OFFICE VISIT (OUTPATIENT)
Dept: INTERNAL MEDICINE | Facility: CLINIC | Age: 83
End: 2023-05-18
Payer: MEDICARE

## 2023-05-18 VITALS
DIASTOLIC BLOOD PRESSURE: 58 MMHG | OXYGEN SATURATION: 95 % | HEART RATE: 62 BPM | TEMPERATURE: 97.5 F | SYSTOLIC BLOOD PRESSURE: 118 MMHG

## 2023-05-18 DIAGNOSIS — J40 BRONCHITIS: Primary | ICD-10-CM

## 2023-05-18 DIAGNOSIS — R30.0 DYSURIA: ICD-10-CM

## 2023-05-18 DIAGNOSIS — R25.2 HAND CRAMPS: ICD-10-CM

## 2023-05-18 PROCEDURE — 99214 OFFICE O/P EST MOD 30 MIN: CPT | Performed by: FAMILY MEDICINE

## 2023-05-18 PROCEDURE — 3078F DIAST BP <80 MM HG: CPT | Performed by: FAMILY MEDICINE

## 2023-05-18 PROCEDURE — 3074F SYST BP LT 130 MM HG: CPT | Performed by: FAMILY MEDICINE

## 2023-05-18 RX ORDER — BACLOFEN 10 MG/1
10 TABLET ORAL 3 TIMES DAILY PRN
Qty: 60 TABLET | Refills: 1 | Status: SHIPPED | OUTPATIENT
Start: 2023-05-18

## 2023-05-18 RX ORDER — AZITHROMYCIN 250 MG/1
TABLET, FILM COATED ORAL
Qty: 6 TABLET | Refills: 0 | Status: SHIPPED | OUTPATIENT
Start: 2023-05-18

## 2023-05-18 NOTE — PROGRESS NOTES
Date of Encounter: 2023  Patient: Bernard Resendez,  1940    Subjective   History of Presenting Illness  Chief complaint: Cough    2 weeks of productive cough with brown sputum with chills.  No measured fever.  He has occasionally felt that he has been wheezing but has not been using an albuterol inhaler.  No chest pain.  Denies nasal congestion, ear pain, abdominal pain, gastrointestinal symptoms, rash, sick contacts.    Patient has also had about 3 weeks of intermittent burning at the end of his urination, increased frequency, and occasional urgency.  Possibly getting better?  No urine cloudiness, discoloration, foul odor or blood, no change in flow.  No discomfort when sitting down. has appointment with urology next week.     Hand cramps that are intermittent, was prescribed baclofen previously which he tolerated well and found effective, asking for refill    The following portions of the patient's history were reviewed and updated as appropriate: allergies, current medications, past family history, past medical history, past social history, past surgical history and problem list.    Patient Active Problem List   Diagnosis   • Stage 3a chronic kidney disease   • Hyperlipidemia   • Hypogonadism in male   • Insomnia d/t anxiety   • Osteopenia   • Hypertension   • Colon polyps   • Ulcerative colitis without complications   • Obesity, Class I, BMI 30-34.9   • DNR (do not resuscitate)   • History of gout   • Meningioma   • Cerebrovascular accident (CVA) of pontine structure   • Mild cognitive impairment   • Hand cramps     Past Medical History:   Diagnosis Date   • Acute CVA (cerebrovascular accident) 2022   • Acute renal failure    • Cancer     SKIN LESION REMOVED   • Diverticulosis    • Gout 2016   • Hearing difficulty    • History of shingles    • Hyperlipidemia    • Hypertension    • Hypogonadism in male    • Insomnia    • Nephrolithiasis    • Osteopenia    • Osteopenia    • Pruritus 1/15/2021    • Renal insufficiency    • Ulcerative colitis    • Venous stasis dermatitis of both lower extremities 1/15/2021     Past Surgical History:   Procedure Laterality Date   • CIRCUMCISION N/A 6/16/2021    Procedure: CIRCUMCISION;  Surgeon: Milton Forrest MD;  Location: [facility] MAIN OR;  Service: Urology;  Laterality: N/A;   • COLONOSCOPY  06/11/2015    NBIH, actuve colitis w/cryptitis, rare crypt abscess & superficial erosion, hyperplastic polyp   • COLONOSCOPY N/A 8/8/2017    IH, nodular mucosa in the proximal rectum and in the distal rectum, Diverticulosis in the sigmoid colon, the descending colon and at the splenic flexure.  PATH: Hyperplastic polyps    • COLONOSCOPY N/A 11/19/2019    Procedure: COLONOSCOPY to cecum with biopsies;  Surgeon: Calvin Gifford MD;  Location: [facility] ENDOSCOPY;  Service: Gastroenterology   • EYE SURGERY      cataract   • KIDNEY STONE SURGERY     • TUMOR EXCISION      rectal tumor     Family History   Problem Relation Age of Onset   • Hypertension Mother    • Stroke Mother    • Heart disease Mother    • Asthma Sister    • Ulcerative colitis Sister    • Emphysema Sister    • Lung disease Father         mesothelioma   • Glaucoma Father    • Diabetes Son    • Malig Hyperthermia Neg Hx        Current Outpatient Medications:   •  amLODIPine (NORVASC) 10 MG tablet, TAKE ONE TABLET BY MOUTH DAILY, Disp: 90 tablet, Rfl: 3  •  atenolol (TENORMIN) 50 MG tablet, TAKE ONE TABLET BY MOUTH DAILY, Disp: 90 tablet, Rfl: 0  •  atorvastatin (Lipitor) 20 MG tablet, Take 3 tablets by mouth Daily. (Patient taking differently: Take 2 tablets by mouth Daily.), Disp: 270 tablet, Rfl: 3  •  Cholecalciferol (VITAMIN D3) 1000 UNITS capsule, Take 1 capsule by mouth Daily., Disp: , Rfl:   •  clopidogrel (PLAVIX) 75 MG tablet, TAKE ONE TABLET BY MOUTH DAILY, Disp: 90 tablet, Rfl: 3  •  Cyanocobalamin (Vitamin B12) 1000 MCG tablet controlled-release, Take 1,000 mcg by mouth Daily., Disp: , Rfl:   •   hydroCHLOROthiazide (HYDRODIURIL) 12.5 MG tablet, TAKE ONE TABLET BY MOUTH DAILY FOR BLOOD PRESSURE, Disp: 90 tablet, Rfl: 3  •  lisinopril (PRINIVIL,ZESTRIL) 40 MG tablet, TAKE ONE TABLET BY MOUTH DAILY, Disp: 90 tablet, Rfl: 3  •  Multiple Vitamins-Minerals (CENTRUM SILVER ADULT 50+ PO), Take 1 tablet by mouth Daily. HOLD PER MD INSTR, Disp: , Rfl:   •  Omega-3 Fatty Acids (FISH OIL) 1200 MG capsule capsule, Take 1 capsule by mouth Daily With Breakfast. HOLD PER MD INSTR, Disp: , Rfl:   •  sulfaSALAzine (AZULFIDINE) 500 MG tablet, Take 2 tablets by mouth 3 (Three) Times a Day., Disp: 180 tablet, Rfl: 11  •  vitamin C (ASCORBIC ACID) 500 MG tablet, Take 2 tablets by mouth Daily. HOLD PER MD INSTR, Disp: , Rfl:   •  Wheat Dextrin (BENEFIBER PO), Take  by mouth., Disp: , Rfl:   •  zolpidem CR (AMBIEN CR) 6.25 MG CR tablet, TAKE ONE TABLET BY MOUTH ONCE NIGHTLY AS NEEDED FOR SLEEP, Disp: 90 tablet, Rfl: 1  •  azithromycin (Zithromax) 250 MG tablet, Take 2 tablets the first day, then 1 tablet daily for 4 days., Disp: 6 tablet, Rfl: 0  •  baclofen (LIORESAL) 10 MG tablet, Take 1 tablet by mouth 3 (Three) Times a Day As Needed for Muscle Spasms., Disp: 60 tablet, Rfl: 1    Current Facility-Administered Medications:   •  albuterol (PROVENTIL) nebulizer solution 0.5% 2.5 mg/0.5mL, 2.5 mg, Nebulization, Once, Ector Sykes MD  Allergies   Allergen Reactions   • Ciprofloxacin Rash     Pt unsure reaction     Social History     Tobacco Use   • Smoking status: Former     Packs/day: 0.25     Years: 20.00     Pack years: 5.00     Types: Cigarettes     Quit date:      Years since quittin.4   • Smokeless tobacco: Former   Vaping Use   • Vaping Use: Never used   Substance Use Topics   • Alcohol use: Yes     Alcohol/week: 2.0 standard drinks     Types: 2 Shots of liquor per week     Comment: socially   • Drug use: No          Objective   Physical Exam  Vitals:    23 1350   BP: 118/58   Pulse: 62   Temp: 97.5 °F (36.4  °C)   TempSrc: Infrared   SpO2: 95%     There is no height or weight on file to calculate BMI.    Constitutional: NAD.  Eyes: EOMI. PERRLA. Normal conjunctiva.  Ear, nose, mouth, throat: No tonsillar exudates or erythema.   Normal nasal mucosa. Normal external ear canals and TMs bilaterally.  Cardiovascular: RRR. No murmurs. No LE edema b/l. Radial pulses 2+ bilaterally.  Pulmonary: CTA b/l. Good effort.  Integumentary: No rashes or wounds on face or upper extremities.  Lymphatic: No anterior cervical lymphadenopathy.  Endocrine: No thyromegaly or palpable thyroid nodules.  Psychiatric: Normal affect. Normal thought content.       Assessment & Plan   Assessment and Plan  Pleasant 83-year-old male with hypertension, hyperlipidemia, ulcerative colitis, CKD 3A, history of CVA, meningioma, osteopenia, history of gout, insomnia, among other problems, who presents with the following:    Diagnoses and all orders for this visit:    1. Bronchitis (Primary): Due to history of bronchitis and comorbidities recommend treatment for bronchitis with azithromycin.  Symptoms fairly mild currently and examination is unremarkable.  -     azithromycin (Zithromax) 250 MG tablet; Take 2 tablets the first day, then 1 tablet daily for 4 days.  Dispense: 6 tablet; Refill: 0    2. Dysuria: Urinalysis and culture before any treatment.  Symptoms remain intermittent.  Has urology follow-up.  -     Urine Culture - Urine, Urine, Clean Catch  -     Urinalysis With Microscopic - Urine, Clean Catch    3. Hand cramps: Tolerating well without side effect  -     baclofen (LIORESAL) 10 MG tablet; Take 1 tablet by mouth 3 (Three) Times a Day As Needed for Muscle Spasms.  Dispense: 60 tablet; Refill: 1    Ector Sykes MD  Family Medicine  O: [phone]    Disclaimer: Parts of this note were dictated by speech recognition. Minor errors in transcription may be present. Please call if questions.

## 2023-05-19 ENCOUNTER — TELEPHONE (OUTPATIENT)
Dept: INTERNAL MEDICINE | Facility: CLINIC | Age: 83
End: 2023-05-19
Payer: MEDICARE

## 2023-05-19 NOTE — TELEPHONE ENCOUNTER
Caller: Bernard Resendez    Relationship: Self    Best call back number: [phone]    What was the call regarding: PATIENT STATED DR CAMPBELL PRESCRIBED HIM azithromycin (Zithromax) 250 MG tablet FOR HIS COUGH.    HE STATED HIS COUGH HAS GOTTEN WORSE, AND HE WOULD LIKE TO KNOW IF DR CAMPBELL WOULD WANT TO CHANGE THE MEDICATION.    PLEASE CALL TO ADVISE.    Do you require a callback: YES

## 2023-05-19 NOTE — TELEPHONE ENCOUNTER
No, has been on antibiotics for less than 24 hours at the time of the call  Go to ER if worsening or feeling very ill

## 2023-05-20 LAB
APPEARANCE UR: ABNORMAL
BACTERIA #/AREA URNS HPF: NORMAL /[HPF]
BACTERIA UR CULT: NORMAL
BACTERIA UR CULT: NORMAL
BILIRUB UR QL STRIP: NEGATIVE
CASTS URNS QL MICRO: NORMAL /LPF
COLOR UR: YELLOW
EPI CELLS #/AREA URNS HPF: NORMAL /HPF (ref 0–10)
GLUCOSE UR QL STRIP: NEGATIVE
HGB UR QL STRIP: NEGATIVE
KETONES UR QL STRIP: NEGATIVE
LEUKOCYTE ESTERASE UR QL STRIP: ABNORMAL
MICRO URNS: ABNORMAL
MUCOUS THREADS URNS QL MICRO: PRESENT
NITRITE UR QL STRIP: NEGATIVE
PH UR STRIP: 5.5 [PH] (ref 5–7.5)
PROT UR QL STRIP: ABNORMAL
RBC #/AREA URNS HPF: NORMAL /HPF (ref 0–2)
SP GR UR STRIP: 1.02 (ref 1–1.03)
UROBILINOGEN UR STRIP-MCNC: 0.2 MG/DL (ref 0.2–1)
WBC #/AREA URNS HPF: NORMAL /HPF (ref 0–5)

## 2023-05-22 NOTE — PROGRESS NOTES
Your urine studies do not show any significant abnormality. Make sure you follow up with your urologist

## 2023-05-23 NOTE — TELEPHONE ENCOUNTER
Spoke to pt about provider message, pt understanding and has no questions. Pt states he feels significantly better. Pt states that he does still have a slight cough and if does not continue to improve, he will make an appt.

## 2023-05-24 ENCOUNTER — TELEPHONE (OUTPATIENT)
Dept: NEUROLOGY | Facility: OTHER | Age: 83
End: 2023-05-24

## 2023-05-24 ENCOUNTER — TELEPHONE (OUTPATIENT)
Dept: NEUROLOGY | Facility: CLINIC | Age: 83
End: 2023-05-24
Payer: MEDICARE

## 2023-05-24 NOTE — TELEPHONE ENCOUNTER
PATIENT CALLING TO RESCHEDULE F/U APPT WITH DR MORROW ON 5/31/23 AT 1:00    PLEASE CONTACT PT AND RESCHEDULE    THANK YOU

## 2023-05-24 NOTE — TELEPHONE ENCOUNTER
PT CALLING TO ADVISE HIS FOLLOW UP APPT WITH DR MORROW ON 5/31/23 NEEDS TO BE RESCHEDULED    PLEASE CONTACT PATIENT TO RESCHEDULE    THANK YOU

## 2023-05-25 ENCOUNTER — APPOINTMENT (OUTPATIENT)
Dept: CT IMAGING | Facility: HOSPITAL | Age: 83
End: 2023-05-25
Payer: MEDICARE

## 2023-05-25 ENCOUNTER — HOSPITAL ENCOUNTER (OUTPATIENT)
Dept: CT IMAGING | Facility: HOSPITAL | Age: 83
Discharge: HOME | End: 2023-05-25
Admitting: PSYCHIATRY & NEUROLOGY
Payer: MEDICARE

## 2023-05-25 DIAGNOSIS — I69.30 CHRONIC ARTERIAL ISCHEMIC STROKE: ICD-10-CM

## 2023-05-25 DIAGNOSIS — I67.2 INTRACRANIAL ATHEROSCLEROSIS: ICD-10-CM

## 2023-05-25 PROCEDURE — 70496 CT ANGIOGRAPHY HEAD: CPT

## 2023-05-25 PROCEDURE — 25510000001 IOPAMIDOL PER 1 ML: Performed by: PSYCHIATRY & NEUROLOGY

## 2023-05-25 PROCEDURE — 70498 CT ANGIOGRAPHY NECK: CPT

## 2023-05-25 RX ADMIN — IOPAMIDOL 95 ML: 755 INJECTION, SOLUTION INTRAVENOUS at 13:22

## 2023-05-26 DIAGNOSIS — R93.89 ABNORMAL CT SCAN, NECK: Primary | ICD-10-CM

## 2023-05-30 ENCOUNTER — TELEPHONE (OUTPATIENT)
Dept: NEUROLOGY | Facility: CLINIC | Age: 83
End: 2023-05-30

## 2023-05-30 NOTE — TELEPHONE ENCOUNTER
----- Message from Fady Riddle MD sent at 5/26/2023  3:27 PM EDT -----  Regarding: CTA results. follow up CT chest  This patient had a follow up CTA of the neck. That looked stable but Dr. Campbell called me and said they wanted us to check a CT of the chest to look at a new lung nodule they saw in the left upper lung. I have ordered that. Can you let the patient know next week?    Thanks  Mitul

## 2023-05-30 NOTE — TELEPHONE ENCOUNTER
Spoke with patient regarding results and recommendation for CT chest to be completed.  Patient voiced understanding.

## 2023-05-31 LAB — CREAT BLDA-MCNC: 1.3 MG/DL (ref 0.6–1.3)

## 2023-06-04 DIAGNOSIS — I10 ESSENTIAL HYPERTENSION: ICD-10-CM

## 2023-06-05 RX ORDER — LISINOPRIL 40 MG/1
TABLET ORAL
Qty: 90 TABLET | Refills: 3 | Status: SHIPPED | OUTPATIENT
Start: 2023-06-05

## 2023-06-14 ENCOUNTER — TELEPHONE (OUTPATIENT)
Dept: NEUROLOGY | Facility: CLINIC | Age: 83
End: 2023-06-14
Payer: MEDICARE

## 2023-06-14 NOTE — TELEPHONE ENCOUNTER
Called patient to offer a sooner appointment with Dr. Riddle.  He states that he cannot come on 6/19/23.  Left patient on wait list.

## 2023-06-16 ENCOUNTER — HOSPITAL ENCOUNTER (OUTPATIENT)
Dept: CT IMAGING | Facility: HOSPITAL | Age: 83
Discharge: HOME | End: 2023-06-16
Payer: MEDICARE

## 2023-06-16 DIAGNOSIS — R93.89 ABNORMAL CT SCAN, NECK: ICD-10-CM

## 2023-06-16 LAB — CREAT BLDA-MCNC: 1.3 MG/DL (ref 0.6–1.3)

## 2023-06-16 PROCEDURE — 71260 CT THORAX DX C+: CPT

## 2023-06-16 PROCEDURE — 82565 ASSAY OF CREATININE: CPT

## 2023-06-16 PROCEDURE — 25510000001 IOPAMIDOL 61 % SOLUTION: Performed by: PSYCHIATRY & NEUROLOGY

## 2023-06-16 RX ADMIN — IOPAMIDOL 85 ML: 612 INJECTION, SOLUTION INTRAVENOUS at 11:31

## 2023-07-17 PROBLEM — R91.8 LUNG NODULES: Status: ACTIVE | Noted: 2023-07-17

## 2023-07-24 PROBLEM — D32.9 MENINGIOMA: Status: RESOLVED | Noted: 2022-01-27 | Resolved: 2023-07-24

## 2023-08-02 ENCOUNTER — OFFICE VISIT (OUTPATIENT)
Dept: NEUROSURGERY | Facility: CLINIC | Age: 83
End: 2023-08-02
Payer: MEDICARE

## 2023-08-02 DIAGNOSIS — D32.9 MENINGIOMA: Primary | ICD-10-CM

## 2023-08-02 PROCEDURE — 1159F MED LIST DOCD IN RCRD: CPT | Performed by: NEUROLOGICAL SURGERY

## 2023-08-02 PROCEDURE — 99213 OFFICE O/P EST LOW 20 MIN: CPT | Performed by: NEUROLOGICAL SURGERY

## 2023-08-02 PROCEDURE — 1160F RVW MEDS BY RX/DR IN RCRD: CPT | Performed by: NEUROLOGICAL SURGERY

## 2023-08-13 DIAGNOSIS — I10 ESSENTIAL HYPERTENSION: ICD-10-CM

## 2023-08-14 RX ORDER — ATENOLOL 50 MG/1
TABLET ORAL
Qty: 90 TABLET | Refills: 0 | Status: SHIPPED | OUTPATIENT
Start: 2023-08-14

## 2023-09-19 DIAGNOSIS — G47.00 INSOMNIA, UNSPECIFIED TYPE: ICD-10-CM

## 2023-09-19 RX ORDER — ZOLPIDEM TARTRATE 6.25 MG/1
TABLET, FILM COATED, EXTENDED RELEASE ORAL
Qty: 90 TABLET | Refills: 1 | Status: SHIPPED | OUTPATIENT
Start: 2023-09-22

## 2023-09-19 NOTE — TELEPHONE ENCOUNTER
Rx Refill Note  Requested Prescriptions     Pending Prescriptions Disp Refills    zolpidem CR (AMBIEN CR) 6.25 MG CR tablet [Pharmacy Med Name: ZOLPIDEM TART ER 6.25 MG TAB] 90 tablet      Sig: TAKE ONE TABLET BY MOUTH ONCE NIGHTLY AS NEEDED FOR SLEEP      Last office visit with prescribing clinician: 5/18/2023   Last telemedicine visit with prescribing clinician: Visit date not found   Next office visit with prescribing clinician: Visit date not found          [facility] Select 13 (MW) - Urine, Clean Catch (03/30/2021 13:32)                    Would you like a call back once the refill request has been completed: [] Yes [] No    If the office needs to give you a call back, can they leave a voicemail: [] Yes [] No    Elise Ayala MA  09/19/23, 09:46 EDT

## 2023-10-11 NOTE — H&P (VIEW-ONLY)
Chief Complaint   Patient presents with   • Ulcerative Colitis       Bernard Resendez is a  79 y.o. male here for a follow up visit for left-sided ulcerative colitis greater than 30 years    79-year-old with left-sided ulcerative colitis diagnosed 30 years ago.  August 2017 surveillance colonoscopy without dysphasia  He continues on balsalazide 3 tablets 3 times a day  He did have a flare 5 months ago requiring steroid taper  Currently no rectal pain, rectal bleeding, diarrhea, abdominal pain or extraintestinal manifestations of inflammatory bowel disease  CBC and CMP 4 months ago were normal        Past Medical History:   Diagnosis Date   • Acute renal failure (CMS/HCC)    • Diverticulosis    • Hearing difficulty    • History of shingles    • Hyperlipidemia    • Hypertension    • Hypogonadism in male    • Insomnia    • Nephrolithiasis    • Osteopenia    • Osteopenia    • Renal insufficiency    • Ulcerative colitis (CMS/HCC)        Past Surgical History:   Procedure Laterality Date   • COLONOSCOPY  06/11/2015    NBIH, actuve colitis w/cryptitis, rare crypt abscess & superficial erosion, hyperplastic polyp   • COLONOSCOPY N/A 8/8/2017    IH, nodular mucosa in the proximal rectum and in the distal rectum, Diverticulosis in the sigmoid colon, the descending colon and at the splenic flexure.  PATH: Hyperplastic polyps    • EYE SURGERY      cataract   • KIDNEY STONE SURGERY     • TUMOR EXCISION      rectal tumor       Scheduled Meds:    Continuous Infusions:  No current facility-administered medications for this visit.     PRN Meds:.    Allergies   Allergen Reactions   • Ciprofloxacin        Social History     Socioeconomic History   • Marital status:      Spouse name: Not on file   • Number of children: Not on file   • Years of education: Not on file   • Highest education level: Not on file   Tobacco Use   • Smoking status: Former Smoker     Packs/day: 0.25     Types: Cigarettes     Last attempt to quit: 1981      Years since quittin.8   • Smokeless tobacco: Former User   Substance and Sexual Activity   • Alcohol use: Yes     Alcohol/week: 1.2 oz     Types: 2 Shots of liquor per week     Comment: socially   • Drug use: No   • Sexual activity: Defer       Family History   Problem Relation Age of Onset   • Hypertension Mother    • Stroke Mother    • Asthma Sister    • Ulcerative colitis Sister        Review of Systems   Gastrointestinal: Negative for abdominal distention, anal bleeding, constipation, nausea and vomiting.   All other systems reviewed and are negative.      There were no vitals filed for this visit.    Physical Exam   Constitutional: He is oriented to person, place, and time. He appears well-developed and well-nourished.   HENT:   Head: Normocephalic and atraumatic.   Eyes: Conjunctivae and EOM are normal.   Neck: Normal range of motion. No tracheal deviation present.   Cardiovascular: Normal rate and regular rhythm.   Pulmonary/Chest: Effort normal and breath sounds normal. No respiratory distress.   Abdominal: Soft. Bowel sounds are normal. He exhibits no distension and no mass. There is no tenderness. There is no rebound and no guarding.   Musculoskeletal: Normal range of motion.   Neurological: He is alert and oriented to person, place, and time.   Skin: Skin is warm and dry.   Psychiatric: He has a normal mood and affect. Judgment normal.   Nursing note and vitals reviewed.      No images are attached to the encounter.    Problem list    Should have colitis for 30 years left-sided  Recent flare but he has moved through it with steroids  Off of steroids now  Continues on balsalazide  Surveillance colonoscopy needed this year      Assessment/Plan    Colonoscopy 2019  Office visit 6 months  Continue balsalazide  Further recommendations after colonoscopy is complete and biopsies reviewed   Fellow

## 2023-11-02 ENCOUNTER — OFFICE VISIT (OUTPATIENT)
Dept: INTERNAL MEDICINE | Facility: CLINIC | Age: 83
End: 2023-11-02
Payer: MEDICARE

## 2023-11-02 VITALS
WEIGHT: 254.2 LBS | HEIGHT: 71 IN | BODY MASS INDEX: 35.59 KG/M2 | DIASTOLIC BLOOD PRESSURE: 60 MMHG | HEART RATE: 63 BPM | OXYGEN SATURATION: 93 % | SYSTOLIC BLOOD PRESSURE: 122 MMHG | TEMPERATURE: 97.2 F

## 2023-11-02 DIAGNOSIS — Z87.39 HISTORY OF GOUT: ICD-10-CM

## 2023-11-02 DIAGNOSIS — R91.8 LUNG NODULES: ICD-10-CM

## 2023-11-02 DIAGNOSIS — E78.5 HYPERLIPIDEMIA, UNSPECIFIED HYPERLIPIDEMIA TYPE: ICD-10-CM

## 2023-11-02 DIAGNOSIS — R73.09 ELEVATED GLUCOSE: ICD-10-CM

## 2023-11-02 DIAGNOSIS — I10 PRIMARY HYPERTENSION: ICD-10-CM

## 2023-11-02 DIAGNOSIS — R06.02 EXERTIONAL SHORTNESS OF BREATH: Primary | ICD-10-CM

## 2023-11-02 DIAGNOSIS — N18.31 STAGE 3A CHRONIC KIDNEY DISEASE: ICD-10-CM

## 2023-11-02 RX ORDER — ALBUTEROL SULFATE 90 UG/1
2 AEROSOL, METERED RESPIRATORY (INHALATION) EVERY 4 HOURS PRN
Qty: 6.7 G | Refills: 3 | Status: SHIPPED | OUTPATIENT
Start: 2023-11-02

## 2023-11-02 NOTE — PROGRESS NOTES
Date of Encounter: 2023  Patient: Bernard Resendez,  1940    Subjective   History of Presenting Illness  Chief complaint: Shortness of breath    4 to 5 months of increasing shortness of breath with exertion.  Examples of this type of exertion include walking about 2 blocks.  He occasionally does have wheezing.  He does not notice a cough.  He has not been checking his blood pressure at home.  He is not currently a smoker.  He denies chest pressure, stomach discomfort, and chest pain during these episodes.  No dizziness.  Symptoms are not improving but they are also not worsening.  He is not doing dedicated exercise but is considering it.  He does have a distant history of PFTs to evaluate for possible COPD, but these are not in our system and it looks like were almost 10 years ago.    The following portions of the patient's history were reviewed and updated as appropriate: allergies, current medications, past family history, past medical history, past social history, past surgical history and problem list.    Patient Active Problem List   Diagnosis    Stage 3a chronic kidney disease    Hyperlipidemia    Hypogonadism in male    Insomnia d/t anxiety    Osteopenia    Hypertension    Colon polyps    Ulcerative colitis without complications    Obesity, Class I, BMI 30-34.9    DNR (do not resuscitate)    History of gout    Meningioma    Cerebrovascular accident (CVA) of pontine structure    Mild cognitive impairment    Hand cramps    Lung nodules     Past Medical History:   Diagnosis Date    Acute CVA (cerebrovascular accident) 2022    Acute renal failure     Cancer     SKIN LESION REMOVED    Diverticulosis     Gout 2016    Hearing difficulty     History of shingles     Hyperlipidemia     Hypertension     Hypogonadism in male     Insomnia     Meningioma 2022    Nephrolithiasis     Osteopenia     Osteopenia     Pruritus 01/15/2021    Renal insufficiency     Ulcerative colitis     Venous stasis  dermatitis of both lower extremities 01/15/2021     Past Surgical History:   Procedure Laterality Date    CIRCUMCISION N/A 6/16/2021    Procedure: CIRCUMCISION;  Surgeon: Milton Forrest MD;  Location: [facility] MAIN OR;  Service: Urology;  Laterality: N/A;    COLONOSCOPY  06/11/2015    NBIH, actuve colitis w/cryptitis, rare crypt abscess & superficial erosion, hyperplastic polyp    COLONOSCOPY N/A 8/8/2017    IH, nodular mucosa in the proximal rectum and in the distal rectum, Diverticulosis in the sigmoid colon, the descending colon and at the splenic flexure.  PATH: Hyperplastic polyps     COLONOSCOPY N/A 11/19/2019    Procedure: COLONOSCOPY to cecum with biopsies;  Surgeon: Calvin Gifford MD;  Location: [facility] ENDOSCOPY;  Service: Gastroenterology    EYE SURGERY      cataract    KIDNEY STONE SURGERY      TUMOR EXCISION      rectal tumor     Family History   Problem Relation Age of Onset    Hypertension Mother     Stroke Mother     Heart disease Mother     Asthma Sister     Ulcerative colitis Sister     Emphysema Sister     Lung disease Father         mesothelioma    Glaucoma Father     Diabetes Son     Malig Hyperthermia Neg Hx        Current Outpatient Medications:     amLODIPine (NORVASC) 10 MG tablet, TAKE ONE TABLET BY MOUTH DAILY, Disp: 90 tablet, Rfl: 3    atenolol (TENORMIN) 50 MG tablet, TAKE ONE TABLET BY MOUTH DAILY, Disp: 90 tablet, Rfl: 0    atorvastatin (Lipitor) 20 MG tablet, Take 3 tablets by mouth Daily., Disp: 270 tablet, Rfl: 3    Cholecalciferol (VITAMIN D3) 1000 UNITS capsule, Take 1 capsule by mouth Daily., Disp: , Rfl:     clopidogrel (PLAVIX) 75 MG tablet, TAKE ONE TABLET BY MOUTH DAILY, Disp: 90 tablet, Rfl: 3    Cyanocobalamin (Vitamin B12) 1000 MCG tablet controlled-release, Take 1,000 mcg by mouth Daily., Disp: , Rfl:     lisinopril (PRINIVIL,ZESTRIL) 40 MG tablet, TAKE ONE TABLET BY MOUTH DAILY, Disp: 90 tablet, Rfl: 3    Multiple Vitamins-Minerals (CENTRUM SILVER ADULT 50+ PO),  "Take 1 tablet by mouth Daily. HOLD PER MD INSTR, Disp: , Rfl:     Omega-3 Fatty Acids (FISH OIL) 1200 MG capsule capsule, Take 1 capsule by mouth Daily With Breakfast. HOLD PER MD INSTR, Disp: , Rfl:     sulfaSALAzine (AZULFIDINE) 500 MG tablet, Take 2 tablets by mouth 3 (Three) Times a Day., Disp: 180 tablet, Rfl: 11    vitamin C (ASCORBIC ACID) 500 MG tablet, Take 2 tablets by mouth Daily. HOLD PER MD INSTR, Disp: , Rfl:     Wheat Dextrin (BENEFIBER PO), Take  by mouth., Disp: , Rfl:     zolpidem CR (AMBIEN CR) 6.25 MG CR tablet, TAKE ONE TABLET BY MOUTH ONCE NIGHTLY AS NEEDED FOR SLEEP, Disp: 90 tablet, Rfl: 1    albuterol sulfate  (90 Base) MCG/ACT inhaler, Inhale 2 puffs Every 4 (Four) Hours As Needed for Wheezing., Disp: 6.7 g, Rfl: 3    Current Facility-Administered Medications:     albuterol (PROVENTIL) nebulizer solution 0.5% 2.5 mg/0.5mL, 2.5 mg, Nebulization, Once, Ector Sykes MD  Allergies   Allergen Reactions    Ciprofloxacin Rash     Pt unsure reaction     Social History     Tobacco Use    Smoking status: Former     Packs/day: 0.25     Years: 20.00     Additional pack years: 0.00     Total pack years: 5.00     Types: Cigarettes     Quit date:      Years since quittin.8    Smokeless tobacco: Former   Vaping Use    Vaping Use: Never used   Substance Use Topics    Alcohol use: Yes     Alcohol/week: 2.0 standard drinks of alcohol     Types: 2 Shots of liquor per week     Comment: socially    Drug use: No          Objective   Physical Exam  Vitals:    23 1301   BP: 122/60   BP Location: Left arm   Patient Position: Sitting   Cuff Size: Large Adult   Pulse: 63   Temp: 97.2 °F (36.2 °C)   TempSrc: Infrared   SpO2: 93%   Weight: 115 kg (254 lb 3.2 oz)   Height: 180.3 cm (71\")     Body mass index is 35.45 kg/m².    Constitutional: NAD.  No shortness of breath at rest.  Cardiovascular: RRR. No murmurs. No LE edema b/l.   Pulmonary: CTA b/l. Good effort.  Fair effort.  Integumentary: No " rashes or wounds on face or upper extremities.  Psychiatric: Normal affect. Normal thought content.     Assessment & Plan   Assessment and Plan  Pleasant 83-year-old male with hypertension, hyperlipidemia, ulcerative colitis, CKD 3A, history of CVA, meningioma, osteopenia, history of gout, insomnia, among other problems, who presents with the following:     Diagnoses and all orders for this visit:    1. Exertional shortness of breath (Primary): No angina or chest pain.  EKG, echocardiogram last year were fairly reassuring in this context.  We will refer to pulmonology for spirometry, also get blood work as below.  Albuterol for impaired symptom control.  Likely a component of deconditioning. If symptoms worsening he will contact our office.  -     albuterol sulfate  (90 Base) MCG/ACT inhaler; Inhale 2 puffs Every 4 (Four) Hours As Needed for Wheezing.  Dispense: 6.7 g; Refill: 3  -     Ambulatory Referral to Pulmonology    2. Primary hypertension: Stop hydrochlorothiazide, manual blood pressure is 110/52.  This may be contributing to his symptoms.  He will check his blood pressure at home.  Cannot let this go too high in context of CKD.  -     Urinalysis With Microscopic - Urine, Clean Catch    3. Hyperlipidemia, unspecified hyperlipidemia type  -     CBC & Differential  -     Comprehensive Metabolic Panel  -     Lipid Panel  -     Thyroid Panel With TSH    4. Stage 3a chronic kidney disease  -     Vitamin D,25-Hydroxy    5. History of gout  -     Uric Acid    6. Elevated glucose  -     Hemoglobin A1c    7. Lung nodules: Needs to scheduled follow-up scan which was ordered and is due  -     Ambulatory Referral to Pulmonology    Ector Sykes MD  Family Medicine  O: [phone]    Disclaimer: Parts of this note were dictated by speech recognition. Minor errors in transcription may be present. Please call if questions.

## 2023-11-03 LAB
25(OH)D3+25(OH)D2 SERPL-MCNC: 34.6 NG/ML (ref 30–100)
ALBUMIN SERPL-MCNC: 4.1 G/DL (ref 3.7–4.7)
ALBUMIN/GLOB SERPL: 2 {RATIO} (ref 1.2–2.2)
ALP SERPL-CCNC: 80 IU/L (ref 44–121)
ALT SERPL-CCNC: 13 IU/L (ref 0–44)
APPEARANCE UR: CLEAR
AST SERPL-CCNC: 20 IU/L (ref 0–40)
BACTERIA #/AREA URNS HPF: NORMAL /[HPF]
BASOPHILS # BLD AUTO: 0.1 X10E3/UL (ref 0–0.2)
BASOPHILS NFR BLD AUTO: 1 %
BILIRUB SERPL-MCNC: 0.3 MG/DL (ref 0–1.2)
BILIRUB UR QL STRIP: NEGATIVE
BUN SERPL-MCNC: 24 MG/DL (ref 8–27)
BUN/CREAT SERPL: 17 (ref 10–24)
CALCIUM SERPL-MCNC: 9.2 MG/DL (ref 8.6–10.2)
CASTS URNS QL MICRO: NORMAL /LPF
CHLORIDE SERPL-SCNC: 103 MMOL/L (ref 96–106)
CHOLEST SERPL-MCNC: 161 MG/DL (ref 100–199)
CO2 SERPL-SCNC: 27 MMOL/L (ref 20–29)
COLOR UR: YELLOW
CREAT SERPL-MCNC: 1.42 MG/DL (ref 0.76–1.27)
EGFRCR SERPLBLD CKD-EPI 2021: 48 ML/MIN/1.73
EOSINOPHIL # BLD AUTO: 0.2 X10E3/UL (ref 0–0.4)
EOSINOPHIL NFR BLD AUTO: 2 %
EPI CELLS #/AREA URNS HPF: NORMAL /HPF (ref 0–10)
ERYTHROCYTE [DISTWIDTH] IN BLOOD BY AUTOMATED COUNT: 13.1 % (ref 11.6–15.4)
FT4I SERPL CALC-MCNC: 2.2 (ref 1.2–4.9)
GLOBULIN SER CALC-MCNC: 2.1 G/DL (ref 1.5–4.5)
GLUCOSE SERPL-MCNC: 119 MG/DL (ref 70–99)
GLUCOSE UR QL STRIP: NEGATIVE
HBA1C MFR BLD: 5 % (ref 4.8–5.6)
HCT VFR BLD AUTO: 40.9 % (ref 37.5–51)
HDLC SERPL-MCNC: 44 MG/DL
HGB BLD-MCNC: 13.2 G/DL (ref 13–17.7)
HGB UR QL STRIP: NEGATIVE
IMM GRANULOCYTES # BLD AUTO: 0 X10E3/UL (ref 0–0.1)
IMM GRANULOCYTES NFR BLD AUTO: 1 %
KETONES UR QL STRIP: NEGATIVE
LDLC SERPL CALC-MCNC: 88 MG/DL (ref 0–99)
LEUKOCYTE ESTERASE UR QL STRIP: NEGATIVE
LYMPHOCYTES # BLD AUTO: 1.6 X10E3/UL (ref 0.7–3.1)
LYMPHOCYTES NFR BLD AUTO: 19 %
MCH RBC QN AUTO: 29.5 PG (ref 26.6–33)
MCHC RBC AUTO-ENTMCNC: 32.3 G/DL (ref 31.5–35.7)
MCV RBC AUTO: 91 FL (ref 79–97)
MICRO URNS: NORMAL
MICRO URNS: NORMAL
MONOCYTES # BLD AUTO: 0.6 X10E3/UL (ref 0.1–0.9)
MONOCYTES NFR BLD AUTO: 7 %
NEUTROPHILS # BLD AUTO: 5.9 X10E3/UL (ref 1.4–7)
NEUTROPHILS NFR BLD AUTO: 70 %
NITRITE UR QL STRIP: NEGATIVE
PH UR STRIP: 6 [PH] (ref 5–7.5)
PLATELET # BLD AUTO: 242 X10E3/UL (ref 150–450)
POTASSIUM SERPL-SCNC: 4.7 MMOL/L (ref 3.5–5.2)
PROT SERPL-MCNC: 6.2 G/DL (ref 6–8.5)
PROT UR QL STRIP: NEGATIVE
RBC # BLD AUTO: 4.48 X10E6/UL (ref 4.14–5.8)
RBC #/AREA URNS HPF: NORMAL /HPF (ref 0–2)
SODIUM SERPL-SCNC: 145 MMOL/L (ref 134–144)
SP GR UR STRIP: 1.02 (ref 1–1.03)
T3RU NFR SERPL: 28 % (ref 24–39)
T4 SERPL-MCNC: 7.9 UG/DL (ref 4.5–12)
TRIGL SERPL-MCNC: 171 MG/DL (ref 0–149)
TSH SERPL DL<=0.005 MIU/L-ACNC: 4.25 UIU/ML (ref 0.45–4.5)
URATE SERPL-MCNC: 8.1 MG/DL (ref 3.8–8.4)
UROBILINOGEN UR STRIP-MCNC: 0.2 MG/DL (ref 0.2–1)
VLDLC SERPL CALC-MCNC: 29 MG/DL (ref 5–40)
WBC # BLD AUTO: 8.4 X10E3/UL (ref 3.4–10.8)
WBC #/AREA URNS HPF: NORMAL /HPF (ref 0–5)

## 2023-11-08 RX ORDER — SULFASALAZINE 500 MG/1
1000 TABLET ORAL 3 TIMES DAILY
Qty: 180 TABLET | Refills: 11 | Status: SHIPPED | OUTPATIENT
Start: 2023-11-08

## 2023-11-18 DIAGNOSIS — I10 ESSENTIAL HYPERTENSION: ICD-10-CM

## 2023-11-18 DIAGNOSIS — E78.5 HYPERLIPIDEMIA, UNSPECIFIED HYPERLIPIDEMIA TYPE: ICD-10-CM

## 2023-11-20 RX ORDER — ATORVASTATIN CALCIUM 20 MG/1
60 TABLET, FILM COATED ORAL DAILY
Qty: 270 TABLET | Refills: 3 | Status: SHIPPED | OUTPATIENT
Start: 2023-11-20

## 2023-11-20 RX ORDER — ATENOLOL 50 MG/1
50 TABLET ORAL DAILY
Qty: 90 TABLET | Refills: 0 | Status: SHIPPED | OUTPATIENT
Start: 2023-11-20

## 2023-11-28 ENCOUNTER — OFFICE VISIT (OUTPATIENT)
Dept: INTERNAL MEDICINE | Facility: CLINIC | Age: 83
End: 2023-11-28
Payer: MEDICARE

## 2023-11-28 VITALS
DIASTOLIC BLOOD PRESSURE: 72 MMHG | SYSTOLIC BLOOD PRESSURE: 130 MMHG | TEMPERATURE: 97.7 F | WEIGHT: 260.4 LBS | BODY MASS INDEX: 36.46 KG/M2 | HEIGHT: 71 IN | OXYGEN SATURATION: 96 % | HEART RATE: 72 BPM

## 2023-11-28 DIAGNOSIS — Z86.73 CHRONIC ARTERIAL ISCHEMIC STROKE: ICD-10-CM

## 2023-11-28 DIAGNOSIS — M25.562 CHRONIC PAIN OF LEFT KNEE: ICD-10-CM

## 2023-11-28 DIAGNOSIS — G89.29 CHRONIC PAIN OF LEFT KNEE: ICD-10-CM

## 2023-11-28 DIAGNOSIS — I67.2 INTRACRANIAL ATHEROSCLEROSIS: ICD-10-CM

## 2023-11-28 DIAGNOSIS — J20.9 ACUTE BRONCHITIS WITH SYMPTOMS > 10 DAYS: Primary | ICD-10-CM

## 2023-11-28 RX ORDER — CLOPIDOGREL BISULFATE 75 MG/1
75 TABLET ORAL DAILY
Qty: 90 TABLET | Refills: 3 | Status: SHIPPED | OUTPATIENT
Start: 2023-11-28

## 2023-11-28 RX ORDER — AZITHROMYCIN 250 MG/1
TABLET, FILM COATED ORAL
Qty: 6 TABLET | Refills: 0 | Status: SHIPPED | OUTPATIENT
Start: 2023-11-28

## 2023-11-28 NOTE — PROGRESS NOTES
Date of Encounter: 2023  Patient: Bernard Resendez,  1940    Subjective   History of Presenting Illness  Chief complaint: Cough    3 weeks of a productive cough that he cannot clear.  Often comes with clear or white sputum.  Usually worse at night, does improve throughout the day the more he is upright.  He has tried over-the-counter allergy medications without improving.  His wife did have a recent and similar illness.  No new environmental exposures.  Denies fever and chills.     Recent instructions to stop his hydrochlorothiazide due to concern for low blood pressure causing his shortness of breath, however he accidentally stopped the Plavix instead.     Patient has had about 7 days of left medial left knee pain.  No obvious injury.  Has not noticed significant instability.  Worsens with prolonged walking.  No locking or catching.  He does take Tylenol or occasionally Aleve for this.  In the past when he has had an injury like this usually gets better after about a week.  No documented history of knee osteoarthritis.    The following portions of the patient's history were reviewed and updated as appropriate: allergies, current medications, past family history, past medical history, past social history, past surgical history and problem list.    Patient Active Problem List   Diagnosis    Stage 3a chronic kidney disease    Hyperlipidemia    Hypogonadism in male    Insomnia d/t anxiety    Osteopenia    Hypertension    Colon polyps    Ulcerative colitis without complications    Obesity, Class I, BMI 30-34.9    DNR (do not resuscitate)    History of gout    Meningioma    Cerebrovascular accident (CVA) of pontine structure    Mild cognitive impairment    Hand cramps    Lung nodules    Chronic pain of left knee     Past Medical History:   Diagnosis Date    Acute CVA (cerebrovascular accident) 2022    Acute renal failure     Cancer     SKIN LESION REMOVED    Diverticulosis     Gout 2016    Hearing  difficulty     History of shingles     Hyperlipidemia     Hypertension     Hypogonadism in male     Insomnia     Meningioma 01/27/2022    Nephrolithiasis     Osteopenia     Osteopenia     Pruritus 01/15/2021    Renal insufficiency     Ulcerative colitis     Venous stasis dermatitis of both lower extremities 01/15/2021     Past Surgical History:   Procedure Laterality Date    CIRCUMCISION N/A 6/16/2021    Procedure: CIRCUMCISION;  Surgeon: Milton Forrest MD;  [place] [facility];  Service: Urology;  Laterality: N/A;    COLONOSCOPY  06/11/2015    NBIH, actuve colitis w/cryptitis, rare crypt abscess & superficial erosion, hyperplastic polyp    COLONOSCOPY N/A 8/8/2017    IH, nodular mucosa in the proximal rectum and in the distal rectum, Diverticulosis in the sigmoid colon, the descending colon and at the splenic flexure.  PATH: Hyperplastic polyps     COLONOSCOPY N/A 11/19/2019    Procedure: COLONOSCOPY to cecum with biopsies;  Surgeon: Calvin Gifford MD;  [place] [facility];  Service: Gastroenterology    EYE SURGERY      cataract    KIDNEY STONE SURGERY      TUMOR EXCISION      rectal tumor     Family History   Problem Relation Age of Onset    Hypertension Mother     Stroke Mother     Heart disease Mother     Asthma Sister     Ulcerative colitis Sister     Emphysema Sister     Lung disease Father         mesothelioma    Glaucoma Father     Diabetes Son     Malig Hyperthermia Neg Hx        Current Outpatient Medications:     amLODIPine (NORVASC) 10 MG tablet, TAKE ONE TABLET BY MOUTH DAILY, Disp: 90 tablet, Rfl: 3    atenolol (TENORMIN) 50 MG tablet, TAKE 1 TABLET BY MOUTH DAILY, Disp: 90 tablet, Rfl: 0    atorvastatin (LIPITOR) 20 MG tablet, TAKE THREE TABLETS BY MOUTH DAILY, Disp: 270 tablet, Rfl: 3    Cholecalciferol (VITAMIN D3) 1000 UNITS capsule, Take 1 capsule by mouth Daily., Disp: , Rfl:     clopidogrel (PLAVIX) 75 MG tablet, Take 1 tablet by mouth Daily., Disp: 90 tablet, Rfl: 3     Cyanocobalamin (Vitamin B12) 1000 MCG tablet controlled-release, Take 1,000 mcg by mouth Daily., Disp: , Rfl:     lisinopril (PRINIVIL,ZESTRIL) 40 MG tablet, TAKE ONE TABLET BY MOUTH DAILY, Disp: 90 tablet, Rfl: 3    Omega-3 Fatty Acids (FISH OIL) 1200 MG capsule capsule, Take 1 capsule by mouth Daily With Breakfast. HOLD PER MD INSTR, Disp: , Rfl:     sulfaSALAzine (AZULFIDINE) 500 MG tablet, TAKE TWO TABLETS BY MOUTH THREE TIMES A DAY, Disp: 180 tablet, Rfl: 11    vitamin C (ASCORBIC ACID) 500 MG tablet, Take 2 tablets by mouth Daily. HOLD PER MD INSTR, Disp: , Rfl:     zolpidem CR (AMBIEN CR) 6.25 MG CR tablet, TAKE ONE TABLET BY MOUTH ONCE NIGHTLY AS NEEDED FOR SLEEP, Disp: 90 tablet, Rfl: 1    albuterol sulfate  (90 Base) MCG/ACT inhaler, Inhale 2 puffs Every 4 (Four) Hours As Needed for Wheezing. (Patient not taking: Reported on 2023), Disp: 6.7 g, Rfl: 3    azithromycin (Zithromax) 250 MG tablet, Take 2 tablets the first day, then 1 tablet daily for 4 days., Disp: 6 tablet, Rfl: 0    Current Facility-Administered Medications:     albuterol (PROVENTIL) nebulizer solution 0.5% 2.5 mg/0.5mL, 2.5 mg, Nebulization, Once, Ector Sykes MD  Allergies   Allergen Reactions    Ciprofloxacin Rash     Pt unsure reaction     Social History     Tobacco Use    Smoking status: Former     Packs/day: 0.25     Years: 20.00     Additional pack years: 0.00     Total pack years: 5.00     Types: Cigarettes     Quit date:      Years since quittin.9    Smokeless tobacco: Former   Vaping Use    Vaping Use: Never used   Substance Use Topics    Alcohol use: Yes     Alcohol/week: 2.0 standard drinks of alcohol     Types: 2 Shots of liquor per week     Comment: socially    Drug use: No          Objective   Physical Exam  Vitals:    23 1307   BP: 130/72   BP Location: Left arm   Patient Position: Sitting   Cuff Size: Large Adult   Pulse: 72   Temp: 97.7 °F (36.5 °C)   TempSrc: Infrared   SpO2: 96%   Weight: 118  "kg (260 lb 6.4 oz)   Height: 180.3 cm (70.98\")     Body mass index is 36.33 kg/m².    Constitutional: NAD.  Eyes: EOMI. PERRLA. Normal conjunctiva.  Ear, nose, mouth, throat: No tonsillar exudates or erythema.   Normal nasal mucosa. Normal external ear canals and TMs bilaterally.  Cardiovascular: RRR. No murmurs. No LE edema b/l. Radial pulses 2+ bilaterally.  Pulmonary: CTA b/l.  With cough there is wheezing in bilateral coarseness.  Good effort.  Integumentary: No rashes or wounds on face or upper extremities.  Lymphatic: No anterior cervical lymphadenopathy.  Endocrine: No thyromegaly or palpable thyroid nodules.  Psychiatric: Normal affect. Normal thought content.  Musculoskeletal: Tenderness to palpation of the medial aspect of the left knee.  There is a palpable Baker's cyst.  Mild joint effusion most palpable on the lateral aspect.  Normal anterior and posterior drawer test, medial and lateral collateral ligament testing.  Normal Valorie.  Range of motion reduced by about 10 degrees in full extension.     Assessment & Plan   Assessment and Plan  Pleasant 83-year-old male with hypertension, hyperlipidemia, ulcerative colitis, CKD 3A, history of CVA, meningioma, osteopenia, history of gout, insomnia, among other problems, who presents with the following:      Diagnoses and all orders for this visit:    1. Acute bronchitis with symptoms > 10 days (Primary): Mild bronchitis on exam, no focal consolidation.  Recommend course of azithromycin for symptomatic relief.  Discussed reasons to return for further evaluation.  -     azithromycin (Zithromax) 250 MG tablet; Take 2 tablets the first day, then 1 tablet daily for 4 days.  Dispense: 6 tablet; Refill: 0    2. Chronic pain of left knee: Suspect flare of knee osteoarthritis based on exam, age and symptoms.  Discussed options including corticosteroid injection and x-ray evaluation.  He would like to give this about another week to 2 to see if symptoms resolve " without these interventions.  Recommend appointment for injection if not improving.    3. Chronic arterial ischemic stroke: Restart clopidogrel, stop hydrochlorothiazide due to concern for orthostasis and shortness of breath with low blood pressure at last office visit.  -     clopidogrel (PLAVIX) 75 MG tablet; Take 1 tablet by mouth Daily.  Dispense: 90 tablet; Refill: 3    Ector Sykes MD  [facility]  O: [phone]    Disclaimer: Parts of this note were dictated by speech recognition. Minor errors in transcription may be present. Please call if questions.

## 2023-12-13 ENCOUNTER — HOSPITAL ENCOUNTER (OUTPATIENT)
Dept: CT IMAGING | Facility: HOSPITAL | Age: 83
Discharge: HOME | End: 2023-12-13
Payer: MEDICARE

## 2023-12-13 DIAGNOSIS — D32.9 MENINGIOMA: ICD-10-CM

## 2023-12-13 DIAGNOSIS — R91.8 LUNG NODULES: ICD-10-CM

## 2023-12-13 LAB — CREAT BLDA-MCNC: 1.4 MG/DL (ref 0.6–1.3)

## 2023-12-13 PROCEDURE — 71250 CT THORAX DX C-: CPT

## 2023-12-13 PROCEDURE — 25510000001 IOPAMIDOL PER 1 ML: Performed by: NEUROLOGICAL SURGERY

## 2023-12-13 PROCEDURE — 70470 CT HEAD/BRAIN W/O & W/DYE: CPT

## 2023-12-13 PROCEDURE — 82565 ASSAY OF CREATININE: CPT

## 2023-12-13 RX ADMIN — IOPAMIDOL 50 ML: 755 INJECTION, SOLUTION INTRAVENOUS at 13:17

## 2023-12-14 ENCOUNTER — OFFICE VISIT (OUTPATIENT)
Dept: ORTHOPEDIC SURGERY | Facility: CLINIC | Age: 83
End: 2023-12-14
Payer: MEDICARE

## 2023-12-14 VITALS — WEIGHT: 252.9 LBS | HEIGHT: 71 IN | BODY MASS INDEX: 35.4 KG/M2 | TEMPERATURE: 98 F

## 2023-12-14 DIAGNOSIS — M17.12 ARTHRITIS OF KNEE, LEFT: ICD-10-CM

## 2023-12-14 DIAGNOSIS — M25.562 LEFT KNEE PAIN, UNSPECIFIED CHRONICITY: Primary | ICD-10-CM

## 2023-12-14 RX ORDER — METHYLPREDNISOLONE ACETATE 80 MG/ML
80 INJECTION, SUSPENSION INTRA-ARTICULAR; INTRALESIONAL; INTRAMUSCULAR; SOFT TISSUE
Status: COMPLETED | OUTPATIENT
Start: 2023-12-14 | End: 2023-12-14

## 2023-12-14 RX ORDER — LIDOCAINE HYDROCHLORIDE 10 MG/ML
2 INJECTION, SOLUTION EPIDURAL; INFILTRATION; INTRACAUDAL; PERINEURAL
Status: COMPLETED | OUTPATIENT
Start: 2023-12-14 | End: 2023-12-14

## 2023-12-14 RX ADMIN — METHYLPREDNISOLONE ACETATE 80 MG: 80 INJECTION, SUSPENSION INTRA-ARTICULAR; INTRALESIONAL; INTRAMUSCULAR; SOFT TISSUE at 11:10

## 2023-12-14 RX ADMIN — LIDOCAINE HYDROCHLORIDE 2 ML: 10 INJECTION, SOLUTION EPIDURAL; INFILTRATION; INTRACAUDAL; PERINEURAL at 11:10

## 2023-12-14 NOTE — PROGRESS NOTES
Patient Name: Bernard Resendez   YOB: 1940  Referring Primary Care Physician: Ector Sykes MD  BMI: Body mass index is 35.27 kg/m².    Chief Complaint:    Chief Complaint   Patient presents with    Left Knee - Initial Evaluation, Pain        HPI:     Bernard Resendez is a 83 y.o. male who presents today for evaluation of   Chief Complaint   Patient presents with    Left Knee - Initial Evaluation, Pain   .  Bernard is seen today with about a 3-week history of knee pain on the left does not really recall an incident to it he may have twisted it etc.  Is feeling stiff and swollen and especially when he first gets up to walk around.  It does not really lock or catch.      Subjective   Medications:   Home Medications:  Current Outpatient Medications on File Prior to Visit   Medication Sig    albuterol sulfate  (90 Base) MCG/ACT inhaler Inhale 2 puffs Every 4 (Four) Hours As Needed for Wheezing.    amLODIPine (NORVASC) 10 MG tablet TAKE ONE TABLET BY MOUTH DAILY    atenolol (TENORMIN) 50 MG tablet TAKE 1 TABLET BY MOUTH DAILY    atorvastatin (LIPITOR) 20 MG tablet TAKE THREE TABLETS BY MOUTH DAILY    Cholecalciferol (VITAMIN D3) 1000 UNITS capsule Take 1 capsule by mouth Daily.    clopidogrel (PLAVIX) 75 MG tablet Take 1 tablet by mouth Daily.    Cyanocobalamin (Vitamin B12) 1000 MCG tablet controlled-release Take 1,000 mcg by mouth Daily.    lisinopril (PRINIVIL,ZESTRIL) 40 MG tablet TAKE ONE TABLET BY MOUTH DAILY    Omega-3 Fatty Acids (FISH OIL) 1200 MG capsule capsule Take 1 capsule by mouth Daily With Breakfast. HOLD PER MD INSTR    sulfaSALAzine (AZULFIDINE) 500 MG tablet TAKE TWO TABLETS BY MOUTH THREE TIMES A DAY    vitamin C (ASCORBIC ACID) 500 MG tablet Take 2 tablets by mouth Daily. HOLD PER MD INSTR    zolpidem CR (AMBIEN CR) 6.25 MG CR tablet TAKE ONE TABLET BY MOUTH ONCE NIGHTLY AS NEEDED FOR SLEEP    azithromycin (Zithromax) 250 MG tablet Take 2 tablets the first day, then 1 tablet daily for 4  days. (Patient not taking: Reported on 12/14/2023)     Current Facility-Administered Medications on File Prior to Visit   Medication    albuterol (PROVENTIL) nebulizer solution 0.5% 2.5 mg/0.5mL    [COMPLETED] iopamidol (ISOVUE-370) 76 % injection 50 mL     Current Medications:  Scheduled Meds:albuterol, 2.5 mg, Nebulization, Once      Continuous Infusions:   PRN Meds:.    I have reviewed the patient's medical history in detail and updated the computerized patient record.  Review and summarization of old records includes:    Past Medical History:   Diagnosis Date    Acute CVA (cerebrovascular accident) 01/27/2022    Acute renal failure     Cancer     SKIN LESION REMOVED    Diverticulosis     Gout 02/13/2016    Hearing difficulty     History of shingles     Hyperlipidemia     Hypertension     Hypogonadism in male     Insomnia     Meningioma 01/27/2022    Nephrolithiasis     Obesity, Class I, BMI 30-34.9 03/30/2021    Osteopenia     Osteopenia     Pruritus 01/15/2021    Renal insufficiency     Ulcerative colitis     Venous stasis dermatitis of both lower extremities 01/15/2021        Past Surgical History:   Procedure Laterality Date    CIRCUMCISION N/A 6/16/2021    Procedure: CIRCUMCISION;  Surgeon: Milton Forrest MD;  Location: [facility] OR;  Service: Urology;  Laterality: N/A;    COLONOSCOPY  06/11/2015    NBIH, actuve colitis w/cryptitis, rare crypt abscess & superficial erosion, hyperplastic polyp    COLONOSCOPY N/A 8/8/2017    IH, nodular mucosa in the proximal rectum and in the distal rectum, Diverticulosis in the sigmoid colon, the descending colon and at the splenic flexure.  PATH: Hyperplastic polyps     COLONOSCOPY N/A 11/19/2019    Procedure: COLONOSCOPY to cecum with biopsies;  Surgeon: Calvin Gifford MD;  Location: [facility] ENDOSCOPY;  Service: Gastroenterology    EYE SURGERY      cataract    KIDNEY STONE SURGERY      TUMOR EXCISION      rectal tumor        Social History     Occupational History     Occupation: Retired   Tobacco Use    Smoking status: Former     Packs/day: 0.25     Years: 20.00     Additional pack years: 0.00     Total pack years: 5.00     Types: Cigarettes     Quit date:      Years since quittin.9     Passive exposure: Past    Smokeless tobacco: Former   Vaping Use    Vaping Use: Never used   Substance and Sexual Activity    Alcohol use: Yes     Alcohol/week: 2.0 standard drinks of alcohol     Types: 2 Shots of liquor per week     Comment: socially    Drug use: No    Sexual activity: Defer      Social History     Social History Narrative    Not on file        Family History   Problem Relation Age of Onset    Hypertension Mother     Stroke Mother     Heart disease Mother     Asthma Sister     Ulcerative colitis Sister     Emphysema Sister     Lung disease Father         mesothelioma    Glaucoma Father     Diabetes Son     Malig Hyperthermia Neg Hx        ROS: 14 point review of systems was performed and all other systems were reviewed and are negative except for documented findings in HPI and today's encounter.     Allergies:   Allergies   Allergen Reactions    Ciprofloxacin Rash     Pt unsure reaction     Constitutional:  Denies fever, shaking or chills   Eyes:  Denies change in visual acuity   HENT:  Denies nasal congestion or sore throat   Respiratory:  Denies cough or shortness of breath   Cardiovascular:  Denies chest pain or severe LE edema   GI:  Denies abdominal pain, nausea, vomiting, bloody stools or diarrhea   Musculoskeletal:  Numbness, tingling, pain, or loss of motor function only as noted above in history of present illness.  : Denies painful urination or hematuria  Integument:  Denies rash, lesion or ulceration   Neurologic:  Denies headache or focal weakness  Endocrine:  Denies lymphadenopathy  Psych:  Denies confusion or change in mental status   Hem:  Denies active bleeding    OBJECTIVE:  Physical Exam: 83 y.o. male  Wt Readings from Last 3 Encounters:  "  12/14/23 115 kg (252 lb 14.4 oz)   11/28/23 118 kg (260 lb 6.4 oz)   11/02/23 115 kg (254 lb 3.2 oz)     Ht Readings from Last 1 Encounters:   12/14/23 180.3 cm (71\")     Body mass index is 35.27 kg/m².  Vitals:    12/14/23 1050   Temp: 98 °F (36.7 °C)     Vital signs reviewed.     General Appearance:    Alert, cooperative, in no acute distress                  Eyes: conjunctiva clear  ENT: external ears and nose atraumatic  CV: no peripheral edema  Resp: normal respiratory effort  Skin: no rashes or wounds; normal turgor  Psych: mood and affect appropriate  Lymph: no nodes appreciated  Neuro: gross sensation intact  Vascular:  Palpable peripheral pulse in noted extremity  Musculoskeletal Extremities: Exam today is able to transfer and walk in the office he is a little stiff for the first few steps.  He does have some swelling and effusion which medial joint line tenderness little stiffness through range of motion little stiffness in the hip but it does not contribute to his pain picture and his ligaments feel stable.  He has chronic type swelling in both ankles distally and he says he has discussed it with his primary care before.  X-rays AP lateral 40 degree PA x-ray left knee was taken the office today for complaints of pain without comparison views that shows arthritis     Radiology:   See above        Assessment:     ICD-10-CM ICD-9-CM   1. Left knee pain, unspecified chronicity  M25.562 719.46   2. Arthritis of knee, left  M17.12 716.96        MDM/Plan:   The diagnosis(es), natural history, pathophysiology and treatment for diagnosis(es) were discussed. Opportunity given and questions answered.  Biomechanics of pertinent body areas discussed.  When appropriate, the use of ambulatory aids discussed.    Biomechanics of pertinent body areas discussed.  When appropriate, the use of ambulatory aids discussed.  Inflammation/pain control; with cold, heat, elevation and/or liniments discussed as " appropriate  Cortisone Injection. See procedure note.  MEDICAL RECORDS reviewed from other provider(s) for past and current medical history pertinent to this complaint.      12/14/2023    Dictated utilizing Dragon dictation        Large Joint Arthrocentesis: L knee  Date/Time: 12/14/2023 11:10 AM  Consent given by: patient  Site marked: site marked  Timeout: Immediately prior to procedure a time out was called to verify the correct patient, procedure, equipment, support staff and site/side marked as required   Supporting Documentation  Indications: pain   Procedure Details  Location: knee - L knee  Preparation: Patient was prepped and draped in the usual sterile fashion  Needle gauge: 21g.  Approach: anterolateral  Medications administered: 80 mg methylPREDNISolone acetate 80 MG/ML; 2 mL lidocaine PF 1% 1 %  Patient tolerance: patient tolerated the procedure well with no immediate complications

## 2023-12-15 ENCOUNTER — TELEPHONE (OUTPATIENT)
Dept: NEUROSURGERY | Facility: CLINIC | Age: 83
End: 2023-12-15
Payer: MEDICARE

## 2023-12-15 NOTE — TELEPHONE ENCOUNTER
LM FOR PATIENT TO CALL OFFICE AND ASK TO SPEAK W/ME TO SCHEDULE APPT FOR CT RESULTS    ----- Message from Marc Saenz MD sent at 12/15/2023  6:50 AM EST -----  This patient needs a follow-up appointment after their imaging study.

## 2023-12-18 ENCOUNTER — CLINICAL SUPPORT (OUTPATIENT)
Dept: NEUROSURGERY | Facility: CLINIC | Age: 83
End: 2023-12-18
Payer: MEDICARE

## 2023-12-18 ENCOUNTER — TELEPHONE (OUTPATIENT)
Dept: NEUROSURGERY | Facility: CLINIC | Age: 83
End: 2023-12-18
Payer: MEDICARE

## 2023-12-18 DIAGNOSIS — D32.9 MENINGIOMA: Primary | ICD-10-CM

## 2023-12-18 DIAGNOSIS — R91.8 PULMONARY NODULES: Primary | ICD-10-CM

## 2023-12-18 PROCEDURE — 99441 PR PHYS/QHP TELEPHONE EVALUATION 5-10 MIN: CPT | Performed by: NEUROLOGICAL SURGERY

## 2023-12-18 NOTE — PROGRESS NOTES
Subjective   Patient ID: Bernard Resendez is a 83 y.o. male is here today via telephone for follow-up with a new CT Head done on 12/13/2023.    You have chosen to receive care through a telephone visit. Do you consent to use a telephone visit for your medical care today? Yes     We had a video visit today.  The patient was at home and I was in the office.  We talked for 5 minutes.    History of Present Illness    I talked to this patient today.  He feels fine.  He really has no particular complaints at all.    The following portions of the patient's history were reviewed and updated as appropriate: allergies, current medications, past family history, past medical history, past social history, past surgical history, and problem list.    Review of Systems    I reviewed the review of systems listed by the patient and discussed by my MA    Objective       Tobacco Use: Medium Risk (12/14/2023)    Patient History     Smoking Tobacco Use: Former     Smokeless Tobacco Use: Former     Passive Exposure: Past          Physical Exam  Neurological:      Mental Status: He is alert and oriented to person, place, and time.       Neurologic Exam     Mental Status   Oriented to person, place, and time.           Assessment & Plan   Independent Review of Radiographic Studies:      I personally reviewed the images from the following studies.    I reviewed his CT which was done on 13 December.  This shows no change in the size of a 2 cm meningioma.  This is unchanged for about a year.    Medical Decision Making:      I told the patient that from my point of view I would not recommend doing anything further at this point.  He is 83 years old.  This tumor is partially calcified which means it is very slow-growing if at all and I think it is unlikely to ever cause him problems.  He is to call if any problems develop but otherwise we will hold off on further scans.    Diagnoses and all orders for this visit:    1. Meningioma  (Primary)      Return if symptoms worsen or fail to improve.

## 2023-12-19 NOTE — PROGRESS NOTES
There are some small lung nodules in your chest but overall nothing that looks concerning, but because of the number and a few changes in appearance we recommend rechecking in about 6 months.  I will arrange this.

## 2023-12-21 ENCOUNTER — TRANSCRIBE ORDERS (OUTPATIENT)
Dept: ADMINISTRATIVE | Facility: HOSPITAL | Age: 83
End: 2023-12-21
Payer: MEDICARE

## 2023-12-21 DIAGNOSIS — R91.1 PULMONARY NODULE: Primary | ICD-10-CM

## 2023-12-22 ENCOUNTER — OFFICE VISIT (OUTPATIENT)
Dept: INTERNAL MEDICINE | Facility: CLINIC | Age: 83
End: 2023-12-22
Payer: MEDICARE

## 2023-12-22 ENCOUNTER — TELEPHONE (OUTPATIENT)
Dept: INTERNAL MEDICINE | Facility: CLINIC | Age: 83
End: 2023-12-22
Payer: MEDICARE

## 2023-12-22 VITALS — OXYGEN SATURATION: 96 % | TEMPERATURE: 97.7 F | HEART RATE: 51 BPM

## 2023-12-22 DIAGNOSIS — J20.9 ACUTE BRONCHITIS WITH SYMPTOMS > 10 DAYS: Primary | ICD-10-CM

## 2023-12-22 DIAGNOSIS — R19.7 DIARRHEA, UNSPECIFIED TYPE: ICD-10-CM

## 2023-12-22 PROCEDURE — 99213 OFFICE O/P EST LOW 20 MIN: CPT | Performed by: NURSE PRACTITIONER

## 2023-12-22 PROCEDURE — 1160F RVW MEDS BY RX/DR IN RCRD: CPT | Performed by: NURSE PRACTITIONER

## 2023-12-22 PROCEDURE — 1159F MED LIST DOCD IN RCRD: CPT | Performed by: NURSE PRACTITIONER

## 2023-12-22 RX ORDER — AMOXICILLIN AND CLAVULANATE POTASSIUM 875; 125 MG/1; MG/1
1 TABLET, FILM COATED ORAL 2 TIMES DAILY
Qty: 14 TABLET | Refills: 0 | Status: SHIPPED | OUTPATIENT
Start: 2023-12-22 | End: 2023-12-29

## 2023-12-22 NOTE — PROGRESS NOTES
"Chief Complaint  Bronchitis (C/o ongoing/worsening chest congestion x > 1 month; pt was Rx'd zithromax, no improvement; also c/o diarrhea x 1 day)    Subjective        Bernard Resendez presents to [facility]  History of Present Illness  Here with complaint of chronic cough and 1-day history of diarrhea.    He is wheezing today, has not been using inhaler. Per patient, he is on his third day of azithromycin 250 mg, part of a Z-Kyler.  He saw pulmonology yesterday, and was prescribed a new inhaler.  However, he does not know the name of the inhaler and has not picked it up from the pharmacy as of yet.      He does not report worsening with his cough or any other new, worsening upper respiratory symptoms.     Diarrhea: He has not taken any over-the-counter medication as of yet.  Denies bright red blood per rectum, melena.  Does not appear acutely ill today.      Objective   Vital Signs:  Pulse 51   Temp 97.7 °F (36.5 °C) (Infrared)   SpO2 96%   Estimated body mass index is 35.27 kg/m² as calculated from the following:    Height as of 12/14/23: 180.3 cm (71\").    Weight as of 12/14/23: 115 kg (252 lb 14.4 oz).       Class 2 Severe Obesity (BMI >=35 and <=39.9). Not discussed today.     Physical Exam  Vitals reviewed.   Constitutional:       General: He is not in acute distress.     Appearance: Normal appearance. He is normal weight. He is obese. He is not ill-appearing, toxic-appearing or diaphoretic.   Cardiovascular:      Rate and Rhythm: Normal rate and regular rhythm.      Pulses: Normal pulses.      Heart sounds: Normal heart sounds.   Pulmonary:      Effort: Pulmonary effort is normal.      Breath sounds: Examination of the right-lower field reveals rales. Examination of the left-lower field reveals rales. Rales (very mild, expiration) present. No wheezing or rhonchi.   Neurological:      General: No focal deficit present.      Mental Status: He is alert and oriented to person, place, " and time. Mental status is at baseline.   Psychiatric:         Mood and Affect: Mood normal.         Behavior: Behavior normal.         Thought Content: Thought content normal.         Judgment: Judgment normal.        Result Review :                   Assessment and Plan   Patient is a pleasant 83 year-old male with multiple co-morbidities including lung nodules here with chronic cough and diarrhea x 1.         Diagnoses and all orders for this visit:    1. Acute bronchitis with symptoms > 10 days (Primary)  Comments:  Begin inhaler prescribed to you by pulmonologist.  Orders:  -     amoxicillin-clavulanate (AUGMENTIN) 875-125 MG per tablet; Take 1 tablet by mouth 2 (Two) Times a Day for 7 days.  Dispense: 14 tablet; Refill: 0    2. Diarrhea, unspecified type  Comments:  Take OTC anti-diarrhea medication and extra soluable fiber. If not improvement, return to office in 7 days.             Follow Up   No follow-ups on file.  Patient was given instructions and counseling regarding his condition or for health maintenance advice. Please see specific information pulled into the AVS if appropriate.

## 2023-12-22 NOTE — TELEPHONE ENCOUNTER
Caller: Bernard Resendez    Relationship: Self    Best call back number: [phone]     What medication are you requesting: STRONG MEDICATION    What are your current symptoms:  CHEST CONGESTION,     How long have you been experiencing symptoms:      Have you had these symptoms before:    [] Yes  [] No    Have you been treated for these symptoms before:   [] Yes  [] No    If a prescription is needed, what is your preferred pharmacy and phone number: [facility] [id number] [place], [place] - [address] [facility] AT [facility] & [address] - [phone] [facility] [phone] FX     Additional notes: PATIENT CALLED HE WAS LAST SEEN ON 12/18/23 AND PRESCRIBED   azithromycin (Zithromax) 250 MG tablet  BUT HE STATES THAT IT IS NOT WORKING AND NEEDS SOMETHING STRONGER.  HIS CHEST CONGESTION HAS GOTTEN WORSE.  CAN DR CAMPBELL CALL SOMETHING ELSE IN FOR THE PATIENT.

## 2023-12-27 ENCOUNTER — TELEPHONE (OUTPATIENT)
Dept: NEUROLOGY | Facility: CLINIC | Age: 83
End: 2023-12-27
Payer: MEDICARE

## 2024-02-19 ENCOUNTER — TELEPHONE (OUTPATIENT)
Dept: INTERNAL MEDICINE | Facility: CLINIC | Age: 84
End: 2024-02-19
Payer: MEDICARE

## 2024-02-19 NOTE — TELEPHONE ENCOUNTER
Caller: Bernard Resendez    Relationship to patient: Self    Best call back number: [phone]     Patient is needing: CALLING TO SEE IF DR CAMPBELL ORDERED HOME OXYGEN FOR HIM. HE STATES HE DID NOT ORDER IT AND IS BEING BILLED, JUST TRYING TO FIND OUT WHICH DOCTOR DID ORDER IT, PLEASE CALL AND ADVISE.

## 2024-02-21 ENCOUNTER — TELEPHONE (OUTPATIENT)
Dept: INTERNAL MEDICINE | Facility: CLINIC | Age: 84
End: 2024-02-21
Payer: MEDICARE

## 2024-02-21 DIAGNOSIS — U07.1 COVID-19 VIRUS INFECTION: Primary | ICD-10-CM

## 2024-02-21 NOTE — TELEPHONE ENCOUNTER
Sent in paxlovid  Needs to hold statin  Plavix will be less effective on it but risk of covid complications higher than CVA from subtherapeutic plavix for 5 days

## 2024-02-21 NOTE — TELEPHONE ENCOUNTER
Caller: Bernard Resendez    Relationship to patient: Self    Best call back number: [phone]     Date of positive COVID19 test: 2/19    COVID19 symptoms: SORE THROAT, CONGESTION, COUGH    Additional information or concerns: PATIENT WENT TO URGENT CARE ON MONDAY AND TESTED POSITIVE FOR COVID. WOULD DR CAMPBELL STILL LIKE TO SEE HIM ON FRIDAY? PLEASE ADVISE.

## 2024-02-26 DIAGNOSIS — I10 ESSENTIAL HYPERTENSION: ICD-10-CM

## 2024-02-26 RX ORDER — ATENOLOL 50 MG/1
50 TABLET ORAL DAILY
Qty: 90 TABLET | Refills: 0 | Status: SHIPPED | OUTPATIENT
Start: 2024-02-26

## 2024-03-13 DIAGNOSIS — G47.00 INSOMNIA, UNSPECIFIED TYPE: ICD-10-CM

## 2024-03-13 NOTE — TELEPHONE ENCOUNTER
Rx Refill Note  Requested Prescriptions     Pending Prescriptions Disp Refills    zolpidem CR (AMBIEN CR) 6.25 MG CR tablet [Pharmacy Med Name: ZOLPIDEM TART ER 6.25 MG TAB] 90 tablet      Sig: TAKE 1 TABLET BY MOUTH ONCE NIGHTLY AS NEEDED FOR SLEEP      Last office visit with prescribing clinician: 11/28/2023   Last telemedicine visit with prescribing clinician: Visit date not found   Next office visit with prescribing clinician: Visit date not found     No Current UDS

## 2024-03-14 RX ORDER — ZOLPIDEM TARTRATE 6.25 MG/1
6.25 TABLET, FILM COATED, EXTENDED RELEASE ORAL NIGHTLY PRN
Qty: 90 TABLET | Refills: 0 | Status: SHIPPED | OUTPATIENT
Start: 2024-03-25

## 2024-03-19 ENCOUNTER — TELEPHONE (OUTPATIENT)
Dept: NEUROLOGY | Facility: CLINIC | Age: 84
End: 2024-03-19
Payer: MEDICARE

## 2024-03-19 ENCOUNTER — TELEPHONE (OUTPATIENT)
Dept: INTERNAL MEDICINE | Facility: CLINIC | Age: 84
End: 2024-03-19
Payer: MEDICARE

## 2024-03-19 NOTE — TELEPHONE ENCOUNTER
Pharmacy Name: [facility], [place]. [facility] [address] [phone] [facility] [phone] FX     Reference Number (if applicable): [id number]    Pharmacy representative name: CHUNG    Pharmacy representative phone number: [phone]     What medication are you calling in regards to: atorvastatin (LIPITOR) 20 MG tablet     What question does the pharmacy have: PHARMACY CALLING STATING THAT THIS MEDICATION IS NEEDING A PRIOR AUTH DUE TO THE QUANTITY THE PATIENT TAKES     Who is the provider that prescribed the medication:     Additional notes: PLEASE ADVISE

## 2024-03-19 NOTE — TELEPHONE ENCOUNTER
"Received fax from [facility] re: Rx for atorvastatin.    Per insurance, \"....Atorvastatin in the amount of 90 per 30 days. We approved this request from 1/1/2024 through 3/19/2025.\"  Pleaser refer to fax under media.    HUB TO RELAY  "

## 2024-04-01 ENCOUNTER — OFFICE VISIT (OUTPATIENT)
Dept: INTERNAL MEDICINE | Facility: CLINIC | Age: 84
End: 2024-04-01
Payer: MEDICARE

## 2024-04-01 VITALS
SYSTOLIC BLOOD PRESSURE: 120 MMHG | HEIGHT: 71 IN | BODY MASS INDEX: 34.3 KG/M2 | WEIGHT: 245 LBS | RESPIRATION RATE: 16 BRPM | HEART RATE: 57 BPM | DIASTOLIC BLOOD PRESSURE: 60 MMHG | TEMPERATURE: 99.8 F | OXYGEN SATURATION: 92 %

## 2024-04-01 DIAGNOSIS — J02.9 SORE THROAT: Primary | ICD-10-CM

## 2024-04-01 DIAGNOSIS — R06.02 SHORTNESS OF BREATH: ICD-10-CM

## 2024-04-01 DIAGNOSIS — J40 BRONCHITIS: ICD-10-CM

## 2024-04-01 DIAGNOSIS — J18.9 PNEUMONIA OF BOTH UPPER LOBES DUE TO INFECTIOUS ORGANISM: ICD-10-CM

## 2024-04-01 DIAGNOSIS — R06.2 WHEEZING: ICD-10-CM

## 2024-04-01 LAB
EXPIRATION DATE: NORMAL
EXPIRATION DATE: NORMAL
FLUAV AG UPPER RESP QL IA.RAPID: NOT DETECTED
FLUBV AG UPPER RESP QL IA.RAPID: NOT DETECTED
INTERNAL CONTROL: NORMAL
INTERNAL CONTROL: NORMAL
Lab: NORMAL
Lab: NORMAL
S PYO AG THROAT QL: NEGATIVE
SARS-COV-2 AG UPPER RESP QL IA.RAPID: NOT DETECTED

## 2024-04-01 PROCEDURE — 87880 STREP A ASSAY W/OPTIC: CPT | Performed by: NURSE PRACTITIONER

## 2024-04-01 PROCEDURE — 3074F SYST BP LT 130 MM HG: CPT | Performed by: NURSE PRACTITIONER

## 2024-04-01 PROCEDURE — 3078F DIAST BP <80 MM HG: CPT | Performed by: NURSE PRACTITIONER

## 2024-04-01 PROCEDURE — 87428 SARSCOV & INF VIR A&B AG IA: CPT | Performed by: NURSE PRACTITIONER

## 2024-04-01 PROCEDURE — 1160F RVW MEDS BY RX/DR IN RCRD: CPT | Performed by: NURSE PRACTITIONER

## 2024-04-01 PROCEDURE — 1159F MED LIST DOCD IN RCRD: CPT | Performed by: NURSE PRACTITIONER

## 2024-04-01 PROCEDURE — 99213 OFFICE O/P EST LOW 20 MIN: CPT | Performed by: NURSE PRACTITIONER

## 2024-04-01 RX ORDER — AMOXICILLIN 500 MG/1
1000 CAPSULE ORAL 3 TIMES DAILY
Qty: 30 CAPSULE | Refills: 0 | Status: SHIPPED | OUTPATIENT
Start: 2024-04-01 | End: 2024-04-06

## 2024-04-01 RX ORDER — AZITHROMYCIN 250 MG/1
TABLET, FILM COATED ORAL
Qty: 6 TABLET | Refills: 0 | Status: SHIPPED | OUTPATIENT
Start: 2024-04-01

## 2024-04-01 RX ORDER — PREDNISONE 10 MG/1
TABLET ORAL
Qty: 28 TABLET | Refills: 0 | Status: SHIPPED | OUTPATIENT
Start: 2024-04-01 | End: 2024-04-12

## 2024-04-01 NOTE — PROGRESS NOTES
"Chief Complaint  Shortness of Breath (X 3- 4 days ago, pt not sure if around anyone ill. ), Sore Throat, Cough (Dry cough, is able to get phlegm up occasionally.), and Wheezing (Audible wheezing. )    Subjective        Bernard Resendez presents to [facility]  History of Present Illness  Here with 3 day history of wheezing, sore throat, and audile wheezing.     He wears oxygen at home. Follows. Dr. Makenna Lewis, pulmonology, for lung nodules. He has been using his hand-held in haler, but does  not state how often.       Objective   Vital Signs:  /60 (BP Location: Left arm, Patient Position: Sitting, Cuff Size: Large Adult)   Pulse 57   Temp 99.8 °F (37.7 °C) (Infrared)   Resp 16   Ht 179.1 cm (70.5\")   Wt 111 kg (245 lb)   SpO2 92% Comment: pt states that he is on home Oxygen  BMI 34.66 kg/m²   Estimated body mass index is 34.66 kg/m² as calculated from the following:    Height as of this encounter: 179.1 cm (70.5\").    Weight as of this encounter: 111 kg (245 lb).       BMI is >= 30 and <35. (Class 1 Obesity). The following options were offered after discussion;: not discussed at this time.       Physical Exam  Vitals reviewed.   Constitutional:       Appearance: Normal appearance. He is obese.   Cardiovascular:      Rate and Rhythm: Normal rate and regular rhythm.      Pulses: Normal pulses.      Heart sounds: Normal heart sounds.   Pulmonary:      Effort: Accessory muscle usage present.      Breath sounds: Examination of the right-upper field reveals wheezing. Examination of the left-upper field reveals wheezing. Examination of the right-middle field reveals wheezing. Examination of the left-middle field reveals wheezing. Examination of the right-lower field reveals wheezing. Examination of the left-lower field reveals wheezing. Wheezing present.   Skin:     General: Skin is warm.   Neurological:      General: No focal deficit present.      Mental Status: He is alert and " oriented to person, place, and time. Mental status is at baseline.   Psychiatric:         Mood and Affect: Mood normal.         Behavior: Behavior normal.         Thought Content: Thought content normal.         Judgment: Judgment normal.        Result Review :                   Assessment and Plan   Patient is a very pleasant 83 year old male with hyperlipidemia, hypertension, obesity, UC, stage 3a CKD, lung nodules, CVA here with wheezing and increased work with breathing.           Diagnoses and all orders for this visit:    1. Sore throat (Primary)  -     POCT SARS-CoV-2 Antigen FRANCISCO + Flu  -     POC Rapid Strep A    2. Shortness of breath  -     POCT SARS-CoV-2 Antigen FRANCISCO + Flu  -     predniSONE (DELTASONE) 10 MG tablet; Take 4 tablets by mouth Daily for 4 days, THEN 2 tablets Daily for 4 days, THEN 1 tablet Daily for 4 days.  Dispense: 28 tablet; Refill: 0    3. Wheezing  -     predniSONE (DELTASONE) 10 MG tablet; Take 4 tablets by mouth Daily for 4 days, THEN 2 tablets Daily for 4 days, THEN 1 tablet Daily for 4 days.  Dispense: 28 tablet; Refill: 0  -     albuterol (PROVENTIL) nebulizer solution 0.5% 2.5 mg/0.5mL    4. Bronchitis  -     albuterol (PROVENTIL) nebulizer solution 0.5% 2.5 mg/0.5mL    5. Pneumonia of both upper lobes due to infectious organism  -     amoxicillin (AMOXIL) 500 MG capsule; Take 2 capsules by mouth 3 (Three) Times a Day for 5 days.  Dispense: 30 capsule; Refill: 0  -     azithromycin (Zithromax Z-Kyler) 250 MG tablet; Take 2 tablets by mouth on day 1, then 1 tablet daily on days 2-5  Dispense: 6 tablet; Refill: 0             Follow Up   Return for Follow up with Dr. Sykes as needed..  Patient was given instructions and counseling regarding his condition or for health maintenance advice. Please see specific information pulled into the AVS if appropriate.

## 2024-04-02 ENCOUNTER — TELEPHONE (OUTPATIENT)
Dept: INTERNAL MEDICINE | Facility: CLINIC | Age: 84
End: 2024-04-02
Payer: MEDICARE

## 2024-04-02 NOTE — TELEPHONE ENCOUNTER
Caller: Bernard Resendez    Relationship: Self    Best call back number: [phone]    Which medication are you concerned about: AMOXICILLIN, AZITHROMYCIN, AND PREDNISONE     Who prescribed you this medication: TWYLA MORENO    When did you start taking this medication: 04/02/24    What are your concerns: PATIENT IS WANTING TO KNOW IF HE SHOULD TAKE ALL THREE OF THESE TOGETHER     HE SAID THE PHARMACIST SUGGESTED SINCE HE IS TAKING THESE THAT HE TAKE PROBIOTICS ALSO AND HE IS WANTING TO MAKE SURE THAT'S OK, ITS [employer] BRAND VEGETABLE CAPSULES     How long have you had these concerns: REQUESTED CALLBACK

## 2024-04-02 NOTE — TELEPHONE ENCOUNTER
Lvm for pt advising that he does need to take together, advised the [employer] brand veg capsule mentioned before is fine.   HUB TO READ

## 2024-04-08 ENCOUNTER — TELEPHONE (OUTPATIENT)
Dept: INTERNAL MEDICINE | Facility: CLINIC | Age: 84
End: 2024-04-08
Payer: MEDICARE

## 2024-04-08 DIAGNOSIS — I10 PRIMARY HYPERTENSION: ICD-10-CM

## 2024-04-08 NOTE — TELEPHONE ENCOUNTER
Caller: Bernard Resendez    Relationship: Self    Best call back number: [phone]    What is the best time to reach you: ANYTIME    Who are you requesting to speak with (clinical staff, provider,  specific staff member): CLINICAL    What was the call regarding: PATIENT STATED HE WAS PRESCRIBED 2 ANTIBIOTICS AND A STEROID WHEN HE SAW ANTELMO MORENO ON 4/1/24 AND WAS DIAGNOSED WITH PNEUMONIA. PATIENT STATED HE HAS FINISHED THE MEDICATIONS AND IS WANTING TO KNOW WHAT TO DO NEXT AS HE STILL IS COUGHING AND HAS PHLEGM. PLEASE ADVISE.    PREFERRED PHARMACY:[facility] [id number] - [place], [place] - [address] [address] [facility] [address] AT [facility] & [address] - [phone] [facility] [phone]  [phone]

## 2024-04-08 NOTE — TELEPHONE ENCOUNTER
Rx Refill Note  Requested Prescriptions     Pending Prescriptions Disp Refills    amLODIPine (NORVASC) 10 MG tablet [Pharmacy Med Name: AMLODIPINE BESYLATE 10 MG TAB] 90 tablet 3     Sig: TAKE ONE TABLET BY MOUTH DAILY      Last office visit with prescribing clinician: 11/28/2023   Last telemedicine visit with prescribing clinician: Visit date not found   Next office visit with prescribing clinician: Visit date not found     Lavern Talbot MA  04/08/24, 10:55 EDT

## 2024-04-09 RX ORDER — AMLODIPINE BESYLATE 10 MG/1
TABLET ORAL
Qty: 90 TABLET | Refills: 3 | Status: SHIPPED | OUTPATIENT
Start: 2024-04-09

## 2024-04-12 NOTE — TELEPHONE ENCOUNTER
Cough can sometimes be one of the last things to go after treatment, but should be improving with treatment  If the symptoms are not continuing to improve, or they are worsening, he needs to come in to be seen

## 2024-04-20 ENCOUNTER — APPOINTMENT (OUTPATIENT)
Dept: GENERAL RADIOLOGY | Facility: HOSPITAL | Age: 84
End: 2024-04-20
Payer: MEDICARE

## 2024-04-20 ENCOUNTER — APPOINTMENT (OUTPATIENT)
Dept: CARDIOLOGY | Facility: HOSPITAL | Age: 84
End: 2024-04-20
Payer: MEDICARE

## 2024-04-20 ENCOUNTER — HOSPITAL ENCOUNTER (EMERGENCY)
Facility: HOSPITAL | Age: 84
Discharge: HOME | End: 2024-04-20
Attending: EMERGENCY MEDICINE
Payer: MEDICARE

## 2024-04-20 VITALS
HEIGHT: 71 IN | WEIGHT: 244.71 LBS | TEMPERATURE: 99.1 F | RESPIRATION RATE: 18 BRPM | OXYGEN SATURATION: 95 % | DIASTOLIC BLOOD PRESSURE: 79 MMHG | BODY MASS INDEX: 34.26 KG/M2 | SYSTOLIC BLOOD PRESSURE: 144 MMHG | HEART RATE: 71 BPM

## 2024-04-20 DIAGNOSIS — R06.09 EXERTIONAL DYSPNEA: ICD-10-CM

## 2024-04-20 DIAGNOSIS — L03.115 CELLULITIS OF RIGHT LOWER EXTREMITY: Primary | ICD-10-CM

## 2024-04-20 DIAGNOSIS — R60.0 PEDAL EDEMA: ICD-10-CM

## 2024-04-20 LAB
ALBUMIN SERPL-MCNC: 3.8 G/DL (ref 3.5–5.2)
ALBUMIN/GLOB SERPL: 1.6 G/DL
ALP SERPL-CCNC: 78 U/L (ref 39–117)
ALT SERPL W P-5'-P-CCNC: 16 U/L (ref 1–41)
ANION GAP SERPL CALCULATED.3IONS-SCNC: 9 MMOL/L (ref 5–15)
AST SERPL-CCNC: 15 U/L (ref 1–40)
BASOPHILS # BLD AUTO: 0.03 10*3/MM3 (ref 0–0.2)
BASOPHILS NFR BLD AUTO: 0.3 % (ref 0–1.5)
BH CV LOWER VASCULAR LEFT COMMON FEMORAL AUGMENT: NORMAL
BH CV LOWER VASCULAR LEFT COMMON FEMORAL COMPETENT: NORMAL
BH CV LOWER VASCULAR LEFT COMMON FEMORAL COMPRESS: NORMAL
BH CV LOWER VASCULAR LEFT COMMON FEMORAL PHASIC: NORMAL
BH CV LOWER VASCULAR LEFT COMMON FEMORAL SPONT: NORMAL
BH CV LOWER VASCULAR RIGHT COMMON FEMORAL AUGMENT: NORMAL
BH CV LOWER VASCULAR RIGHT COMMON FEMORAL COMPETENT: NORMAL
BH CV LOWER VASCULAR RIGHT COMMON FEMORAL COMPRESS: NORMAL
BH CV LOWER VASCULAR RIGHT COMMON FEMORAL PHASIC: NORMAL
BH CV LOWER VASCULAR RIGHT COMMON FEMORAL SPONT: NORMAL
BH CV LOWER VASCULAR RIGHT DISTAL FEMORAL COMPRESS: NORMAL
BH CV LOWER VASCULAR RIGHT GASTRONEMIUS COMPRESS: NORMAL
BH CV LOWER VASCULAR RIGHT GREATER SAPH AK COMPRESS: NORMAL
BH CV LOWER VASCULAR RIGHT GREATER SAPH BK COMPRESS: NORMAL
BH CV LOWER VASCULAR RIGHT LESSER SAPH COMPRESS: NORMAL
BH CV LOWER VASCULAR RIGHT MID FEMORAL AUGMENT: NORMAL
BH CV LOWER VASCULAR RIGHT MID FEMORAL COMPETENT: NORMAL
BH CV LOWER VASCULAR RIGHT MID FEMORAL COMPRESS: NORMAL
BH CV LOWER VASCULAR RIGHT MID FEMORAL PHASIC: NORMAL
BH CV LOWER VASCULAR RIGHT MID FEMORAL SPONT: NORMAL
BH CV LOWER VASCULAR RIGHT PERONEAL COMPRESS: NORMAL
BH CV LOWER VASCULAR RIGHT POPLITEAL AUGMENT: NORMAL
BH CV LOWER VASCULAR RIGHT POPLITEAL COMPETENT: NORMAL
BH CV LOWER VASCULAR RIGHT POPLITEAL COMPRESS: NORMAL
BH CV LOWER VASCULAR RIGHT POPLITEAL PHASIC: NORMAL
BH CV LOWER VASCULAR RIGHT POPLITEAL SPONT: NORMAL
BH CV LOWER VASCULAR RIGHT POSTERIOR TIBIAL COMPRESS: NORMAL
BH CV LOWER VASCULAR RIGHT PROFUNDA FEMORAL COMPRESS: NORMAL
BH CV LOWER VASCULAR RIGHT PROXIMAL FEMORAL COMPRESS: NORMAL
BH CV LOWER VASCULAR RIGHT SAPHENOFEMORAL JUNCTION COMPRESS: NORMAL
BH CV VAS PRELIMINARY FINDINGS SCRIPTING: 1
BILIRUB SERPL-MCNC: 0.3 MG/DL (ref 0–1.2)
BUN SERPL-MCNC: 22 MG/DL (ref 8–23)
BUN/CREAT SERPL: 17.7 (ref 7–25)
CALCIUM SPEC-SCNC: 8.6 MG/DL (ref 8.6–10.5)
CHLORIDE SERPL-SCNC: 109 MMOL/L (ref 98–107)
CO2 SERPL-SCNC: 24 MMOL/L (ref 22–29)
CREAT SERPL-MCNC: 1.24 MG/DL (ref 0.76–1.27)
D-LACTATE SERPL-SCNC: 1.1 MMOL/L (ref 0.5–2)
DEPRECATED RDW RBC AUTO: 47.1 FL (ref 37–54)
EGFRCR SERPLBLD CKD-EPI 2021: 57 ML/MIN/1.73
EOSINOPHIL # BLD AUTO: 0.29 10*3/MM3 (ref 0–0.4)
EOSINOPHIL NFR BLD AUTO: 3.2 % (ref 0.3–6.2)
ERYTHROCYTE [DISTWIDTH] IN BLOOD BY AUTOMATED COUNT: 14 % (ref 12.3–15.4)
GLOBULIN UR ELPH-MCNC: 2.4 GM/DL
GLUCOSE SERPL-MCNC: 85 MG/DL (ref 65–99)
HCT VFR BLD AUTO: 38.1 % (ref 37.5–51)
HGB BLD-MCNC: 12.1 G/DL (ref 13–17.7)
HOLD SPECIMEN: NORMAL
IMM GRANULOCYTES # BLD AUTO: 0.04 10*3/MM3 (ref 0–0.05)
IMM GRANULOCYTES NFR BLD AUTO: 0.4 % (ref 0–0.5)
LYMPHOCYTES # BLD AUTO: 1.41 10*3/MM3 (ref 0.7–3.1)
LYMPHOCYTES NFR BLD AUTO: 15.7 % (ref 19.6–45.3)
MCH RBC QN AUTO: 28.9 PG (ref 26.6–33)
MCHC RBC AUTO-ENTMCNC: 31.8 G/DL (ref 31.5–35.7)
MCV RBC AUTO: 90.9 FL (ref 79–97)
MONOCYTES # BLD AUTO: 0.79 10*3/MM3 (ref 0.1–0.9)
MONOCYTES NFR BLD AUTO: 8.8 % (ref 5–12)
NEUTROPHILS NFR BLD AUTO: 6.44 10*3/MM3 (ref 1.7–7)
NEUTROPHILS NFR BLD AUTO: 71.6 % (ref 42.7–76)
NRBC BLD AUTO-RTO: 0 /100 WBC (ref 0–0.2)
NT-PROBNP SERPL-MCNC: 138 PG/ML (ref 0–1800)
PLATELET # BLD AUTO: 204 10*3/MM3 (ref 140–450)
PMV BLD AUTO: 9.7 FL (ref 6–12)
POTASSIUM SERPL-SCNC: 4.5 MMOL/L (ref 3.5–5.2)
PROT SERPL-MCNC: 6.2 G/DL (ref 6–8.5)
QT INTERVAL: 379 MS
QTC INTERVAL: 427 MS
RBC # BLD AUTO: 4.19 10*6/MM3 (ref 4.14–5.8)
SODIUM SERPL-SCNC: 142 MMOL/L (ref 136–145)
TROPONIN T SERPL HS-MCNC: 26 NG/L
WBC NRBC COR # BLD AUTO: 9 10*3/MM3 (ref 3.4–10.8)
WHOLE BLOOD HOLD COAG: NORMAL

## 2024-04-20 PROCEDURE — 93010 ELECTROCARDIOGRAM REPORT: CPT | Performed by: INTERNAL MEDICINE

## 2024-04-20 PROCEDURE — 99284 EMERGENCY DEPT VISIT MOD MDM: CPT

## 2024-04-20 PROCEDURE — 71045 X-RAY EXAM CHEST 1 VIEW: CPT

## 2024-04-20 PROCEDURE — 83605 ASSAY OF LACTIC ACID: CPT | Performed by: EMERGENCY MEDICINE

## 2024-04-20 PROCEDURE — 84484 ASSAY OF TROPONIN QUANT: CPT | Performed by: EMERGENCY MEDICINE

## 2024-04-20 PROCEDURE — 87040 BLOOD CULTURE FOR BACTERIA: CPT | Performed by: EMERGENCY MEDICINE

## 2024-04-20 PROCEDURE — 36415 COLL VENOUS BLD VENIPUNCTURE: CPT

## 2024-04-20 PROCEDURE — 93971 EXTREMITY STUDY: CPT

## 2024-04-20 PROCEDURE — 93005 ELECTROCARDIOGRAM TRACING: CPT | Performed by: EMERGENCY MEDICINE

## 2024-04-20 PROCEDURE — 93971 EXTREMITY STUDY: CPT | Performed by: SURGERY

## 2024-04-20 PROCEDURE — 83880 ASSAY OF NATRIURETIC PEPTIDE: CPT | Performed by: EMERGENCY MEDICINE

## 2024-04-20 PROCEDURE — 80053 COMPREHEN METABOLIC PANEL: CPT | Performed by: EMERGENCY MEDICINE

## 2024-04-20 PROCEDURE — 85025 COMPLETE CBC W/AUTO DIFF WBC: CPT | Performed by: EMERGENCY MEDICINE

## 2024-04-20 RX ORDER — FUROSEMIDE 20 MG/1
20 TABLET ORAL DAILY
Qty: 5 TABLET | Refills: 0 | Status: SHIPPED | OUTPATIENT
Start: 2024-04-20 | End: 2024-04-26

## 2024-04-20 RX ORDER — FUROSEMIDE 10 MG/ML
40 INJECTION INTRAMUSCULAR; INTRAVENOUS ONCE
Status: DISCONTINUED | OUTPATIENT
Start: 2024-04-20 | End: 2024-04-20

## 2024-04-20 RX ORDER — FUROSEMIDE 20 MG/1
40 TABLET ORAL ONCE
Status: COMPLETED | OUTPATIENT
Start: 2024-04-20 | End: 2024-04-20

## 2024-04-20 RX ORDER — SODIUM CHLORIDE 0.9 % (FLUSH) 0.9 %
10 SYRINGE (ML) INJECTION AS NEEDED
Status: DISCONTINUED | OUTPATIENT
Start: 2024-04-20 | End: 2024-04-20 | Stop reason: HOSPADM

## 2024-04-20 RX ORDER — CEPHALEXIN 500 MG/1
500 CAPSULE ORAL ONCE
Status: COMPLETED | OUTPATIENT
Start: 2024-04-20 | End: 2024-04-20

## 2024-04-20 RX ORDER — CEPHALEXIN 500 MG/1
500 CAPSULE ORAL 4 TIMES DAILY
Qty: 28 CAPSULE | Refills: 0 | Status: SHIPPED | OUTPATIENT
Start: 2024-04-20 | End: 2024-04-26 | Stop reason: SDUPTHER

## 2024-04-20 RX ADMIN — FUROSEMIDE 40 MG: 20 TABLET ORAL at 17:56

## 2024-04-20 RX ADMIN — CEPHALEXIN 500 MG: 500 CAPSULE ORAL at 17:56

## 2024-04-20 NOTE — DISCHARGE INSTRUCTIONS
Take medications as prescribed.  Elevate your legs as often as possible..  Return to the emergency department for worsening/persistent symptoms, fever, red streaks going up your legs, or other concern.  Follow-up with your primary care provider soon as possible.

## 2024-04-20 NOTE — ED PROVIDER NOTES
EMERGENCY DEPARTMENT ENCOUNTER    Room Number:  11/11  PCP: Ector Sykes MD  Historian: Patient      HPI:  Chief Complaint: Right leg rash, leg swelling, shortness of breath  A complete HPI/ROS/PMH/PSH/SH/FH are unobtainable due to: Nothing  Context: Bernard Resendez is a 83 y.o. male with a medical history of hypertension, venous stasis, CVA, who presents to the ED c/o acute right leg rash, leg swelling, and shortness of breath.  Patient noticed a rash on his lower right leg several days ago.  Rash is somewhat painful and pruritic.  He then developed swelling in his right leg and later in his left leg.  Swelling is worse on the right.  Patient states that his legs are not normally swollen at all.  He reports mildly increased shortness of breath for the past few days.  Shortness of breath is worse with exertion.  He denies orthopnea.  He is on 2 L of oxygen at night.  Patient states he had pneumonia several weeks ago.  Denies fever, chills, cough, chest pain, abdominal pain, nausea, or vomiting.  Denies history of CHF, COPD, asthma, or DVT.  States he has gained about 20 pounds in the past 1 year.  He is on Plavix.  He does not take any diuretics.            PAST MEDICAL HISTORY  Active Ambulatory Problems     Diagnosis Date Noted    Stage 3a chronic kidney disease 02/13/2016    Hyperlipidemia 02/13/2016    Hypogonadism in male 02/13/2016    Insomnia d/t anxiety 02/13/2016    Osteopenia 02/13/2016    Hypertension 02/13/2016    Colon polyps 02/08/2017    Ulcerative colitis without complications 07/23/2019    Obesity, Class I, BMI 30-34.9 03/30/2021    DNR (do not resuscitate) 10/08/2021    History of gout 10/08/2021    Meningioma 01/27/2022    Cerebrovascular accident (CVA) of pontine structure 01/27/2022    Mild cognitive impairment 01/31/2022    Hand cramps 05/18/2023    Lung nodules 07/17/2023    Chronic pain of left knee 11/28/2023     Resolved Ambulatory Problems     Diagnosis Date Noted    Gout 02/13/2016     Edema 02/13/2016    Renal insufficiency 02/13/2016    Ulcerative colitis 02/13/2016    Conjunctivitis of both eyes 06/20/2018    Sinusitis 06/20/2018    Bronchitis 06/20/2018    Traumatic hematoma of buttock 01/15/2021    Venous stasis dermatitis of both lower extremities 01/15/2021    Pruritus 01/15/2021     Past Medical History:   Diagnosis Date    Acute CVA (cerebrovascular accident) 01/27/2022    Acute renal failure     Cancer     Diverticulosis     Hearing difficulty     History of shingles     Nephrolithiasis          PAST SURGICAL HISTORY  Past Surgical History:   Procedure Laterality Date    CIRCUMCISION N/A 6/16/2021    Procedure: CIRCUMCISION;  Surgeon: Milton Forrest MD;  Location: [facility] MAIN OR;  Service: Urology;  Laterality: N/A;    COLONOSCOPY  06/11/2015    NBIH, actuve colitis w/cryptitis, rare crypt abscess & superficial erosion, hyperplastic polyp    COLONOSCOPY N/A 8/8/2017    IH, nodular mucosa in the proximal rectum and in the distal rectum, Diverticulosis in the sigmoid colon, the descending colon and at the splenic flexure.  PATH: Hyperplastic polyps     COLONOSCOPY N/A 11/19/2019    Procedure: COLONOSCOPY to cecum with biopsies;  Surgeon: Calvin Gifford MD;  Location: [facility] ENDOSCOPY;  Service: Gastroenterology    EYE SURGERY      cataract    KIDNEY STONE SURGERY      TUMOR EXCISION      rectal tumor         FAMILY HISTORY  Family History   Problem Relation Age of Onset    Hypertension Mother     Stroke Mother     Heart disease Mother     Asthma Sister     Ulcerative colitis Sister     Emphysema Sister     Lung disease Father         mesothelioma    Glaucoma Father     Diabetes Son     Malig Hyperthermia Neg Hx          SOCIAL HISTORY  Social History     Socioeconomic History    Marital status:    Tobacco Use    Smoking status: Former     Current packs/day: 0.00     Average packs/day: 0.3 packs/day for 20.0 years (5.0 ttl pk-yrs)     Types: Cigarettes     Start date: 1961      Quit date:      Years since quittin.3     Passive exposure: Past    Smokeless tobacco: Former   Vaping Use    Vaping status: Never Used   Substance and Sexual Activity    Alcohol use: Yes     Alcohol/week: 2.0 standard drinks of alcohol     Types: 2 Shots of liquor per week     Comment: socially    Drug use: No    Sexual activity: Defer         ALLERGIES  Ciprofloxacin    REVIEW OF SYSTEMS  Review of Systems  Included in HPI  All systems reviewed and negative except for those discussed in HPI.      PHYSICAL EXAM  ED Triage Vitals   Temp Heart Rate Resp BP SpO2   24 1551 24 1551 24 1551 24 1558 24 1551   99.1 °F (37.3 °C) 88 18 144/79 97 %      Temp src Heart Rate Source Patient Position BP Location FiO2 (%)   24 1551 24 1551 -- -- --   Tympanic Monitor          Physical Exam      GENERAL: Awake, alert, oriented x 3.  Well-developed, well-nourished and nontoxic-appearing elderly male.  Resting comfortably in no acute distress  HENT: NCAT, nares patent  EYES: no scleral icterus  CV: regular rhythm, normal rate  RESPIRATORY: normal effort, clear to auscultation bilaterally  ABDOMEN: soft, nontender, nondistended  MUSCULOSKELETAL: There is right calf tenderness.  There is 3+ edema in the right lower leg and 2+ edema in the left lower leg.  Full range of motion in all extremities  NEURO: Speech is normal.  No facial droop.  PSYCH:  calm, cooperative  SKIN: There is a patchy area of an erythematous papular rash on the right lower shin.  This area is slightly warm to the touch.  There is some honey colored crusting.  No lymphangitis.  No crepitus.    Vital signs and nursing notes reviewed.          LAB RESULTS  Recent Results (from the past 24 hour(s))   ECG 12 Lead Dyspnea    Collection Time: 24  4:01 PM   Result Value Ref Range    QT Interval 379 ms    QTC Interval 427 ms   Comprehensive Metabolic Panel    Collection Time: 24  4:34 PM    Specimen: Arm,  Right; Blood   Result Value Ref Range    Glucose 85 65 - 99 mg/dL    BUN 22 8 - 23 mg/dL    Creatinine 1.24 0.76 - 1.27 mg/dL    Sodium 142 136 - 145 mmol/L    Potassium 4.5 3.5 - 5.2 mmol/L    Chloride 109 (H) 98 - 107 mmol/L    CO2 24.0 22.0 - 29.0 mmol/L    Calcium 8.6 8.6 - 10.5 mg/dL    Total Protein 6.2 6.0 - 8.5 g/dL    Albumin 3.8 3.5 - 5.2 g/dL    ALT (SGPT) 16 1 - 41 U/L    AST (SGOT) 15 1 - 40 U/L    Alkaline Phosphatase 78 39 - 117 U/L    Total Bilirubin 0.3 0.0 - 1.2 mg/dL    Globulin 2.4 gm/dL    A/G Ratio 1.6 g/dL    BUN/Creatinine Ratio 17.7 7.0 - 25.0    Anion Gap 9.0 5.0 - 15.0 mmol/L    eGFR 57.0 (L) >60.0 mL/min/1.73   BNP    Collection Time: 04/20/24  4:34 PM    Specimen: Arm, Right; Blood   Result Value Ref Range    proBNP 138.0 0.0 - 1,800.0 pg/mL   Single High Sensitivity Troponin T    Collection Time: 04/20/24  4:34 PM    Specimen: Arm, Right; Blood   Result Value Ref Range    HS Troponin T 26 (H) <22 ng/L   CBC Auto Differential    Collection Time: 04/20/24  4:34 PM    Specimen: Arm, Right; Blood   Result Value Ref Range    WBC 9.00 3.40 - 10.80 10*3/mm3    RBC 4.19 4.14 - 5.80 10*6/mm3    Hemoglobin 12.1 (L) 13.0 - 17.7 g/dL    Hematocrit 38.1 37.5 - 51.0 %    MCV 90.9 79.0 - 97.0 fL    MCH 28.9 26.6 - 33.0 pg    MCHC 31.8 31.5 - 35.7 g/dL    RDW 14.0 12.3 - 15.4 %    RDW-SD 47.1 37.0 - 54.0 fl    MPV 9.7 6.0 - 12.0 fL    Platelets 204 140 - 450 10*3/mm3    Neutrophil % 71.6 42.7 - 76.0 %    Lymphocyte % 15.7 (L) 19.6 - 45.3 %    Monocyte % 8.8 5.0 - 12.0 %    Eosinophil % 3.2 0.3 - 6.2 %    Basophil % 0.3 0.0 - 1.5 %    Immature Grans % 0.4 0.0 - 0.5 %    Neutrophils, Absolute 6.44 1.70 - 7.00 10*3/mm3    Lymphocytes, Absolute 1.41 0.70 - 3.10 10*3/mm3    Monocytes, Absolute 0.79 0.10 - 0.90 10*3/mm3    Eosinophils, Absolute 0.29 0.00 - 0.40 10*3/mm3    Basophils, Absolute 0.03 0.00 - 0.20 10*3/mm3    Immature Grans, Absolute 0.04 0.00 - 0.05 10*3/mm3    nRBC 0.0 0.0 - 0.2 /100 WBC   Gold  Top - SST    Collection Time: 04/20/24  4:34 PM   Result Value Ref Range    Extra Tube Hold for add-ons.    Light Blue Top    Collection Time: 04/20/24  4:34 PM   Result Value Ref Range    Extra Tube Hold for add-ons.    Lactic Acid, Plasma    Collection Time: 04/20/24  4:34 PM    Specimen: Arm, Right; Blood   Result Value Ref Range    Lactate 1.1 0.5 - 2.0 mmol/L   Duplex Venous Lower Extremity - Right    Collection Time: 04/20/24  5:05 PM   Result Value Ref Range    Right Common Femoral Spont Y     Right Common Femoral Competent Y     Right Common Femoral Phasic Y     Right Common Femoral Compress C     Right Common Femoral Augment Y     Right Saphenofemoral Junction Compress C     Right Profunda Femoral Compress C     Right Proximal Femoral Compress C     Right Mid Femoral Spont Y     Right Mid Femoral Competent Y     Right Mid Femoral Phasic Y     Right Mid Femoral Compress C     Right Mid Femoral Augment Y     Right Distal Femoral Compress C     Right Popliteal Spont Y     Right Popliteal Competent Y     Right Popliteal Phasic Y     Right Popliteal Compress C     Right Popliteal Augment Y     Right Posterior Tibial Compress C     Right Peroneal Compress C     Right Gastronemius Compress C     Right Greater Saph AK Compress C     Right Greater Saph BK Compress C     Right Lesser Saph Compress C     Left Common Femoral Spont Y     Left Common Femoral Competent Y     Left Common Femoral Phasic Y     Left Common Femoral Compress C     Left Common Femoral Augment Y     BH CV VAS PRELIMINARY FINDINGS SCRIPTING 1.0        Ordered the above labs and reviewed the results.        RADIOLOGY  Duplex Venous Lower Extremity - Right    Result Date: 4/20/2024    Normal right lower extremity venous duplex scan.     XR Chest 1 View    Result Date: 4/20/2024  XR CHEST 1 VW-4/20/2024  HISTORY: Shortness of breath.  Heart size is within normal limits. Lungs appear free of acute infiltrates. Bones and soft tissues are unremarkable.       1. No acute process.   This report was finalized on 4/20/2024 4:33 PM by Dr. Pool Locke M.D on Workstation: [id number]       Ordered the above noted radiological studies. Reviewed by me in PACS.            PROCEDURES  Procedures        OUTPATIENT MEDICATION MANAGEMENT:  No current Epic-ordered facility-administered medications on file.     Current Outpatient Medications Ordered in Epic   Medication Sig Dispense Refill    albuterol sulfate  (90 Base) MCG/ACT inhaler Inhale 2 puffs Every 4 (Four) Hours As Needed for Wheezing. 6.7 g 3    amLODIPine (NORVASC) 10 MG tablet TAKE ONE TABLET BY MOUTH DAILY 90 tablet 3    atenolol (TENORMIN) 50 MG tablet TAKE 1 TABLET BY MOUTH DAILY 90 tablet 0    atorvastatin (LIPITOR) 20 MG tablet TAKE THREE TABLETS BY MOUTH DAILY 270 tablet 3    azithromycin (Zithromax Z-Kyler) 250 MG tablet Take 2 tablets by mouth on day 1, then 1 tablet daily on days 2-5 6 tablet 0    cephalexin (KEFLEX) 500 MG capsule Take 1 capsule by mouth 4 (Four) Times a Day. 28 capsule 0    Cholecalciferol (VITAMIN D3) 1000 UNITS capsule Take 1 capsule by mouth Daily.      clopidogrel (PLAVIX) 75 MG tablet Take 1 tablet by mouth Daily. 90 tablet 3    Cyanocobalamin (Vitamin B12) 1000 MCG tablet controlled-release Take 1,000 mcg by mouth Daily.      furosemide (LASIX) 20 MG tablet Take 1 tablet by mouth Daily. 5 tablet 0    lisinopril (PRINIVIL,ZESTRIL) 40 MG tablet TAKE ONE TABLET BY MOUTH DAILY 90 tablet 3    Omega-3 Fatty Acids (FISH OIL) 1200 MG capsule capsule Take 1 capsule by mouth Daily With Breakfast. HOLD PER MD INSTR      sulfaSALAzine (AZULFIDINE) 500 MG tablet TAKE TWO TABLETS BY MOUTH THREE TIMES A  tablet 11    vitamin C (ASCORBIC ACID) 500 MG tablet Take 2 tablets by mouth Daily. HOLD PER MD INSTR      zolpidem CR (AMBIEN CR) 6.25 MG CR tablet Take 1 tablet by mouth At Night As Needed for Sleep. 90 tablet 0           MEDICATIONS GIVEN IN ER  Medications   cephalexin (KEFLEX)  capsule 500 mg (500 mg Oral Given 4/20/24 1756)   furosemide (LASIX) tablet 40 mg (40 mg Oral Given 4/20/24 1756)                   MEDICAL DECISION MAKING, PROGRESS, and CONSULTS    All labs have been independently reviewed by me.  All radiology studies have been reviewed by me and I have also reviewed the radiology report.   EKG's independently viewed and interpreted by me.  Discussion below represents my analysis of pertinent findings related to patient's condition, differential diagnosis, treatment plan and final disposition.      Additional sources:    - Discussed/ obtained information from independent historians: None    - External (non-ED) record review: Patient was last admitted here in January 2022 for strokelike symptoms.  He was found to have a left frontal lobe meningioma and acute stroke in the nina.  He was seen by neurology and neurosurgery.  Echocardiogram showed an EF of 61 to 65%.  LV diastolic function was indeterminate.  There was normal right ventricular systolic function.    -Prescription drug monitoring program review:     N/A    - Chronic or social conditions impacting patient care (Social Determinants of Health): None          Orders placed during this visit:  Orders Placed This Encounter   Procedures    Blood Culture - Blood,    Blood Culture - Blood,    XR Chest 1 View    Comprehensive Metabolic Panel    BNP    Single High Sensitivity Troponin T    CBC Auto Differential    [place] Draw    Lactic Acid, Plasma    Monitor Blood Pressure    Continuous Pulse Oximetry    ECG 12 Lead Dyspnea    CBC & Differential    Gold Top - SST    Light Blue Top         Additional orders considered but not ordered:          Differential diagnosis includes, but is not limited to:    Differential diagnosis includes but is not limited to CHF, acute coronary syndrome, pulmonary embolism, pneumothorax, pneumonia, asthma/COPD, deconditioning, anemia, anxiety.         Independent interpretation of labs, radiology  studies, and discussions with consultants:  ED Course as of 04/20/24 2222   Sat Apr 20, 2024   1610 EKG personally interpreted by me at 1605.  My personal interpretation is:      EKG time: 1601  Rhythm/Rate: Sinus rhythm with PVCs, rate 76  P waves and MO: Normal  QRS, axis: Normal  ST and T waves: Normal    Interpreted Contemporaneously by me, independently viewed  EKG is not significantly changed compared to prior EKG done on 1/25/2022   [WH]   1630 Chest x-ray personally interpreted by me.  My personal interpretation is: Heart size is normal.  There is left basilar atelectasis.  No pleural effusion. [WH]   1655 WBC: 9.00 [WH]   1655 Hemoglobin(!): 12.1 [WH]   1705 Per the vascular tech, duplex of the right lower extremity is negative for DVT or SVT. [WH]   1705 Lactate: 1.1 [WH]   1729 proBNP: 138.0 [WH]   1729 HS Troponin T(!): 26 [WH]   1729 Glucose: 85 [WH]   1729 Creatinine: 1.24 [WH]   1746 Test results, diagnoses, and treatment plan were discussed with the patient.  He is resting and breathing comfortably.  Oxygen saturation is 96% on room air.  He will be discharged with prescriptions for Keflex and a short course of Lasix.  He will be given a dose of Keflex and oral Lasix prior to discharge.  He was advised to follow-up with his PCP.  Return precautions were discussed. [WH]   1800 MDM: Patient presented to ED complaining of a rash on his right lower leg, bilateral leg swelling, and mild shortness of breath.  On exam, there was a patchy erythematous papular rash on the right lower leg.  There was bilateral pedal edema that was worse on the right.  Lungs were clear.  Patient was not hypoxic.  Chest x-ray was negative acute.  Doppler ultrasound of the right lower leg was negative for DVT.  White blood cell count, lactic acid, and proBNP were normal.  Rash may be due to venous insufficiency but could also be some mild cellulitis.  Patient will be treated with Keflex.  He will also be given a short course of  Lasix. []      ED Course User Index  [] Samuel Hollis MD         COMPLEXITY OF CARE  Admission was considered but after careful review of the patient's presentation, physical examination, diagnostic results, and response to treatment the patient may be safely discharged with outpatient follow-up.      DIAGNOSIS  Final diagnoses:   Cellulitis of right lower extremity   Pedal edema   Exertional dyspnea         DISPOSITION  DISCHARGE    Patient discharged in stable condition.    Reviewed implications of results, diagnosis, meds, responsibility to follow up, warning signs and symptoms of possible worsening, potential complications and reasons to return to ER, including worsening/persistent symptoms, red streaks going up the leg, fever, chest pain, or other concern..    Patient/Family voiced understanding of above instructions.    Discussed plan for discharge, as there is no emergent indication for admission. Patient referred to primary care provider for BP management due to today's BP. Pt/family is agreeable and understands need for follow up and repeat testing.  Pt is aware that discharge does not mean that nothing is wrong but it indicates no emergency is present that requires admission and they must continue care with follow-up as given below or physician of their choice.     FOLLOW-UP  Ector Sykes MD  [address] [place] [address] [phone]    Schedule an appointment as soon as possible for a visit            Medication List        New Prescriptions      cephalexin 500 MG capsule  Commonly known as: KEFLEX  Take 1 capsule by mouth 4 (Four) Times a Day.     furosemide 20 MG tablet  Commonly known as: LASIX  Take 1 tablet by mouth Daily.               Where to Get Your Medications        These medications were sent to [facility] [id number] - [place], [place] - [address] AT [facility] [phone] [facility] [phone]   [address], [facility] [address]       Phone: [phone]   cephalexin 500 MG capsule  furosemide 20 MG tablet                   Latest Documented Vital Signs:  AS OF 22:22 EDT VITALS:    BP - 144/79  HR - 71  TEMP - 99.1 °F (37.3 °C) (Tympanic)  O2 SATS - 95%            --    Please note that portions of this were completed with a voice recognition program.       Note Disclaimer: At [facility], we believe that sharing information builds trust and better relationships. You are receiving this note because you are receiving care at [facility] or recently visited. It is possible you will see health information before a provider has talked with you about it. This kind of information can be easy to misunderstand. To help you fully understand what it means for your health, we urge you to discuss this note with your provider.             Samuel Hollis MD  04/20/24 [id number]

## 2024-04-22 NOTE — TELEPHONE ENCOUNTER
Patient returned call and stated that he still had 5 days of antibiotics left, today was last day of steroid and still had some tessalon cough medication left.  He also is starting mucinex today as well.  Advised he should complete antibiotic and if not better, to let us know or if he worsens.  Pt expressed understanding.   Provider: ROYA COVARRUBIAS    Caller: LEORA    Relationship to Patient: SELF    Pharmacy: [facility]    Phone Number: [phone]     Reason for Call:   MIGRAINE    When was the patient last seen:   2-22-24    When did it start: 4-15-24    Where is it located:   ALL HER HEAD, BEHIND HER EYES AND AT HER TEMPLES.    Characteristics of symptom/severity: PAIN LEVEL IS A 10    Timing- Is it constant or intermittent: INTERMITTENT    What makes it worse: EYE GLASSES, LOUD SOUNDS, LIGHT, TV    What makes it better: NOTHING    What therapies/medications have you tried:   HOT SHOWER W/VICKS VAPOR RUB, COOL SHOWER AS WELL. SHE TRY RUBBING JUST VICKS BACK OF HER NECK AS WELL AS AROUND HER SINUSES AND NOSE.     PT CANNOT TAKE THE NASAL SPRAY BECAUSE IT MAKES HER VOMIT.    PT IS ALSO TAKING butalbital-acetaminophen-caffeine     PT WENT TO ED IN [facility] ON 4-16-24 AND WAS VERY UNHAPPY WITH TREATMENT. THEY WERE GOING TO GIVE HER RX THAT SHE IS ALLERGIC TO. PT WAS GIVEN A STEROID WHICH MADE HER SUGAR GO UP TO OVER 600.    PT IS ALSO ON AN ANTIBIOTIC FOR UTI.    PLEASE CALL PT TO DISCUSS TREATMENT.

## 2024-04-25 LAB
BACTERIA SPEC AEROBE CULT: NORMAL
BACTERIA SPEC AEROBE CULT: NORMAL

## 2024-04-26 ENCOUNTER — OFFICE VISIT (OUTPATIENT)
Dept: INTERNAL MEDICINE | Facility: CLINIC | Age: 84
End: 2024-04-26
Payer: MEDICARE

## 2024-04-26 VITALS
OXYGEN SATURATION: 96 % | SYSTOLIC BLOOD PRESSURE: 118 MMHG | DIASTOLIC BLOOD PRESSURE: 62 MMHG | TEMPERATURE: 97.7 F | HEART RATE: 78 BPM

## 2024-04-26 DIAGNOSIS — L03.115 CELLULITIS OF RIGHT LOWER EXTREMITY: ICD-10-CM

## 2024-04-26 DIAGNOSIS — R60.0 BILATERAL LOWER EXTREMITY EDEMA: Primary | ICD-10-CM

## 2024-04-26 PROCEDURE — 3078F DIAST BP <80 MM HG: CPT | Performed by: FAMILY MEDICINE

## 2024-04-26 PROCEDURE — 3074F SYST BP LT 130 MM HG: CPT | Performed by: FAMILY MEDICINE

## 2024-04-26 PROCEDURE — 99214 OFFICE O/P EST MOD 30 MIN: CPT | Performed by: FAMILY MEDICINE

## 2024-04-26 RX ORDER — POTASSIUM CHLORIDE 20 MEQ/1
TABLET, EXTENDED RELEASE ORAL
Qty: 90 TABLET | Refills: 3 | Status: SHIPPED | OUTPATIENT
Start: 2024-04-26

## 2024-04-26 RX ORDER — CEPHALEXIN 500 MG/1
500 CAPSULE ORAL 4 TIMES DAILY
Qty: 16 CAPSULE | Refills: 0 | Status: SHIPPED | OUTPATIENT
Start: 2024-04-26 | End: 2024-04-30

## 2024-04-26 RX ORDER — FUROSEMIDE 40 MG/1
40 TABLET ORAL DAILY PRN
Qty: 90 TABLET | Refills: 3 | Status: SHIPPED | OUTPATIENT
Start: 2024-04-26

## 2024-04-26 NOTE — PROGRESS NOTES
Date of Encounter: 2024  Patient: Bernard Resendez,  1940    Subjective   History of Presenting Illness  Chief complaint: ER follow-up    Patient was treated for upper lobe pneumonia with appropriate antibiotics as well as prednisone on 2024.  He is very sedentary during this time.  He ended up developing swelling of both legs, shortness of breath, and a rash on his right lower extremity and presented to the emergency room on .  Chest x-ray was fairly clear, although my impression is a small volume overload.  No major concerns with blood work, only mild anemia noted with no leukocytosis.  They did an ultrasound which was negative for DVT.  proBNP was normal.  They gave him furosemide 20 mg for 5 days and a course of cephalexin.    He has been tolerating this medication since he has been home with improvement in his swelling and rash, although the swelling is still present today.  He has not been excessive on his salt intake.  No shortness of breath.  He is tolerating Keflex well.  No chest discomfort.    The following portions of the patient's history were reviewed and updated as appropriate: allergies, current medications, past family history, past medical history, past social history, past surgical history and problem list.    Patient Active Problem List   Diagnosis    Stage 3a chronic kidney disease    Hyperlipidemia    Hypogonadism in male    Insomnia d/t anxiety    Osteopenia    Hypertension    Colon polyps    Ulcerative colitis without complications    Obesity, Class I, BMI 30-34.9    DNR (do not resuscitate)    History of gout    Meningioma    Cerebrovascular accident (CVA) of pontine structure    Mild cognitive impairment    Hand cramps    Lung nodules    Chronic pain of left knee     Past Medical History:   Diagnosis Date    Acute CVA (cerebrovascular accident) 2022    Acute renal failure     Cancer     SKIN LESION REMOVED    Diverticulosis     Gout 2016    Hearing difficulty      History of shingles     Hyperlipidemia     Hypertension     Hypogonadism in male     Insomnia     Meningioma 01/27/2022    Nephrolithiasis     Obesity, Class I, BMI 30-34.9 03/30/2021    Osteopenia     Osteopenia     Pruritus 01/15/2021    Renal insufficiency     Ulcerative colitis     Venous stasis dermatitis of both lower extremities 01/15/2021     Past Surgical History:   Procedure Laterality Date    CIRCUMCISION N/A 6/16/2021    Procedure: CIRCUMCISION;  Surgeon: Milton Forrest MD;  Location: [facility] MAIN OR;  Service: Urology;  Laterality: N/A;    COLONOSCOPY  06/11/2015    NBIH, actuve colitis w/cryptitis, rare crypt abscess & superficial erosion, hyperplastic polyp    COLONOSCOPY N/A 8/8/2017    IH, nodular mucosa in the proximal rectum and in the distal rectum, Diverticulosis in the sigmoid colon, the descending colon and at the splenic flexure.  PATH: Hyperplastic polyps     COLONOSCOPY N/A 11/19/2019    Procedure: COLONOSCOPY to cecum with biopsies;  Surgeon: Calvin Gifford MD;  Location: [facility] ENDOSCOPY;  Service: Gastroenterology    EYE SURGERY      cataract    KIDNEY STONE SURGERY      TUMOR EXCISION      rectal tumor     Family History   Problem Relation Age of Onset    Hypertension Mother     Stroke Mother     Heart disease Mother     Asthma Sister     Ulcerative colitis Sister     Emphysema Sister     Lung disease Father         mesothelioma    Glaucoma Father     Diabetes Son     Malig Hyperthermia Neg Hx        Current Outpatient Medications:     albuterol sulfate  (90 Base) MCG/ACT inhaler, Inhale 2 puffs Every 4 (Four) Hours As Needed for Wheezing., Disp: 6.7 g, Rfl: 3    amLODIPine (NORVASC) 10 MG tablet, TAKE ONE TABLET BY MOUTH DAILY, Disp: 90 tablet, Rfl: 3    atenolol (TENORMIN) 50 MG tablet, TAKE 1 TABLET BY MOUTH DAILY, Disp: 90 tablet, Rfl: 0    atorvastatin (LIPITOR) 20 MG tablet, TAKE THREE TABLETS BY MOUTH DAILY, Disp: 270 tablet, Rfl: 3    cephalexin (KEFLEX) 500 MG  capsule, Take 1 capsule by mouth 4 (Four) Times a Day for 4 days., Disp: 16 capsule, Rfl: 0    Cholecalciferol (VITAMIN D3) 1000 UNITS capsule, Take 1 capsule by mouth Daily., Disp: , Rfl:     clopidogrel (PLAVIX) 75 MG tablet, Take 1 tablet by mouth Daily., Disp: 90 tablet, Rfl: 3    Cyanocobalamin (Vitamin B12) 1000 MCG tablet controlled-release, Take 1,000 mcg by mouth Daily., Disp: , Rfl:     lisinopril (PRINIVIL,ZESTRIL) 40 MG tablet, TAKE ONE TABLET BY MOUTH DAILY, Disp: 90 tablet, Rfl: 3    Omega-3 Fatty Acids (FISH OIL) 1200 MG capsule capsule, Take 1 capsule by mouth Daily With Breakfast. HOLD PER MD INSTR, Disp: , Rfl:     sulfaSALAzine (AZULFIDINE) 500 MG tablet, TAKE TWO TABLETS BY MOUTH THREE TIMES A DAY, Disp: 180 tablet, Rfl: 11    vitamin C (ASCORBIC ACID) 500 MG tablet, Take 2 tablets by mouth Daily. HOLD PER MD INSTR, Disp: , Rfl:     zolpidem CR (AMBIEN CR) 6.25 MG CR tablet, Take 1 tablet by mouth At Night As Needed for Sleep., Disp: 90 tablet, Rfl: 0    furosemide (Lasix) 40 MG tablet, Take 1 tablet by mouth Daily As Needed (swelling)., Disp: 90 tablet, Rfl: 3    potassium chloride (KLOR-CON M20) 20 MEQ CR tablet, Take 1 tablet daily when taking furosemide (lasix), Disp: 90 tablet, Rfl: 3  Allergies   Allergen Reactions    Ciprofloxacin Rash     Pt unsure reaction     Social History     Tobacco Use    Smoking status: Former     Current packs/day: 0.00     Average packs/day: 0.3 packs/day for 20.0 years (5.0 ttl pk-yrs)     Types: Cigarettes     Start date:      Quit date:      Years since quittin.3     Passive exposure: Past    Smokeless tobacco: Former   Vaping Use    Vaping status: Never Used   Substance Use Topics    Alcohol use: Yes     Alcohol/week: 2.0 standard drinks of alcohol     Types: 2 Shots of liquor per week     Comment: socially    Drug use: No          Objective   Physical Exam  Vitals:    24 1047   BP: 118/62   BP Location: Left arm   Patient Position: Sitting    Cuff Size: Large Adult   Pulse: 78   Temp: 97.7 °F (36.5 °C)   TempSrc: Infrared   SpO2: 96%     There is no height or weight on file to calculate BMI.    Constitutional: NAD.  Cardiovascular: RRR. No murmurs.  2+ pitting edema of the lower legs bilaterally from intermediate down the lower legs.  There is navin venous stasis dermatitis bilaterally right greater than left, with what appears to be cellulitis resolving on the anterior right shin.  Pulmonary: CTA b/l. Good effort.  Psychiatric: Normal affect. Normal thought content.     Assessment & Plan   Assessment and Plan  Pleasant 83-year-old male with hypertension, hyperlipidemia, ulcerative colitis, CKD 3A, history of CVA, meningioma, osteopenia, history of gout, insomnia, among other problems, who presents with the following:      Diagnoses and all orders for this visit:    1. Bilateral lower extremity edema (Primary)  -     furosemide (Lasix) 40 MG tablet; Take 1 tablet by mouth Daily As Needed (swelling).  Dispense: 90 tablet; Refill: 3  -     potassium chloride (KLOR-CON M20) 20 MEQ CR tablet; Take 1 tablet daily when taking furosemide (lasix)  Dispense: 90 tablet; Refill: 3    2. Cellulitis of right lower extremity  -     cephalexin (KEFLEX) 500 MG capsule; Take 1 capsule by mouth 4 (Four) Times a Day for 4 days.  Dispense: 16 capsule; Refill: 0    Persistent lower extremity edema, this is probably due to sedentary.  With pneumonia along with prednisone causing fluid retention as his recent echocardiogram 2022 is fairly reassuring.  We will increase his furosemide to 40 mg daily, add potassium to it for when he is taking it, and extend his cephalexin course by 4 days.  I recommend regular leg elevation at home, okay to apply Eucerin to both legs to help with venous stasis dermatitis.  We will see him back next week to make sure he is improving.  Discussed reasons to seek further evaluation earlier.    Ector Sykes MD  Family Medicine  O:  [phone]    Disclaimer: Parts of this note were dictated by speech recognition. Minor errors in transcription may be present. Please call if questions.

## 2024-05-03 ENCOUNTER — OFFICE VISIT (OUTPATIENT)
Dept: INTERNAL MEDICINE | Facility: CLINIC | Age: 84
End: 2024-05-03
Payer: MEDICARE

## 2024-05-03 ENCOUNTER — HOSPITAL ENCOUNTER (OUTPATIENT)
Dept: CARDIOLOGY | Facility: HOSPITAL | Age: 84
Discharge: HOME | End: 2024-05-03
Payer: MEDICARE

## 2024-05-03 VITALS
BODY MASS INDEX: 35.95 KG/M2 | TEMPERATURE: 97.9 F | HEIGHT: 71 IN | OXYGEN SATURATION: 95 % | DIASTOLIC BLOOD PRESSURE: 66 MMHG | SYSTOLIC BLOOD PRESSURE: 124 MMHG | HEART RATE: 86 BPM | WEIGHT: 256.8 LBS

## 2024-05-03 DIAGNOSIS — I87.2 VENOUS STASIS DERMATITIS: ICD-10-CM

## 2024-05-03 DIAGNOSIS — M79.89 RIGHT LEG SWELLING: Primary | ICD-10-CM

## 2024-05-03 DIAGNOSIS — L03.115 CELLULITIS OF RIGHT LOWER EXTREMITY: ICD-10-CM

## 2024-05-03 DIAGNOSIS — M79.89 RIGHT LEG SWELLING: ICD-10-CM

## 2024-05-03 LAB
BH CV LOWER VASCULAR LEFT COMMON FEMORAL AUGMENT: NORMAL
BH CV LOWER VASCULAR LEFT COMMON FEMORAL COMPETENT: NORMAL
BH CV LOWER VASCULAR LEFT COMMON FEMORAL COMPRESS: NORMAL
BH CV LOWER VASCULAR LEFT COMMON FEMORAL PHASIC: NORMAL
BH CV LOWER VASCULAR LEFT COMMON FEMORAL SPONT: NORMAL
BH CV LOWER VASCULAR RIGHT COMMON FEMORAL AUGMENT: NORMAL
BH CV LOWER VASCULAR RIGHT COMMON FEMORAL COMPETENT: NORMAL
BH CV LOWER VASCULAR RIGHT COMMON FEMORAL COMPRESS: NORMAL
BH CV LOWER VASCULAR RIGHT COMMON FEMORAL PHASIC: NORMAL
BH CV LOWER VASCULAR RIGHT COMMON FEMORAL SPONT: NORMAL
BH CV LOWER VASCULAR RIGHT DISTAL FEMORAL COMPRESS: NORMAL
BH CV LOWER VASCULAR RIGHT GASTRONEMIUS COMPRESS: NORMAL
BH CV LOWER VASCULAR RIGHT GREATER SAPH AK COMPRESS: NORMAL
BH CV LOWER VASCULAR RIGHT GREATER SAPH BK COMPRESS: NORMAL
BH CV LOWER VASCULAR RIGHT LESSER SAPH COMPRESS: NORMAL
BH CV LOWER VASCULAR RIGHT MID FEMORAL AUGMENT: NORMAL
BH CV LOWER VASCULAR RIGHT MID FEMORAL COMPETENT: NORMAL
BH CV LOWER VASCULAR RIGHT MID FEMORAL COMPRESS: NORMAL
BH CV LOWER VASCULAR RIGHT MID FEMORAL PHASIC: NORMAL
BH CV LOWER VASCULAR RIGHT MID FEMORAL SPONT: NORMAL
BH CV LOWER VASCULAR RIGHT PERONEAL COMPRESS: NORMAL
BH CV LOWER VASCULAR RIGHT POPLITEAL AUGMENT: NORMAL
BH CV LOWER VASCULAR RIGHT POPLITEAL COMPETENT: NORMAL
BH CV LOWER VASCULAR RIGHT POPLITEAL COMPRESS: NORMAL
BH CV LOWER VASCULAR RIGHT POPLITEAL PHASIC: NORMAL
BH CV LOWER VASCULAR RIGHT POPLITEAL SPONT: NORMAL
BH CV LOWER VASCULAR RIGHT POSTERIOR TIBIAL COMPRESS: NORMAL
BH CV LOWER VASCULAR RIGHT PROFUNDA FEMORAL COMPRESS: NORMAL
BH CV LOWER VASCULAR RIGHT PROXIMAL FEMORAL COMPRESS: NORMAL
BH CV LOWER VASCULAR RIGHT SAPHENOFEMORAL JUNCTION COMPRESS: NORMAL

## 2024-05-03 PROCEDURE — 93971 EXTREMITY STUDY: CPT

## 2024-05-03 PROCEDURE — 99214 OFFICE O/P EST MOD 30 MIN: CPT | Performed by: FAMILY MEDICINE

## 2024-05-03 PROCEDURE — 3074F SYST BP LT 130 MM HG: CPT | Performed by: FAMILY MEDICINE

## 2024-05-03 PROCEDURE — 3078F DIAST BP <80 MM HG: CPT | Performed by: FAMILY MEDICINE

## 2024-05-03 RX ORDER — CEPHALEXIN 500 MG/1
500 CAPSULE ORAL 4 TIMES DAILY
Qty: 20 CAPSULE | Refills: 0 | Status: SHIPPED | OUTPATIENT
Start: 2024-05-03 | End: 2024-05-08

## 2024-05-03 RX ORDER — TRIAMCINOLONE ACETONIDE 0.25 MG/G
1 CREAM TOPICAL 2 TIMES DAILY
Qty: 80 G | Refills: 3 | Status: SHIPPED | OUTPATIENT
Start: 2024-05-03

## 2024-05-03 NOTE — PROGRESS NOTES
Date of Encounter: 2024  Patient: Bernard Resendez,  1940    Subjective   History of Presenting Illness  Chief complaint: Follow up right leg swelling    Patient has noticed improvement in his right lower extremity and left lower extremity swelling along with improvement in the erythema on the right anterior shin.  He has been tolerating both furosemide, potassium and Keflex without any side effects.  He does note persistent tenderness in the right posterior calf and behind the kneecap which is a little worse compared to previously, but does not bother him unless he is squeezing it.  He does not feel dehydrated or dizzy.  He does not have fever or chills.  He is asking about triamcinolone for the venous stasis dermatitis.    The following portions of the patient's history were reviewed and updated as appropriate: allergies, current medications, past family history, past medical history, past social history, past surgical history and problem list.    Patient Active Problem List   Diagnosis    Stage 3a chronic kidney disease    Hyperlipidemia    Hypogonadism in male    Insomnia d/t anxiety    Osteopenia    Hypertension    Colon polyps    Ulcerative colitis without complications    Obesity, Class I, BMI 30-34.9    DNR (do not resuscitate)    History of gout    Meningioma    Cerebrovascular accident (CVA) of pontine structure    Mild cognitive impairment    Hand cramps    Lung nodules    Chronic pain of left knee     Past Medical History:   Diagnosis Date    Acute CVA (cerebrovascular accident) 2022    Acute renal failure     Cancer     SKIN LESION REMOVED    Diverticulosis     Gout 2016    Hearing difficulty     History of shingles     Hyperlipidemia     Hypertension     Hypogonadism in male     Insomnia     Meningioma 2022    Nephrolithiasis     Obesity, Class I, BMI 30-34.9 2021    Osteopenia     Osteopenia     Pruritus 01/15/2021    Renal insufficiency     Ulcerative colitis      Venous stasis dermatitis of both lower extremities 01/15/2021     Past Surgical History:   Procedure Laterality Date    CIRCUMCISION N/A 6/16/2021    Procedure: CIRCUMCISION;  Surgeon: Milton Forrest MD;  Location: [facility] MAIN OR;  Service: Urology;  Laterality: N/A;    COLONOSCOPY  06/11/2015    NBIH, actuve colitis w/cryptitis, rare crypt abscess & superficial erosion, hyperplastic polyp    COLONOSCOPY N/A 8/8/2017    IH, nodular mucosa in the proximal rectum and in the distal rectum, Diverticulosis in the sigmoid colon, the descending colon and at the splenic flexure.  PATH: Hyperplastic polyps     COLONOSCOPY N/A 11/19/2019    Procedure: COLONOSCOPY to cecum with biopsies;  Surgeon: Calvin Gifford MD;  Location: [facility] ENDOSCOPY;  Service: Gastroenterology    EYE SURGERY      cataract    KIDNEY STONE SURGERY      TUMOR EXCISION      rectal tumor     Family History   Problem Relation Age of Onset    Hypertension Mother     Stroke Mother     Heart disease Mother     Asthma Sister     Ulcerative colitis Sister     Emphysema Sister     Lung disease Father         mesothelioma    Glaucoma Father     Diabetes Son     Malig Hyperthermia Neg Hx        Current Outpatient Medications:     albuterol sulfate  (90 Base) MCG/ACT inhaler, Inhale 2 puffs Every 4 (Four) Hours As Needed for Wheezing., Disp: 6.7 g, Rfl: 3    amLODIPine (NORVASC) 10 MG tablet, TAKE ONE TABLET BY MOUTH DAILY, Disp: 90 tablet, Rfl: 3    atenolol (TENORMIN) 50 MG tablet, TAKE 1 TABLET BY MOUTH DAILY, Disp: 90 tablet, Rfl: 0    atorvastatin (LIPITOR) 20 MG tablet, TAKE THREE TABLETS BY MOUTH DAILY, Disp: 270 tablet, Rfl: 3    Cholecalciferol (VITAMIN D3) 1000 UNITS capsule, Take 1 capsule by mouth Daily., Disp: , Rfl:     clopidogrel (PLAVIX) 75 MG tablet, Take 1 tablet by mouth Daily., Disp: 90 tablet, Rfl: 3    Cyanocobalamin (Vitamin B12) 1000 MCG tablet controlled-release, Take 1,000 mcg by mouth Daily., Disp: , Rfl:     furosemide  (Lasix) 40 MG tablet, Take 1 tablet by mouth Daily As Needed (swelling)., Disp: 90 tablet, Rfl: 3    lisinopril (PRINIVIL,ZESTRIL) 40 MG tablet, TAKE ONE TABLET BY MOUTH DAILY, Disp: 90 tablet, Rfl: 3    Omega-3 Fatty Acids (FISH OIL) 1200 MG capsule capsule, Take 1 capsule by mouth Daily With Breakfast. HOLD PER MD INSTR, Disp: , Rfl:     potassium chloride (KLOR-CON M20) 20 MEQ CR tablet, Take 1 tablet daily when taking furosemide (lasix), Disp: 90 tablet, Rfl: 3    sulfaSALAzine (AZULFIDINE) 500 MG tablet, TAKE TWO TABLETS BY MOUTH THREE TIMES A DAY, Disp: 180 tablet, Rfl: 11    vitamin C (ASCORBIC ACID) 500 MG tablet, Take 2 tablets by mouth Daily. HOLD PER MD INSTR, Disp: , Rfl:     zolpidem CR (AMBIEN CR) 6.25 MG CR tablet, Take 1 tablet by mouth At Night As Needed for Sleep., Disp: 90 tablet, Rfl: 0    cephalexin (Keflex) 500 MG capsule, Take 1 capsule by mouth 4 (Four) Times a Day for 5 days., Disp: 20 capsule, Rfl: 0    triamcinolone (KENALOG) 0.025 % cream, Apply 1 Application topically to the appropriate area as directed 2 (Two) Times a Day., Disp: 80 g, Rfl: 3  Allergies   Allergen Reactions    Ciprofloxacin Rash     Pt unsure reaction     Social History     Tobacco Use    Smoking status: Former     Current packs/day: 0.00     Average packs/day: 0.3 packs/day for 20.0 years (5.0 ttl pk-yrs)     Types: Cigarettes     Start date:      Quit date:      Years since quittin.3     Passive exposure: Past    Smokeless tobacco: Former   Vaping Use    Vaping status: Never Used   Substance Use Topics    Alcohol use: Yes     Alcohol/week: 2.0 standard drinks of alcohol     Types: 2 Shots of liquor per week     Comment: socially    Drug use: No          Objective   Physical Exam  Vitals:    24 1029   BP: 124/66   BP Location: Right arm   Patient Position: Sitting   Cuff Size: Large Adult   Pulse: 86   Temp: 97.9 °F (36.6 °C)   TempSrc: Infrared   SpO2: 95%   Weight: 116 kg (256 lb 12.8 oz)   Height:  "179.1 cm (70.5\")     Body mass index is 36.33 kg/m².    Constitutional: NAD.  Extremity: Resolving cellulitis on the right lower extremity, although still mildly erythematous and warm on exam today.  More concerning when he has significant right calf tenderness and firmness along with discomfort in the right popliteal fossa.  He does have 2+ edema in the right lower extremity below the knee, 1+ edema in the left lower extremity.  Right leg remains more swollen than the left.  Mild venous stasis dermatitis on the left lower extremity.  DP pulse intact bilaterally.  Psychiatric: Normal affect. Normal thought content.     Assessment & Plan   Assessment and Plan  Pleasant 83-year-old male with hypertension, hyperlipidemia, ulcerative colitis, CKD 3A, history of CVA, meningioma, osteopenia, history of gout, insomnia, among other problems, who presents with the following:      Diagnoses and all orders for this visit:    1. Right leg swelling (Primary): Note he had a normal ultrasound on 4/20 he has significantly more swelling in the right lower leg as well as calf tenderness and discomfort in his right popliteal fossa.  I would like to repeat the ultrasound just to make sure he has not developed a blood clot in the interim.  -     Duplex Venous Lower Extremity - Right CAR; Future    2. Venous stasis dermatitis:  -     triamcinolone (KENALOG) 0.025 % cream; Apply 1 Application topically to the appropriate area as directed 2 (Two) Times a Day.  Dispense: 80 g; Refill: 3    3. Cellulitis of right lower extremity: Continue furosemide, we will also add another 5 days of cephalexin which will bring his total to approximately 9 days  -     cephalexin (Keflex) 500 MG capsule; Take 1 capsule by mouth 4 (Four) Times a Day for 5 days.  Dispense: 20 capsule; Refill: 0    We will follow-up in another 2 weeks just to make sure this is continuing to resolve    Ector Sykes MD  Family Medicine  O: [phone]    Disclaimer: Parts of " this note were dictated by speech recognition. Minor errors in transcription may be present. Please call if questions.

## 2024-05-21 DIAGNOSIS — I10 ESSENTIAL HYPERTENSION: ICD-10-CM

## 2024-05-21 RX ORDER — LISINOPRIL 40 MG/1
40 TABLET ORAL DAILY
Qty: 90 TABLET | Refills: 0 | Status: SHIPPED | OUTPATIENT
Start: 2024-05-21

## 2024-05-28 DIAGNOSIS — I10 ESSENTIAL HYPERTENSION: ICD-10-CM

## 2024-05-28 RX ORDER — ATENOLOL 50 MG/1
50 TABLET ORAL DAILY
Qty: 90 TABLET | Refills: 1 | Status: SHIPPED | OUTPATIENT
Start: 2024-05-28

## 2024-06-10 ENCOUNTER — HOSPITAL ENCOUNTER (OUTPATIENT)
Dept: CT IMAGING | Facility: HOSPITAL | Age: 84
Discharge: HOME | End: 2024-06-10
Admitting: INTERNAL MEDICINE
Payer: MEDICARE

## 2024-06-10 DIAGNOSIS — R91.1 PULMONARY NODULE: ICD-10-CM

## 2024-06-10 PROCEDURE — 71250 CT THORAX DX C-: CPT

## 2024-06-12 DIAGNOSIS — G47.00 INSOMNIA, UNSPECIFIED TYPE: ICD-10-CM

## 2024-06-12 RX ORDER — ZOLPIDEM TARTRATE 6.25 MG/1
6.25 TABLET, FILM COATED, EXTENDED RELEASE ORAL NIGHTLY PRN
Qty: 90 TABLET | Refills: 1 | Status: SHIPPED | OUTPATIENT
Start: 2024-06-21

## 2024-06-12 NOTE — TELEPHONE ENCOUNTER
Rx Refill Note  Requested Prescriptions     Pending Prescriptions Disp Refills    zolpidem CR (AMBIEN CR) 6.25 MG CR tablet [Pharmacy Med Name: ZOLPIDEM TART ER 6.25 MG TAB] 90 tablet      Sig: TAKE ONE TABLET BY MOUTH AT NIGHT AS NEEDED FOR SLEEP      Last office visit with prescribing clinician: 5/3/2024   Last telemedicine visit with prescribing clinician: Visit date not found   Next office visit with prescribing clinician: Visit date not found       Charissa Medrano LPN  06/12/24, 14:52 EDT

## 2024-06-20 ENCOUNTER — TRANSCRIBE ORDERS (OUTPATIENT)
Dept: ADMINISTRATIVE | Facility: HOSPITAL | Age: 84
End: 2024-06-20
Payer: MEDICARE

## 2024-06-20 DIAGNOSIS — R91.8 LUNG NODULES: Primary | ICD-10-CM

## 2024-07-24 ENCOUNTER — TELEPHONE (OUTPATIENT)
Dept: INTERNAL MEDICINE | Facility: CLINIC | Age: 84
End: 2024-07-24

## 2024-07-24 NOTE — TELEPHONE ENCOUNTER
Caller: Bernard Resendez    Relationship: Self    Best call back number: [phone]     What does billing need from the patient: PATIENT GOT A BILL FOR NEBULIZER MACHINE THAT HE STATES IS NOT USING AND WANTS TO KNOW WHAT HE CAN DO TO KEEP THEM FROM BILLING HIS INSURANCE COMPANY EVERY MONTH FOR IT FOR THE NEXT YEAR     STATES IT [facility] THAT IS BILLING     PLEASE CALL AND ADVISE

## 2024-07-29 NOTE — TELEPHONE ENCOUNTER
Called patient back. Patient states he called them and they told him to call the providers office. Gave patient [phone] to call them and get a location to drop off the machine.

## 2024-08-05 ENCOUNTER — TELEPHONE (OUTPATIENT)
Dept: NEUROLOGY | Facility: CLINIC | Age: 84
End: 2024-08-05
Payer: MEDICARE

## 2024-08-05 NOTE — TELEPHONE ENCOUNTER
Spoke to pt in regards to r/s appt, informed him we will put him on a wait list and schedule him in NOV, and contact whenever there is an earlier opening.

## 2024-08-12 DIAGNOSIS — I10 ESSENTIAL HYPERTENSION: ICD-10-CM

## 2024-08-12 RX ORDER — LISINOPRIL 40 MG/1
40 TABLET ORAL DAILY
Qty: 90 TABLET | Refills: 0 | Status: SHIPPED | OUTPATIENT
Start: 2024-08-12

## 2024-10-10 ENCOUNTER — TRANSCRIBE ORDERS (OUTPATIENT)
Dept: SLEEP MEDICINE | Facility: HOSPITAL | Age: 84
End: 2024-10-10
Payer: MEDICARE

## 2024-10-10 DIAGNOSIS — G47.34 NOCTURNAL HYPOXIA: ICD-10-CM

## 2024-10-10 DIAGNOSIS — E66.811 OBESITY, CLASS I, BMI 30-34.9: ICD-10-CM

## 2024-10-10 DIAGNOSIS — G47.00 INSOMNIA, UNSPECIFIED TYPE: Primary | ICD-10-CM

## 2024-10-10 DIAGNOSIS — R06.83 SNORING: ICD-10-CM

## 2024-10-10 DIAGNOSIS — R09.02 HYPOXIA: Primary | ICD-10-CM

## 2024-10-15 ENCOUNTER — OFFICE VISIT (OUTPATIENT)
Dept: INTERNAL MEDICINE | Facility: CLINIC | Age: 84
End: 2024-10-15
Payer: MEDICARE

## 2024-10-15 VITALS
DIASTOLIC BLOOD PRESSURE: 74 MMHG | SYSTOLIC BLOOD PRESSURE: 122 MMHG | OXYGEN SATURATION: 97 % | WEIGHT: 264.8 LBS | HEART RATE: 64 BPM | TEMPERATURE: 97.3 F | BODY MASS INDEX: 37.46 KG/M2

## 2024-10-15 DIAGNOSIS — I87.2 VENOUS STASIS DERMATITIS: Primary | ICD-10-CM

## 2024-10-15 DIAGNOSIS — Z23 NEED FOR PNEUMOCOCCAL VACCINE: ICD-10-CM

## 2024-10-15 DIAGNOSIS — J45.40 MODERATE PERSISTENT ASTHMA WITHOUT COMPLICATION: ICD-10-CM

## 2024-10-15 PROCEDURE — G0009 ADMIN PNEUMOCOCCAL VACCINE: HCPCS | Performed by: FAMILY MEDICINE

## 2024-10-15 PROCEDURE — 90677 PCV20 VACCINE IM: CPT | Performed by: FAMILY MEDICINE

## 2024-10-15 PROCEDURE — 99213 OFFICE O/P EST LOW 20 MIN: CPT | Performed by: FAMILY MEDICINE

## 2024-10-15 PROCEDURE — 1125F AMNT PAIN NOTED PAIN PRSNT: CPT | Performed by: FAMILY MEDICINE

## 2024-10-15 RX ORDER — TRIAMCINOLONE ACETONIDE 0.25 MG/G
1 CREAM TOPICAL 2 TIMES DAILY
Qty: 80 G | Refills: 3 | Status: SHIPPED | OUTPATIENT
Start: 2024-10-15

## 2024-10-15 NOTE — PROGRESS NOTES
Date of Encounter: 10/15/2024  Patient: Bernard Resendez,  1940    Subjective   History of Presenting Illness  Chief complaint: Leg rash    Venous stasis dermatitis, better on furosemide and occasionally does leg elevation, but still has an itchy rash intermittently right greater than left.  He does tolerate the furosemide.  Does have excess salt in his diet.  Does not use triamcinolone topically, does not use compression socks.  Symptoms are mostly itchy, no pain, swelling remains mild.    Has questions about his diagnosis of moderate persistent asthma through pulmonology.  He does get short of breath with exertion.  He does get bronchitis frequently in the winter.  He does have a chronic productive cough.  He is not currently using his Trelegy that they prescribed him previously.  He does use his albuterol as needed.    The following portions of the patient's history were reviewed and updated as appropriate: allergies, current medications, past family history, past medical history, past social history, past surgical history and problem list.    Patient Active Problem List   Diagnosis    Stage 3a chronic kidney disease    Hyperlipidemia    Hypogonadism in male    Insomnia d/t anxiety    Osteopenia    Hypertension    Colon polyps    Ulcerative colitis without complications    Obesity, Class I, BMI 30-34.9    DNR (do not resuscitate)    History of gout    Meningioma    Cerebrovascular accident (CVA) of pontine structure    Mild cognitive impairment    Hand cramps    Lung nodules    Chronic pain of left knee     Past Medical History:   Diagnosis Date    Acute CVA (cerebrovascular accident) 2022    Acute renal failure     Cancer     SKIN LESION REMOVED    Diverticulosis     Gout 2016    Hearing difficulty     History of shingles     Hyperlipidemia     Hypertension     Hypogonadism in male     Insomnia     Meningioma 2022    Nephrolithiasis     Obesity, Class I, BMI 30-34.9 2021    Osteopenia      Osteopenia     Pruritus 01/15/2021    Renal insufficiency     Ulcerative colitis     Venous stasis dermatitis of both lower extremities 01/15/2021     Past Surgical History:   Procedure Laterality Date    CIRCUMCISION N/A 6/16/2021    Procedure: CIRCUMCISION;  Surgeon: Milton Forrest MD;  Location: [facility] MAIN OR;  Service: Urology;  Laterality: N/A;    COLONOSCOPY  06/11/2015    NBIH, actuve colitis w/cryptitis, rare crypt abscess & superficial erosion, hyperplastic polyp    COLONOSCOPY N/A 8/8/2017    IH, nodular mucosa in the proximal rectum and in the distal rectum, Diverticulosis in the sigmoid colon, the descending colon and at the splenic flexure.  PATH: Hyperplastic polyps     COLONOSCOPY N/A 11/19/2019    Procedure: COLONOSCOPY to cecum with biopsies;  Surgeon: Calvin Gifford MD;  Location: [facility] ENDOSCOPY;  Service: Gastroenterology    EYE SURGERY      cataract    KIDNEY STONE SURGERY      TUMOR EXCISION      rectal tumor     Family History   Problem Relation Age of Onset    Hypertension Mother     Stroke Mother     Heart disease Mother     Asthma Sister     Ulcerative colitis Sister     Emphysema Sister     Lung disease Father         mesothelioma    Glaucoma Father     Diabetes Son     Malig Hyperthermia Neg Hx        Current Outpatient Medications:     albuterol sulfate  (90 Base) MCG/ACT inhaler, Inhale 2 puffs Every 4 (Four) Hours As Needed for Wheezing., Disp: 6.7 g, Rfl: 3    amLODIPine (NORVASC) 10 MG tablet, TAKE ONE TABLET BY MOUTH DAILY, Disp: 90 tablet, Rfl: 3    atenolol (TENORMIN) 50 MG tablet, TAKE 1 TABLET BY MOUTH DAILY, Disp: 90 tablet, Rfl: 1    atorvastatin (LIPITOR) 20 MG tablet, TAKE THREE TABLETS BY MOUTH DAILY, Disp: 270 tablet, Rfl: 3    Cholecalciferol (VITAMIN D3) 1000 UNITS capsule, Take 1 capsule by mouth Daily., Disp: , Rfl:     clopidogrel (PLAVIX) 75 MG tablet, Take 1 tablet by mouth Daily., Disp: 90 tablet, Rfl: 3    Cyanocobalamin (Vitamin B12) 1000 MCG  tablet controlled-release, Take 1,000 mcg by mouth Daily., Disp: , Rfl:     furosemide (Lasix) 40 MG tablet, Take 1 tablet by mouth Daily As Needed (swelling)., Disp: 90 tablet, Rfl: 3    lisinopril (PRINIVIL,ZESTRIL) 40 MG tablet, TAKE 1 TABLET BY MOUTH DAILY, Disp: 90 tablet, Rfl: 0    Omega-3 Fatty Acids (FISH OIL) 1200 MG capsule capsule, Take 1 capsule by mouth Daily With Breakfast. HOLD PER MD INSTR, Disp: , Rfl:     potassium chloride (KLOR-CON M20) 20 MEQ CR tablet, Take 1 tablet daily when taking furosemide (lasix), Disp: 90 tablet, Rfl: 3    sulfaSALAzine (AZULFIDINE) 500 MG tablet, TAKE TWO TABLETS BY MOUTH THREE TIMES A DAY, Disp: 180 tablet, Rfl: 11    triamcinolone (KENALOG) 0.025 % cream, Apply 1 Application topically to the appropriate area as directed 2 (Two) Times a Day., Disp: 80 g, Rfl: 3    vitamin C (ASCORBIC ACID) 500 MG tablet, Take 2 tablets by mouth Daily. HOLD PER MD INSTR, Disp: , Rfl:     zolpidem CR (AMBIEN CR) 6.25 MG CR tablet, Take 1 tablet by mouth At Night As Needed for Sleep., Disp: 90 tablet, Rfl: 1    Fluticasone-Umeclidin-Vilant (TRELEGY) 100-62.5-25 MCG/ACT inhaler, Inhale 1 puff Daily., Disp: 120 each, Rfl: 3  Allergies   Allergen Reactions    Ciprofloxacin Rash     Pt unsure reaction     Social History     Tobacco Use    Smoking status: Former     Current packs/day: 0.00     Average packs/day: 0.3 packs/day for 20.0 years (5.0 ttl pk-yrs)     Types: Cigarettes     Start date:      Quit date:      Years since quittin.8     Passive exposure: Past    Smokeless tobacco: Former   Vaping Use    Vaping status: Never Used   Substance Use Topics    Alcohol use: Yes     Alcohol/week: 2.0 standard drinks of alcohol     Types: 2 Shots of liquor per week     Comment: socially    Drug use: No          Objective   Physical Exam  Vitals:    10/15/24 0847   BP: 122/74   BP Location: Left arm   Patient Position: Sitting   Cuff Size: Large Adult   Pulse: 64   Temp: 97.3 °F (36.3  °C)   TempSrc: Infrared   SpO2: 97%   Weight: 120 kg (264 lb 12.8 oz)     Body mass index is 37.46 kg/m².    Constitutional: NAD.  Cardiovascular: RRR. No murmurs.  1+ edema of the lower legs bilaterally with mild venous stasis dermatitis right greater than left, no evidence of cellulitis.  Radial pulses 2+ bilaterally.  Pulmonary: CTA b/l. With coughing there is mild coarseness bilaterally  Psychiatric: Normal affect. Normal thought content.     Assessment & Plan   Assessment and Plan  Pleasant 84-year-old male with hypertension, hyperlipidemia, ulcerative colitis, CKD 3A, history of CVA, meningioma, osteopenia, history of gout, insomnia, among other problems, who presents with the following:      Diagnoses and all orders for this visit:    1. Venous stasis dermatitis (Primary): Recommend the addition of triamcinolone, compression socks for venous stasis dermatitis.  If not effective we should increase furosemide slightly.  Also recommend reducing salt in diet  -     triamcinolone (KENALOG) 0.025 % cream; Apply 1 Application topically to the appropriate area as directed 2 (Two) Times a Day.  Dispense: 80 g; Refill: 3    2. Moderate persistent asthma without complication: Reviewed his diagnosis of asthma, pulmonary function testing, recent CT.  Recommend he restart Trelegy and discussed risks and benefits of this medication  -     Fluticasone-Umeclidin-Vilant (TRELEGY) 100-62.5-25 MCG/ACT inhaler; Inhale 1 puff Daily.  Dispense: 120 each; Refill: 3    3. Need for pneumococcal vaccine  -     Pneumococcal Conjugate Vaccine 20-Valent (PCV20)    Ector Sykes MD  Family Medicine  O: [phone]    Disclaimer: Parts of this note were dictated by speech recognition. Minor errors in transcription may be present. Please call if questions.

## 2024-10-17 DIAGNOSIS — L03.115 CELLULITIS OF RIGHT LOWER EXTREMITY: ICD-10-CM

## 2024-10-17 RX ORDER — CEPHALEXIN 500 MG/1
CAPSULE ORAL
Qty: 20 CAPSULE | Refills: 0 | Status: SHIPPED | OUTPATIENT
Start: 2024-10-17

## 2024-10-22 ENCOUNTER — HOSPITAL ENCOUNTER (OUTPATIENT)
Dept: SLEEP MEDICINE | Facility: HOSPITAL | Age: 84
Discharge: HOME | End: 2024-10-22
Admitting: INTERNAL MEDICINE
Payer: MEDICARE

## 2024-10-22 DIAGNOSIS — R09.02 HYPOXIA: ICD-10-CM

## 2024-10-22 DIAGNOSIS — E66.811 OBESITY, CLASS I, BMI 30-34.9: ICD-10-CM

## 2024-10-22 DIAGNOSIS — R06.83 SNORING: ICD-10-CM

## 2024-10-22 PROCEDURE — 95811 POLYSOM 6/>YRS CPAP 4/> PARM: CPT

## 2024-10-25 ENCOUNTER — TELEPHONE (OUTPATIENT)
Dept: INTERNAL MEDICINE | Facility: CLINIC | Age: 84
End: 2024-10-25
Payer: MEDICARE

## 2024-10-25 NOTE — TELEPHONE ENCOUNTER
Caller: Bernard Resendez    Relationship: Self    Best call back number: [phone]      What was the call regarding: PATIENT WENT TO PHARMACY PICKED UP THE MEDICATION     cephalexin (KEFLEX) 500 MG capsule     PATIENT WOULD LIKE TO KNOW IF HE NEEDS TO CONTINUE TAKING THIS HE DOESN'T RECALL HAVING A CONVERSATION WITH DR CAMPBELL ABOUT NEEDING THIS MEDICATION     PATIENT ALSO  ASKED ABOUT THE VACCINES DR CAMPBELL  RECOMMENDED FOR PATIENT     PLEASE GIVE CALLBACK

## 2024-10-25 NOTE — TELEPHONE ENCOUNTER
BEATRIZ--Advised pt we had gotten a refill request for it.  He stated he didn't request a refill, so he wont take the Keflex for now.  Assumes mistake by pharmacy.  There is nothing in chart that says its needed.

## 2024-10-25 NOTE — TELEPHONE ENCOUNTER
He can take if leg is worsening with redness on the right side but otherwise I agree not to take  Pharmacy must have requested refill in error

## 2024-11-10 DIAGNOSIS — G47.34 SLEEP RELATED HYPOXIA: ICD-10-CM

## 2024-11-10 DIAGNOSIS — G47.33 OSA (OBSTRUCTIVE SLEEP APNEA): Primary | ICD-10-CM

## 2024-11-29 DIAGNOSIS — I10 ESSENTIAL HYPERTENSION: ICD-10-CM

## 2024-12-02 RX ORDER — ATENOLOL 50 MG/1
50 TABLET ORAL DAILY
Qty: 90 TABLET | Refills: 1 | Status: SHIPPED | OUTPATIENT
Start: 2024-12-02

## 2024-12-03 ENCOUNTER — OFFICE VISIT (OUTPATIENT)
Dept: INTERNAL MEDICINE | Facility: CLINIC | Age: 84
End: 2024-12-03
Payer: MEDICARE

## 2024-12-03 VITALS
SYSTOLIC BLOOD PRESSURE: 136 MMHG | WEIGHT: 264.2 LBS | BODY MASS INDEX: 36.99 KG/M2 | DIASTOLIC BLOOD PRESSURE: 72 MMHG | TEMPERATURE: 98.1 F | HEIGHT: 71 IN | HEART RATE: 68 BPM | OXYGEN SATURATION: 96 %

## 2024-12-03 DIAGNOSIS — M16.11 ARTHRITIS OF RIGHT HIP: Primary | ICD-10-CM

## 2024-12-03 PROBLEM — G47.33 OBSTRUCTIVE SLEEP APNEA SYNDROME: Status: ACTIVE | Noted: 2024-12-03

## 2024-12-03 PROCEDURE — 99214 OFFICE O/P EST MOD 30 MIN: CPT | Performed by: FAMILY MEDICINE

## 2024-12-03 PROCEDURE — 1125F AMNT PAIN NOTED PAIN PRSNT: CPT | Performed by: FAMILY MEDICINE

## 2024-12-03 RX ORDER — PREDNISONE 10 MG/1
TABLET ORAL
Qty: 28 TABLET | Refills: 0 | Status: SHIPPED | OUTPATIENT
Start: 2024-12-03 | End: 2024-12-09 | Stop reason: SDUPTHER

## 2024-12-03 NOTE — PROGRESS NOTES
Date of Encounter: 2024  Patient: Bernard Resendez,  1940    Subjective   History of Presenting Illness  Chief complaint: Right groin pain    3 weeks of achy right groin pain worsening at night and with excessive walking.  No known injury.  History of previous groin strain, but that resolved with observation and he does not feel this is improving.  No urinary changes.  No blood in his urine.  No abdominal pain or change in bowel habits.  No fever or chills.  No flank pain.  He is not taking any medication for this regularly.    The following portions of the patient's history were reviewed and updated as appropriate: allergies, current medications, past family history, past medical history, past social history, past surgical history and problem list.    Patient Active Problem List   Diagnosis    Stage 3a chronic kidney disease    Hyperlipidemia    Hypogonadism in male    Insomnia d/t anxiety    Osteopenia    Hypertension    Colon polyps    Ulcerative colitis without complications    Obesity, Class I, BMI 30-34.9    DNR (do not resuscitate)    History of gout    Meningioma    Cerebrovascular accident (CVA) of pontine structure    Mild cognitive impairment    Hand cramps    Lung nodules    Chronic pain of left knee    Obstructive sleep apnea syndrome     Past Medical History:   Diagnosis Date    Acute CVA (cerebrovascular accident) 2022    Acute renal failure     Cancer     SKIN LESION REMOVED    Diverticulosis     Gout 2016    Hearing difficulty     History of shingles     Hyperlipidemia     Hypertension     Hypogonadism in male     Insomnia     Meningioma 2022    Nephrolithiasis     Obesity, Class I, BMI 30-34.9 2021    Osteopenia     Osteopenia     Pruritus 01/15/2021    Renal insufficiency     Ulcerative colitis     Venous stasis dermatitis of both lower extremities 01/15/2021     Past Surgical History:   Procedure Laterality Date    CIRCUMCISION N/A 2021    Procedure:  CIRCUMCISION;  Surgeon: Milton Forrest MD;  Location: [facility] MAIN OR;  Service: Urology;  Laterality: N/A;    COLONOSCOPY  06/11/2015    NBIH, actuve colitis w/cryptitis, rare crypt abscess & superficial erosion, hyperplastic polyp    COLONOSCOPY N/A 8/8/2017    IH, nodular mucosa in the proximal rectum and in the distal rectum, Diverticulosis in the sigmoid colon, the descending colon and at the splenic flexure.  PATH: Hyperplastic polyps     COLONOSCOPY N/A 11/19/2019    Procedure: COLONOSCOPY to cecum with biopsies;  Surgeon: Calvin Gifford MD;  Location: [facility] ENDOSCOPY;  Service: Gastroenterology    EYE SURGERY      cataract    KIDNEY STONE SURGERY      TUMOR EXCISION      rectal tumor     Family History   Problem Relation Age of Onset    Hypertension Mother     Stroke Mother     Heart disease Mother     Asthma Sister     Ulcerative colitis Sister     Emphysema Sister     Lung disease Father         mesothelioma    Glaucoma Father     Diabetes Son     Malig Hyperthermia Neg Hx        Current Outpatient Medications:     amLODIPine (NORVASC) 10 MG tablet, TAKE ONE TABLET BY MOUTH DAILY, Disp: 90 tablet, Rfl: 3    atenolol (TENORMIN) 50 MG tablet, TAKE 1 TABLET BY MOUTH DAILY, Disp: 90 tablet, Rfl: 1    atorvastatin (LIPITOR) 20 MG tablet, TAKE THREE TABLETS BY MOUTH DAILY, Disp: 270 tablet, Rfl: 3    Cholecalciferol (VITAMIN D3) 1000 UNITS capsule, Take 1 capsule by mouth Daily., Disp: , Rfl:     clopidogrel (PLAVIX) 75 MG tablet, Take 1 tablet by mouth Daily., Disp: 90 tablet, Rfl: 3    Cyanocobalamin (Vitamin B12) 1000 MCG tablet controlled-release, Take 1,000 mcg by mouth Daily., Disp: , Rfl:     Fluticasone-Umeclidin-Vilant (TRELEGY) 100-62.5-25 MCG/ACT inhaler, Inhale 1 puff Daily., Disp: 120 each, Rfl: 3    lisinopril (PRINIVIL,ZESTRIL) 40 MG tablet, TAKE 1 TABLET BY MOUTH DAILY, Disp: 90 tablet, Rfl: 0    Omega-3 Fatty Acids (FISH OIL) 1200 MG capsule capsule, Take 1 capsule by mouth Daily With  "Breakfast. HOLD PER MD INSTR, Disp: , Rfl:     sulfaSALAzine (AZULFIDINE) 500 MG tablet, TAKE TWO TABLETS BY MOUTH THREE TIMES A DAY, Disp: 180 tablet, Rfl: 11    vitamin C (ASCORBIC ACID) 500 MG tablet, Take 2 tablets by mouth Daily. HOLD PER MD INSTR, Disp: , Rfl:     zolpidem CR (AMBIEN CR) 6.25 MG CR tablet, Take 1 tablet by mouth At Night As Needed for Sleep., Disp: 90 tablet, Rfl: 1    albuterol sulfate  (90 Base) MCG/ACT inhaler, Inhale 2 puffs Every 4 (Four) Hours As Needed for Wheezing., Disp: 6.7 g, Rfl: 3    furosemide (Lasix) 40 MG tablet, Take 1 tablet by mouth Daily As Needed (swelling). (Patient not taking: Reported on 12/3/2024), Disp: 90 tablet, Rfl: 3    potassium chloride (KLOR-CON M20) 20 MEQ CR tablet, Take 1 tablet daily when taking furosemide (lasix) (Patient not taking: Reported on 12/3/2024), Disp: 90 tablet, Rfl: 3    predniSONE (DELTASONE) 10 MG tablet, Take 4 tablets by mouth Daily for 4 days, THEN 2 tablets Daily for 4 days, THEN 1 tablet Daily for 4 days., Disp: 28 tablet, Rfl: 0  Allergies   Allergen Reactions    Ciprofloxacin Rash     Pt unsure reaction     Social History     Tobacco Use    Smoking status: Former     Current packs/day: 0.00     Average packs/day: 0.3 packs/day for 20.0 years (5.0 ttl pk-yrs)     Types: Cigarettes     Start date:      Quit date:      Years since quittin.9     Passive exposure: Past    Smokeless tobacco: Former   Vaping Use    Vaping status: Never Used   Substance Use Topics    Alcohol use: Yes     Alcohol/week: 2.0 standard drinks of alcohol     Types: 2 Shots of liquor per week     Comment: socially    Drug use: No          Objective   Physical Exam  Vitals:    24 1332   BP: 136/72   BP Location: Left arm   Patient Position: Sitting   Cuff Size: Adult   Pulse: 68   Temp: 98.1 °F (36.7 °C)   TempSrc: Infrared   SpO2: 96%   Weight: 120 kg (264 lb 3.2 oz)   Height: 179.1 cm (70.5\")     Body mass index is 37.37 " kg/m².    Constitutional: NAD.  Psychiatric: Normal affect. Normal thought content.  Genitourinary: Mildly edematous scrotum bilaterally, but no palpable hernia in the right groin.  Examination by palpation of his right groin, upper thigh and right lower abdomen does not reproduce his pain.  Musculoskeletal: Range of motion in right hip is limited in anterior flexion, JONE and FADIR test, and FADIR test does reproduce his pain     Assessment & Plan   Assessment and Plan  Pleasant 84-year-old male with hypertension, hyperlipidemia, ulcerative colitis, CKD 3A, history of CVA, meningioma, osteopenia, history of gout, insomnia, among other problems, who presents with the following:      Diagnoses and all orders for this visit:    1. Arthritis of right hip (Primary)  -     predniSONE (DELTASONE) 10 MG tablet; Take 4 tablets by mouth Daily for 4 days, THEN 2 tablets Daily for 4 days, THEN 1 tablet Daily for 4 days.  Dispense: 28 tablet; Refill: 0  -     XR Hips Bilateral With or Without Pelvis 2 View; Future    He had an x-ray of both hips in 2019 by Dr. 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  He does not recall much about this encounter.  His examination is suggestive of possible right hip arthritis as a etiology. since he is only 3 weeks and I recommend a trial of corticosteroids, ice, and improved footwear in the house.  If his symptoms improve nothing else needs to be done.  If not improving will get hip x-ray, and if worsening and not concordant with hip arthritis will consider evaluation of his urine plus or minus abdominal imaging in context.    Ector Sykes MD  Family Medicine  O: [phone]    Disclaimer: Parts of this note were dictated by speech recognition. Minor errors in transcription may be present. Please call if questions.

## 2024-12-06 DIAGNOSIS — E78.5 HYPERLIPIDEMIA, UNSPECIFIED HYPERLIPIDEMIA TYPE: ICD-10-CM

## 2024-12-06 DIAGNOSIS — I10 ESSENTIAL HYPERTENSION: ICD-10-CM

## 2024-12-09 ENCOUNTER — TELEPHONE (OUTPATIENT)
Dept: INTERNAL MEDICINE | Facility: CLINIC | Age: 84
End: 2024-12-09
Payer: MEDICARE

## 2024-12-09 DIAGNOSIS — M16.11 ARTHRITIS OF RIGHT HIP: ICD-10-CM

## 2024-12-09 RX ORDER — LISINOPRIL 40 MG/1
40 TABLET ORAL DAILY
Qty: 90 TABLET | Refills: 3 | Status: SHIPPED | OUTPATIENT
Start: 2024-12-09

## 2024-12-09 RX ORDER — PREDNISONE 10 MG/1
TABLET ORAL
Qty: 28 TABLET | Refills: 0 | Status: SHIPPED | OUTPATIENT
Start: 2024-12-09 | End: 2024-12-20

## 2024-12-09 RX ORDER — ATORVASTATIN CALCIUM 20 MG/1
60 TABLET, FILM COATED ORAL DAILY
Qty: 90 TABLET | Refills: 3 | Status: SHIPPED | OUTPATIENT
Start: 2024-12-09

## 2024-12-09 NOTE — TELEPHONE ENCOUNTER
Caller: Bernard Resendez    Relationship: Self    Best call back number: [phone]     Who are you requesting to speak with (clinical staff, provider,  specific staff member): CLINICAL STAFF      What was the call regarding: GROIN PAIN  A LITTLE BETTER.  SHOULD HE GET MORE PREDNISONE.  PATIENT HAS A FEW DAYS LEFT. SHOULD HE BE SEEN FOR THE XRAY AS DISCUSSED IN LAST OFFICE VISIT. PLEASE CALL TO ADVISE      PHARMACY: [facility] [id number] [place], [place] - [address] - [phone] [facility] [phone]  [phone]

## 2024-12-10 DIAGNOSIS — G47.00 INSOMNIA, UNSPECIFIED TYPE: ICD-10-CM

## 2024-12-10 NOTE — TELEPHONE ENCOUNTER
He can do another round of prednisone but look out for swelling and excessive weight gain, I will send in another rx    He has an order for Xrays already in the system

## 2024-12-10 NOTE — TELEPHONE ENCOUNTER
Rx Refill Note  Requested Prescriptions     Pending Prescriptions Disp Refills    zolpidem CR (AMBIEN CR) 6.25 MG CR tablet [Pharmacy Med Name: ZOLPIDEM TART ER 6.25 MG TAB] 90 tablet      Sig: TAKE ONE TABLET BY MOUTH AT NIGHT AS NEEDED FOR SLEEP      Last office visit with prescribing clinician: 12/3/2024   Last telemedicine visit with prescribing clinician: Visit date not found   Next office visit with prescribing clinician: Visit date not found          UDS NEEDS UPDATED    Charissa Medrano LPN  12/10/24, 11:25 EST

## 2024-12-11 RX ORDER — ZOLPIDEM TARTRATE 6.25 MG/1
6.25 TABLET, FILM COATED, EXTENDED RELEASE ORAL NIGHTLY PRN
Qty: 90 TABLET | Refills: 1 | Status: SHIPPED | OUTPATIENT
Start: 2024-12-15

## 2024-12-12 RX ORDER — SULFASALAZINE 500 MG/1
1000 TABLET ORAL 3 TIMES DAILY
Qty: 180 TABLET | Refills: 11 | OUTPATIENT
Start: 2024-12-12

## 2024-12-16 NOTE — TELEPHONE ENCOUNTER
Caller: MarinohenryBernard    Relationship: Self    Best call back number: [phone]    Requested Prescriptions:   Requested Prescriptions     Pending Prescriptions Disp Refills    sulfaSALAzine (AZULFIDINE) 500 MG tablet 180 tablet 11     Sig: Take 2 tablets by mouth 3 (Three) Times a Day.        Pharmacy where request should be sent:      Last office visit with prescribing clinician: Visit date not found   Last telemedicine visit with prescribing clinician: Visit date not found   Next office visit with prescribing clinician:   01/20/2025    Additional details provided by patient: CAN PT HAVE ONE REFILL TO GET HIM BY UNTIL HIS APPT ON 01/20/25    Does the patient have less than a 3 day supply:  [] Yes  [x] No    Would you like a call back once the refill request has been completed: [] Yes [x] No    If the office needs to give you a call back, can they leave a voicemail: [x] Yes [] No

## 2024-12-17 RX ORDER — SULFASALAZINE 500 MG/1
1000 TABLET ORAL 3 TIMES DAILY
Qty: 180 TABLET | Refills: 5 | Status: SHIPPED | OUTPATIENT
Start: 2024-12-17

## 2024-12-18 ENCOUNTER — TELEPHONE (OUTPATIENT)
Dept: INTERNAL MEDICINE | Facility: CLINIC | Age: 84
End: 2024-12-18

## 2024-12-18 NOTE — TELEPHONE ENCOUNTER
LMOM per Dr Sykes, He needs to get the x ray of his hip to know whether this is the case    I do not usually see varicose veins causing pain in his distribution, although it is possible      HUB CAN RELAY

## 2024-12-18 NOTE — TELEPHONE ENCOUNTER
He needs to get the x ray of his hip to know whether this is the case    I do not usually see varicose veins causing pain in his distribution, although it is possible

## 2024-12-18 NOTE — TELEPHONE ENCOUNTER
Caller: Bernard Resendez    Relationship to patient: Self    Best call back number: [phone]    Patient is needing: PATIENT THINKS THAT WHAT HE HAS GOING ON FROM HIS GROIN TO HIS RIGHT LEG IS VERACITIES. PLEASE REACH OUT TO ADVISE.

## 2024-12-19 ENCOUNTER — HOSPITAL ENCOUNTER (OUTPATIENT)
Dept: GENERAL RADIOLOGY | Facility: HOSPITAL | Age: 84
Discharge: HOME | End: 2024-12-19
Admitting: FAMILY MEDICINE
Payer: MEDICARE

## 2024-12-19 DIAGNOSIS — M16.11 ARTHRITIS OF RIGHT HIP: ICD-10-CM

## 2024-12-19 PROCEDURE — 73521 X-RAY EXAM HIPS BI 2 VIEWS: CPT

## 2024-12-20 ENCOUNTER — TELEPHONE (OUTPATIENT)
Dept: INTERNAL MEDICINE | Facility: CLINIC | Age: 84
End: 2024-12-20
Payer: MEDICARE

## 2024-12-20 DIAGNOSIS — M16.11 PRIMARY OSTEOARTHRITIS OF RIGHT HIP: Primary | ICD-10-CM

## 2024-12-20 RX ORDER — HYDROCODONE BITARTRATE AND ACETAMINOPHEN 5; 325 MG/1; MG/1
1 TABLET ORAL EVERY 12 HOURS PRN
Qty: 30 TABLET | Refills: 0 | Status: SHIPPED | OUTPATIENT
Start: 2024-12-20

## 2024-12-20 NOTE — TELEPHONE ENCOUNTER
I do not have a final read from the radiologist    However my impression is this is severe right hip arthritis.  His right hip is much worse than his left hip.    I am going to refer him to an orthopedist for consideration of an injection in his hip to help with the symptoms    In the meantime, I am also going to send him a prescription for hydrocodone to use as needed for severe pain while awaiting this appointment    Make sure that he wears tennis shoes regularly    He can take Tylenol as needed as well

## 2024-12-20 NOTE — TELEPHONE ENCOUNTER
Caller: Bernard Resendez    Relationship: Self    Best call back number: [phone]     Caller requesting test results: XRAY    When was the test performed: 12.20.24        Additional notes: PATIENT WAS CALLING TO CHECK THE NEXT STEPS PATIENT IS STILL IN PAIN, AND IS GOING TO [place] ON SUNDAY.    PLEASE CALL TO ADVISE WHAT THE NEXT STEPS ARE.

## 2024-12-27 ENCOUNTER — TELEPHONE (OUTPATIENT)
Dept: INTERNAL MEDICINE | Facility: CLINIC | Age: 84
End: 2024-12-27

## 2024-12-27 NOTE — TELEPHONE ENCOUNTER
Caller: Bernard Resendez    Relationship: Self    Best call back number: [phone]     What is the medical concern/diagnosis: HIP ISSUES    What is the provider, practice or medical service name:     What is the office location:     What is the office phone number: [phone]    Any additional details: WHERE PATIENT WAS REFERRED TO ORIGINALLY THEY CAN'T GET HIM IN UNTIL 01.07.25, AND PATIENT ALSO WANTED TO SEE THE DR LISTED ABOVE BECAUSE HIS WIFE SEE'S THAT DOCTOR AS WELL.     PATIENT IS HAVING REALLY BAD PAIN FOR SEVERAL WEEKS AND NEEDING TO BE SEEN ASAP.

## 2025-01-07 ENCOUNTER — OFFICE VISIT (OUTPATIENT)
Dept: ORTHOPEDIC SURGERY | Facility: CLINIC | Age: 85
End: 2025-01-07
Payer: MEDICARE

## 2025-01-07 VITALS — HEIGHT: 69 IN | TEMPERATURE: 98.6 F | WEIGHT: 265 LBS | BODY MASS INDEX: 39.25 KG/M2

## 2025-01-07 DIAGNOSIS — M70.61 GREATER TROCHANTERIC BURSITIS OF RIGHT HIP: ICD-10-CM

## 2025-01-07 DIAGNOSIS — M16.11 PRIMARY OSTEOARTHRITIS OF RIGHT HIP: ICD-10-CM

## 2025-01-07 DIAGNOSIS — M25.551 RIGHT HIP PAIN: Primary | ICD-10-CM

## 2025-01-07 NOTE — PROGRESS NOTES
Patient Name: Bernard Resendez   YOB: 1940  Referring Primary Care Physician: Ector Sykes MD  BMI: Body mass index is 39.13 kg/m².    Chief Complaint:    Chief Complaint   Patient presents with    Right Hip - Pain        HPI: New patient to me that presents with hip pain was referred by his family practice physician who got x-rays and was told he had osteoarthritis in his hip.  Patient complains of right hip that is hurting in his groin has been waxing and waning for several months but has been worse since a recent trip to [facility].  Patient also has lateral hip pain denies any history of back problems he ambulates without any assistive devices wife is at the bedside and his grandson who is in physical therapy school.    Bernard Resendez is a 84 y.o. male who presents today for evaluation of   Chief Complaint   Patient presents with    Right Hip - Pain         Subjective   Medications:   Home Medications:  Current Outpatient Medications on File Prior to Visit   Medication Sig    amLODIPine (NORVASC) 10 MG tablet TAKE ONE TABLET BY MOUTH DAILY    atenolol (TENORMIN) 50 MG tablet TAKE 1 TABLET BY MOUTH DAILY    atorvastatin (LIPITOR) 20 MG tablet TAKE THREE TABLETS BY MOUTH DAILY    Cholecalciferol (VITAMIN D3) 1000 UNITS capsule Take 1 capsule by mouth Daily.    clopidogrel (PLAVIX) 75 MG tablet Take 1 tablet by mouth Daily.    Cyanocobalamin (Vitamin B12) 1000 MCG tablet controlled-release Take 1,000 mcg by mouth Daily.    lisinopril (PRINIVIL,ZESTRIL) 40 MG tablet TAKE 1 TABLET BY MOUTH DAILY    Omega-3 Fatty Acids (FISH OIL) 1200 MG capsule capsule Take 1 capsule by mouth Daily With Breakfast. HOLD PER MD INSTR    sulfaSALAzine (AZULFIDINE) 500 MG tablet Take 2 tablets by mouth 3 (Three) Times a Day.    zolpidem CR (AMBIEN CR) 6.25 MG CR tablet Take 1 tablet by mouth At Night As Needed for Sleep.    albuterol sulfate  (90 Base) MCG/ACT inhaler Inhale 2 puffs Every 4 (Four)  Hours As Needed for Wheezing. (Patient not taking: Reported on 1/7/2025)    Fluticasone-Umeclidin-Vilant (TRELEGY) 100-62.5-25 MCG/ACT inhaler Inhale 1 puff Daily. (Patient not taking: Reported on 1/7/2025)    furosemide (Lasix) 40 MG tablet Take 1 tablet by mouth Daily As Needed (swelling). (Patient not taking: Reported on 1/7/2025)    HYDROcodone-acetaminophen (NORCO) 5-325 MG per tablet Take 1 tablet by mouth Every 12 (Twelve) Hours As Needed for Severe Pain. (Patient not taking: Reported on 1/7/2025)    potassium chloride (KLOR-CON M20) 20 MEQ CR tablet Take 1 tablet daily when taking furosemide (lasix) (Patient not taking: Reported on 1/7/2025)    vitamin C (ASCORBIC ACID) 500 MG tablet Take 2 tablets by mouth Daily. HOLD PER MD INSTR (Patient not taking: Reported on 1/7/2025)     No current facility-administered medications on file prior to visit.     Current Medications:  Scheduled Meds:  Continuous Infusions:No current facility-administered medications for this visit.    PRN Meds:.    I have reviewed the patient's medical history in detail and updated the computerized patient record.  Review and summarization of old records includes:    Past Medical History:   Diagnosis Date    Acute CVA (cerebrovascular accident) 01/27/2022    Acute renal failure     Cancer     SKIN LESION REMOVED    Diverticulosis     Gout 02/13/2016    Hearing difficulty     History of shingles     Hyperlipidemia     Hypertension     Hypogonadism in male     Insomnia     Meningioma 01/27/2022    Nephrolithiasis     Obesity, Class I, BMI 30-34.9 03/30/2021    Osteopenia     Osteopenia     Pruritus 01/15/2021    Renal insufficiency     Ulcerative colitis     Venous stasis dermatitis of both lower extremities 01/15/2021        Past Surgical History:   Procedure Laterality Date    CIRCUMCISION N/A 6/16/2021    Procedure: CIRCUMCISION;  Surgeon: Milton Forrest MD;  Location: [facility]  Service: Urology;  Laterality: N/A;     COLONOSCOPY  2015    NBIH, actuve colitis w/cryptitis, rare crypt abscess & superficial erosion, hyperplastic polyp    COLONOSCOPY N/A 2017    IH, nodular mucosa in the proximal rectum and in the distal rectum, Diverticulosis in the sigmoid colon, the descending colon and at the splenic flexure.  PATH: Hyperplastic polyps     COLONOSCOPY N/A 2019    Procedure: COLONOSCOPY to cecum with biopsies;  Surgeon: Calvin Gifford MD;  Location: [facility] ENDOSCOPY;  Service: Gastroenterology    EYE SURGERY      cataract    KIDNEY STONE SURGERY      TUMOR EXCISION      rectal tumor        Social History     Occupational History    Occupation: Retired   Tobacco Use    Smoking status: Former     Current packs/day: 0.00     Average packs/day: 0.3 packs/day for 20.0 years (5.0 ttl pk-yrs)     Types: Cigarettes     Start date:      Quit date:      Years since quittin.0     Passive exposure: Past    Smokeless tobacco: Former   Vaping Use    Vaping status: Never Used   Substance and Sexual Activity    Alcohol use: Yes     Alcohol/week: 2.0 standard drinks of alcohol     Types: 2 Shots of liquor per week     Comment: socially    Drug use: No    Sexual activity: Defer      Social History     Social History Narrative    Not on file        Family History   Problem Relation Age of Onset    Hypertension Mother     Stroke Mother     Heart disease Mother     Asthma Sister     Ulcerative colitis Sister     Emphysema Sister     Lung disease Father         mesothelioma    Glaucoma Father     Diabetes Son     Malig Hyperthermia Neg Hx        ROS: 14 point review of systems was performed and all other systems were reviewed and are negative except for documented findings in HPI and today's encounter.     Allergies:   Allergies   Allergen Reactions    Ciprofloxacin Rash     Pt unsure reaction     Constitutional:  Denies fever, shaking or chills   Eyes:  Denies change in visual acuity   HENT:  Denies nasal congestion or  "sore throat   Respiratory:  Denies cough or shortness of breath   Cardiovascular:  Denies chest pain or severe LE edema   GI:  Denies abdominal pain, nausea, vomiting, bloody stools or diarrhea   Musculoskeletal:  Numbness, tingling, pain, or loss of motor function only as noted above in history of present illness.  : Denies painful urination or hematuria  Integument:  Denies rash, lesion or ulceration   Neurologic:  Denies headache or focal weakness  Endocrine:  Denies lymphadenopathy  Psych:  Denies confusion or change in mental status   Hem:  Denies active bleeding    OBJECTIVE:  Physical Exam: 84 y.o. male  Wt Readings from Last 3 Encounters:   01/07/25 120 kg (265 lb)   12/03/24 120 kg (264 lb 3.2 oz)   10/15/24 120 kg (264 lb 12.8 oz)     Ht Readings from Last 1 Encounters:   01/07/25 175.3 cm (69\")     Body mass index is 39.13 kg/m².  Vitals:    01/07/25 1513   Temp: 98.6 °F (37 °C)     Vital signs reviewed.     General Appearance:    Alert, cooperative, in no acute distress                  Eyes: conjunctiva clear  ENT: external ears and nose atraumatic  CV: no peripheral edema  Resp: normal respiratory effort  Skin: no rashes or wounds; normal turgor  Psych: mood and affect appropriate  Lymph: no nodes appreciated  Neuro: gross sensation intact  Vascular:  Palpable peripheral pulse in noted extremity  Musculoskeletal Extremities: Skin is warm dry and intact with good pulses movement sensation he ambulates without assist devices calves are soft and nontender he has point tenderness over the greater trochanter bursa internal rotation of the hip elicits pain straight leg raise is positive pain is diffuse in the SI joint extending to the lateral hip also in the groin    Radiology:   Study Result    Narrative & Impression   XR HIPS BILATERAL W OR WO PELVIS 2 VIEW-     CLINICAL INFORMATION: Hip arthritis.     FINDINGS: Symmetric moderate hip joint narrowing. No acute osseous  abnormality, bone lesion or " avascular necrosis. Surrounding soft tissues  have a satisfactory appearance and the remainder is unremarkable.     This report was finalized on 12/21/2024 8:07 AM by Dr. Kodi Menendez M.D on Workstation: [id number]        Lumbar spine 3 views done today show degenerative lumbar spine arthritis with facet arthropathy  Assessment:     ICD-10-CM ICD-9-CM   1. Right hip pain  M25.551 719.45   2. Greater trochanteric bursitis of right hip  M70.61 726.5   3. Primary osteoarthritis of right hip  M16.11 715.15        Large Joint Arthrocentesis: R greater trochanteric bursa  Date/Time: 1/8/2025 1:07 PM  Consent given by: patient  Site marked: site marked  Timeout: Immediately prior to procedure a time out was called to verify the correct patient, procedure, equipment, support staff and site/side marked as required   Supporting Documentation  Indications: pain   Procedure Details  Location: hip - R greater trochanteric bursa  Preparation: Patient was prepped and draped in the usual sterile fashion  Needle gauge: 21 G.  Approach: lateral  Medications administered: 2 mL lidocaine (cardiac); 80 mg methylPREDNISolone acetate 80 MG/ML  Patient tolerance: patient tolerated the procedure well with no immediate complications      Patient has complex history with he has obvious osteoarthritis in the hip knee also has pathology his lower lumbar spine exam today points to his hip we will try greater trochanter bursa injection get him in some physical therapy and have him follow-up with Dr. Read and may need to see Patrizia Koenig as well  MDM/Plan:   The diagnosis(es), natural history, pathophysiology and treatment for diagnosis(es) were discussed. Opportunity given and questions answered.  Biomechanics of pertinent body areas discussed.  When appropriate, the use of ambulatory aids discussed.  BMI:  The concept of BMI body mass index and its importance and implications discussed.    EXERCISES:  Advice on benefits of, and types of  regular/moderate exercise pertaining to orthopedic diagnosis(es).  MEDICATIONS:  The risks, benefits, warnings,side effects and alternatives of medications discussed.  Inflammation/pain control; with cold, heat, elevation and/or liniments discussed as appropriate  Cortisone Injection. See procedure note.  PT referral.  HOME EXERCISE/PT program encouraged  SPECIALTY REFERRAL  MEDICAL RECORDS reviewed from other provider(s) for past and current medical history pertinent to this complaint.      1/7/2025    Much of this encounter note is an electronic transcription/translation of spoken language to printed text. The electronic translation of spoken language may permit erroneous, or at times, nonsensical words or phrases to be inadvertently transcribed; Although I have reviewed the note for such errors, some may still exist

## 2025-01-08 ENCOUNTER — TELEPHONE (OUTPATIENT)
Dept: ORTHOPEDIC SURGERY | Facility: CLINIC | Age: 85
End: 2025-01-08
Payer: MEDICARE

## 2025-01-08 RX ORDER — METHYLPREDNISOLONE ACETATE 80 MG/ML
80 INJECTION, SUSPENSION INTRA-ARTICULAR; INTRALESIONAL; INTRAMUSCULAR; SOFT TISSUE
Status: COMPLETED | OUTPATIENT
Start: 2025-01-08 | End: 2025-01-08

## 2025-01-08 RX ADMIN — METHYLPREDNISOLONE ACETATE 80 MG: 80 INJECTION, SUSPENSION INTRA-ARTICULAR; INTRALESIONAL; INTRAMUSCULAR; SOFT TISSUE at 13:07

## 2025-01-08 NOTE — TELEPHONE ENCOUNTER
Pt informed the injection could take up to 2 weeks to fully work; he voiced understanding and will follow up with TJS as scheduled.

## 2025-01-08 NOTE — TELEPHONE ENCOUNTER
Hub staff attempted to follow warm transfer process and was unsuccessful     Caller: Bernard Resendez    Relationship to patient: Self    Best call back number: [phone]    Patient is needing: PT RETURNING RIAN ROMERO'S CALL REGARDING PREV R HIP INJECTION. PLEASE CONTACT PT TO RELAY MESSAGE FROM RIAN ROMERO.

## 2025-01-08 NOTE — TELEPHONE ENCOUNTER
Caller: Bernard Resendez    Relationship: Self    Best call back number: [phone]    Who are you requesting to speak with (clinical staff, provider,  specific staff member): RIAN ROMERO    What was the call regarding: PT HAD R HIP MYRNA INJ ON 01/07/25. PT STATES HE HAS HAD LITTLE TO NO RELIEF SO FAR FROM THAT INJ. PT WOULD LIKE TO DISCUSS HOW LONG HE MAY NEED TO WAIT TO START TO GET RELIEF FROM THE INJ. PLEASE CONTACT PT TO DISCUSS.

## 2025-01-28 ENCOUNTER — TELEPHONE (OUTPATIENT)
Dept: INTERNAL MEDICINE | Facility: CLINIC | Age: 85
End: 2025-01-28
Payer: MEDICARE

## 2025-01-28 DIAGNOSIS — R25.2 HAND CRAMPS: ICD-10-CM

## 2025-01-28 RX ORDER — BACLOFEN 10 MG/1
10 TABLET ORAL 3 TIMES DAILY PRN
Qty: 60 TABLET | Refills: 1 | OUTPATIENT
Start: 2025-01-28

## 2025-01-29 NOTE — TELEPHONE ENCOUNTER
The patient called to recheck on this medication. I told him that it was denied because it had not been refilled since 2023. He asked for a new prescription.

## 2025-01-31 RX ORDER — BACLOFEN 10 MG/1
10 TABLET ORAL 2 TIMES DAILY PRN
Qty: 60 TABLET | Refills: 3 | Status: SHIPPED | OUTPATIENT
Start: 2025-01-31

## 2025-02-07 ENCOUNTER — TELEPHONE (OUTPATIENT)
Dept: NEUROLOGY | Facility: CLINIC | Age: 85
End: 2025-02-07
Payer: MEDICARE

## 2025-02-07 NOTE — TELEPHONE ENCOUNTER
Spoke with patient to offer a sooner appointment with Dr. Riddle.  Left on wait list as he is unable to do the currently open dates and times.

## 2025-02-19 DIAGNOSIS — Z86.73 CHRONIC ARTERIAL ISCHEMIC STROKE: ICD-10-CM

## 2025-02-19 DIAGNOSIS — I67.2 INTRACRANIAL ATHEROSCLEROSIS: ICD-10-CM

## 2025-02-19 RX ORDER — CLOPIDOGREL BISULFATE 75 MG/1
75 TABLET ORAL DAILY
Qty: 90 TABLET | Refills: 0 | Status: SHIPPED | OUTPATIENT
Start: 2025-02-19

## 2025-02-24 NOTE — PROGRESS NOTES
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  He continues on Plavix monotherapy for basilar stenosis and additional intracranial narrowing from atherosclerosis.  He has a right frontal meningioma which was stable when last checked.  Dr. Rivera had not recommended additional long-term follow-up given the patient's age and low risk nature of the tumor.    Since his last visit he has continued to do well.  He denies any new signs or symptoms of stroke.  He continues on Plavix and 20 mg of atorvastatin.    He complains of new balance impairment in particular when attempting to stand still.  He also endorses some associated bilateral kinetic hand tremor.  He thinks his mother had a similar condition.    Objective:  Vital signs: /80   Pulse 78   Ht 175.3 cm (69\")   Wt 119 kg (262 lb)   SpO2 96%   BMI 38.69 kg/m²    Exam:  vitals reviewed  MS: oriented x3, recent/remote memory intact, normal attention/concentration, language intact, no neglect.  CN: visual acuity grossly normal, PERRL, EOMI, no facial droop, no dysarthria  Motor: 5/5 throughout upper and lower extremities, normal tone, bilateral postural and kinetic tremor, moderate amplitude, high-frequency, intentional component  Sensory: Mildly diminished cold temperature and vibratory sensation distal lower extremities  Reflexes: Absent bilateral lower extremities  Gait: Normal station, no ataxia, positive Romberg    Laboratory results:  Lab Results   Component Value Date    LDL 88 2023     (H) 11/10/2022     (H) 05/10/2022        Neuropathy labs ordered for today including ESR, B12, SPEP    Review and interpretation of imaging: Follow-up MRI " brain with and without contrast ordered for meningioma surveillance    Diagnoses:  Chronic stroke  Intracranial sclerosis, 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  I would recommend he continue on Plavix and Lipitor long-term.  I asked him to monitor his blood pressure more closely at home.    For his meningioma although this is highly unlikely to need surgery I would like to follow it every few years.  I will order a follow-up brain MRI.  He does not need to see neurosurgery anymore unless there is growth or become symptomatic.    For his ataxia I suspect this is on the basis of essential tremor.  Given some of his other exam findings I will draw lab work for reversible causes of neuropathy.  If his ataxia progresses out of proportion to the tremor we can consider a nerve conduction study.    Plan:  1.  Continue Plavix, atorvastatin  2.  Follow-up brain MRI for meningioma  3.  Neuropathy  labs drawn today as above  4.  We discussed medication options for benign essential tremor.  He would prefer to hold on this for now and I agree; I suspect the side effects would be problematic given his age.    I spent a total of 35 minutes on this clinical encounter including chart/records review, review of laboratory data, personal review of imaging studies, patient counseling, and care coordination.    Follow-up with me in 6 months.  Following longitudinally for secondary stroke prevention, essential tremor.

## 2025-02-26 ENCOUNTER — OFFICE VISIT (OUTPATIENT)
Dept: NEUROLOGY | Facility: CLINIC | Age: 85
End: 2025-02-26
Payer: MEDICARE

## 2025-02-26 VITALS
HEIGHT: 69 IN | BODY MASS INDEX: 38.8 KG/M2 | SYSTOLIC BLOOD PRESSURE: 130 MMHG | OXYGEN SATURATION: 96 % | DIASTOLIC BLOOD PRESSURE: 80 MMHG | WEIGHT: 262 LBS | HEART RATE: 78 BPM

## 2025-02-26 DIAGNOSIS — I67.2 INTRACRANIAL ATHEROSCLEROSIS: Primary | ICD-10-CM

## 2025-02-26 DIAGNOSIS — G25.0 ESSENTIAL TREMOR: ICD-10-CM

## 2025-02-26 DIAGNOSIS — Z86.73 CHRONIC ARTERIAL ISCHEMIC STROKE: ICD-10-CM

## 2025-02-26 DIAGNOSIS — G60.9 IDIOPATHIC PERIPHERAL NEUROPATHY: ICD-10-CM

## 2025-02-26 DIAGNOSIS — D32.0 MENINGIOMA, CEREBRAL: ICD-10-CM

## 2025-02-27 ENCOUNTER — TELEPHONE (OUTPATIENT)
Dept: INTERNAL MEDICINE | Facility: CLINIC | Age: 85
End: 2025-02-27
Payer: MEDICARE

## 2025-02-27 LAB
ALBUMIN SERPL ELPH-MCNC: 3.2 G/DL (ref 2.9–4.4)
ALBUMIN/GLOB SERPL: 1.1 {RATIO} (ref 0.7–1.7)
ALPHA1 GLOB SERPL ELPH-MCNC: 0.3 G/DL (ref 0–0.4)
ALPHA2 GLOB SERPL ELPH-MCNC: 0.8 G/DL (ref 0.4–1)
B-GLOBULIN SERPL ELPH-MCNC: 1 G/DL (ref 0.7–1.3)
ERYTHROCYTE [SEDIMENTATION RATE] IN BLOOD BY WESTERGREN METHOD: 17 MM/HR (ref 0–30)
GAMMA GLOB SERPL ELPH-MCNC: 0.7 G/DL (ref 0.4–1.8)
GLOBULIN SER CALC-MCNC: 2.8 G/DL (ref 2.2–3.9)
LABORATORY COMMENT REPORT: NORMAL
M PROTEIN SERPL ELPH-MCNC: NORMAL G/DL
PROT PATTERN SERPL ELPH-IMP: NORMAL
PROT SERPL-MCNC: 6 G/DL (ref 6–8.5)
VIT B12 SERPL-MCNC: 999 PG/ML (ref 232–1245)

## 2025-02-27 NOTE — TELEPHONE ENCOUNTER
Caller: Bernard Resendez    Relationship to patient: Self    Best call back number: [phone]      Patient is needing:     PATIENT CALLING IN WITH SOME QUESTIONS FOR .     PATIENT STATES AMLODIPINE HAS BEEN CAUSING HIM EDEMA.     PATIENT WOULD LIKE AN ALTERNATIVE THAT DOESN'T NOT CAUSE HIM THE EDEMA.     PHARMACY- [facility] [id number] - [facility] [address] [address] AT [facility] [place] - [phone] [facility] [phone] FX     PATIENT WAS SUGGESTED TO SEE DR. NOHELIA HAIR AND WOULD LIKE 'S PERSPECTIVE ON THAT.     PLEASE CALL PATIENT TO CONFIRM.

## 2025-03-06 ENCOUNTER — OFFICE VISIT (OUTPATIENT)
Dept: INTERNAL MEDICINE | Facility: CLINIC | Age: 85
End: 2025-03-06
Payer: MEDICARE

## 2025-03-06 VITALS
BODY MASS INDEX: 39.1 KG/M2 | SYSTOLIC BLOOD PRESSURE: 124 MMHG | OXYGEN SATURATION: 97 % | HEIGHT: 69 IN | HEART RATE: 68 BPM | WEIGHT: 264 LBS | DIASTOLIC BLOOD PRESSURE: 68 MMHG | TEMPERATURE: 98 F

## 2025-03-06 DIAGNOSIS — I10 PRIMARY HYPERTENSION: Primary | ICD-10-CM

## 2025-03-06 PROCEDURE — 1125F AMNT PAIN NOTED PAIN PRSNT: CPT | Performed by: FAMILY MEDICINE

## 2025-03-06 PROCEDURE — 99213 OFFICE O/P EST LOW 20 MIN: CPT | Performed by: FAMILY MEDICINE

## 2025-03-06 RX ORDER — HYDROCHLOROTHIAZIDE 12.5 MG/1
TABLET ORAL
Qty: 90 TABLET | Refills: 3 | Status: SHIPPED | OUTPATIENT
Start: 2025-03-06

## 2025-03-06 NOTE — PROGRESS NOTES
Date of Encounter: 2025  Patient: Bernard Resendez,  1940    Subjective   History of Presenting Illness  Chief complaint: Leg swelling    Presents with lower extremity swelling.  He feels this is related to amlodipine.  He is starting to have a rash on his lower legs because of this.  He does have chronic shortness of breath but this has been unchanged. he was previously on hydrochlorothiazide which was stopped due to soft blood pressures.  He has not been taking furosemide regularly.  He is not having any chest pain.  Previous echocardiogram  did not demonstrated any systolic impairment    The following portions of the patient's history were reviewed and updated as appropriate: allergies, current medications, past family history, past medical history, past social history, past surgical history and problem list.    Patient Active Problem List   Diagnosis    Stage 3a chronic kidney disease    Hyperlipidemia    Hypogonadism in male    Insomnia d/t anxiety    Osteopenia    Hypertension    Colon polyps    Ulcerative colitis without complications    Obesity, Class I, BMI 30-34.9    DNR (do not resuscitate)    History of gout    Meningioma    Cerebrovascular accident (CVA) of pontine structure    Mild cognitive impairment    Hand cramps    Lung nodules    Chronic pain of left knee    Obstructive sleep apnea syndrome     Past Medical History:   Diagnosis Date    Acute CVA (cerebrovascular accident) 2022    Acute renal failure     Cancer     SKIN LESION REMOVED    Diverticulosis     Gout 2016    Hearing difficulty     History of shingles     Hyperlipidemia     Hypertension     Hypogonadism in male     Insomnia     Meningioma 2022    Nephrolithiasis     Obesity, Class I, BMI 30-34.9 2021    Osteopenia     Osteopenia     Pruritus 01/15/2021    Renal insufficiency     Ulcerative colitis     Venous stasis dermatitis of both lower extremities 01/15/2021     Past Surgical History:   Procedure  Laterality Date    CIRCUMCISION N/A 6/16/2021    Procedure: CIRCUMCISION;  Surgeon: Milton Forrest MD;  Location: [facility] MAIN OR;  Service: Urology;  Laterality: N/A;    COLONOSCOPY  06/11/2015    NBIH, actuve colitis w/cryptitis, rare crypt abscess & superficial erosion, hyperplastic polyp    COLONOSCOPY N/A 8/8/2017    IH, nodular mucosa in the proximal rectum and in the distal rectum, Diverticulosis in the sigmoid colon, the descending colon and at the splenic flexure.  PATH: Hyperplastic polyps     COLONOSCOPY N/A 11/19/2019    Procedure: COLONOSCOPY to cecum with biopsies;  Surgeon: Calvin Gifford MD;  Location: [facility] ENDOSCOPY;  Service: Gastroenterology    EYE SURGERY      cataract    KIDNEY STONE SURGERY      TUMOR EXCISION      rectal tumor     Family History   Problem Relation Age of Onset    Hypertension Mother     Stroke Mother     Heart disease Mother     Asthma Sister     Ulcerative colitis Sister     Emphysema Sister     Lung disease Father         mesothelioma    Glaucoma Father     Diabetes Son     Malig Hyperthermia Neg Hx        Current Outpatient Medications:     albuterol sulfate  (90 Base) MCG/ACT inhaler, Inhale 2 puffs Every 4 (Four) Hours As Needed for Wheezing., Disp: 6.7 g, Rfl: 3    atenolol (TENORMIN) 50 MG tablet, TAKE 1 TABLET BY MOUTH DAILY, Disp: 90 tablet, Rfl: 1    atorvastatin (LIPITOR) 20 MG tablet, TAKE THREE TABLETS BY MOUTH DAILY, Disp: 90 tablet, Rfl: 3    Cholecalciferol (VITAMIN D3) 1000 UNITS capsule, Take 1 capsule by mouth Daily., Disp: , Rfl:     clopidogrel (PLAVIX) 75 MG tablet, TAKE 1 TABLET BY MOUTH DAILY, Disp: 90 tablet, Rfl: 0    Cyanocobalamin (Vitamin B12) 1000 MCG tablet controlled-release, Take 1,000 mcg by mouth Daily., Disp: , Rfl:     Fluticasone-Umeclidin-Vilant (TRELEGY) 100-62.5-25 MCG/ACT inhaler, Inhale 1 puff Daily., Disp: 120 each, Rfl: 3    furosemide (Lasix) 40 MG tablet, Take 1 tablet by mouth Daily As Needed (swelling)., Disp: 90  "tablet, Rfl: 3    lisinopril (PRINIVIL,ZESTRIL) 40 MG tablet, TAKE 1 TABLET BY MOUTH DAILY, Disp: 90 tablet, Rfl: 3    Omega-3 Fatty Acids (FISH OIL) 1200 MG capsule capsule, Take 1 capsule by mouth Daily With Breakfast. HOLD PER MD INSTR, Disp: , Rfl:     sulfaSALAzine (AZULFIDINE) 500 MG tablet, Take 2 tablets by mouth 3 (Three) Times a Day., Disp: 180 tablet, Rfl: 5    vitamin C (ASCORBIC ACID) 500 MG tablet, Take 2 tablets by mouth Daily. HOLD PER MD INSTR, Disp: , Rfl:     zolpidem CR (AMBIEN CR) 6.25 MG CR tablet, Take 1 tablet by mouth At Night As Needed for Sleep., Disp: 90 tablet, Rfl: 1    hydroCHLOROthiazide 12.5 MG tablet, Take 1 tab PO QD for blood pressure, Disp: 90 tablet, Rfl: 3  Allergies   Allergen Reactions    Ciprofloxacin Rash     Pt unsure reaction     Social History     Tobacco Use    Smoking status: Former     Current packs/day: 0.00     Average packs/day: 0.3 packs/day for 20.0 years (5.0 ttl pk-yrs)     Types: Cigarettes     Start date:      Quit date:      Years since quittin.2     Passive exposure: Past    Smokeless tobacco: Former   Vaping Use    Vaping status: Never Used   Substance Use Topics    Alcohol use: Yes     Alcohol/week: 2.0 standard drinks of alcohol     Types: 2 Shots of liquor per week     Comment: socially    Drug use: No          Objective   Physical Exam  Vitals:    25 1023   BP: 124/68   BP Location: Left arm   Patient Position: Sitting   Cuff Size: Large Adult   Pulse: 68   Temp: 98 °F (36.7 °C)   TempSrc: Infrared   SpO2: 97%   Weight: 120 kg (264 lb)   Height: 175.3 cm (69\")     Body mass index is 38.99 kg/m².    Constitutional: NAD.  Cardiovascular: RRR. No murmurs. 2+ pitting edema of the lower legs bilaterally with mild venous stasis dermatitis  Pulmonary: CTA b/l. Good effort.  No basilar crackles.  Psychiatric: Normal affect. Normal thought content.     Assessment & Plan   Assessment and Plan  Pleasant 84-year-old male with hypertension, " hyperlipidemia, ulcerative colitis, CKD 3A, history of CVA, meningioma, osteopenia, history of gout, insomnia, among other problems, who presents with the following:      Diagnoses and all orders for this visit:    1. Primary hypertension (Primary)  -     hydroCHLOROthiazide 12.5 MG tablet; Take 1 tab PO QD for blood pressure  Dispense: 90 tablet; Refill: 3    Recommend we stop amlodipine and put him back on low-dose hydrochlorothiazide.  Will follow-up in 2 weeks for blood pressure recheck and I will also pop in at his edema to make sure that is improving.    Ector Sykes MD  Family Medicine  O: [phone]    Disclaimer: Parts of this note were dictated by speech recognition. Minor errors in transcription may be present. Please call if questions.

## 2025-03-12 DIAGNOSIS — I10 ESSENTIAL HYPERTENSION: ICD-10-CM

## 2025-03-12 RX ORDER — ATENOLOL 50 MG/1
50 TABLET ORAL DAILY
Qty: 90 TABLET | Refills: 1 | Status: SHIPPED | OUTPATIENT
Start: 2025-03-12

## 2025-03-20 ENCOUNTER — CLINICAL SUPPORT (OUTPATIENT)
Dept: INTERNAL MEDICINE | Facility: CLINIC | Age: 85
End: 2025-03-20
Payer: MEDICARE

## 2025-03-20 VITALS — DIASTOLIC BLOOD PRESSURE: 80 MMHG | SYSTOLIC BLOOD PRESSURE: 116 MMHG

## 2025-03-20 DIAGNOSIS — I10 PRIMARY HYPERTENSION: Primary | ICD-10-CM

## 2025-04-01 ENCOUNTER — TELEPHONE (OUTPATIENT)
Dept: GASTROENTEROLOGY | Facility: CLINIC | Age: 85
End: 2025-04-01

## 2025-04-01 ENCOUNTER — TELEPHONE (OUTPATIENT)
Dept: GASTROENTEROLOGY | Facility: CLINIC | Age: 85
End: 2025-04-01
Payer: MEDICARE

## 2025-04-02 ENCOUNTER — HOSPITAL ENCOUNTER (OUTPATIENT)
Dept: MRI IMAGING | Facility: HOSPITAL | Age: 85
Discharge: HOME | End: 2025-04-02
Admitting: PSYCHIATRY & NEUROLOGY
Payer: MEDICARE

## 2025-04-02 DIAGNOSIS — D32.0 MENINGIOMA, CEREBRAL: ICD-10-CM

## 2025-04-02 LAB — CREAT BLDA-MCNC: 1.9 MG/DL (ref 0.6–1.3)

## 2025-04-02 PROCEDURE — 82565 ASSAY OF CREATININE: CPT

## 2025-04-02 PROCEDURE — 70553 MRI BRAIN STEM W/O & W/DYE: CPT

## 2025-04-02 PROCEDURE — A9577 INJ MULTIHANCE: HCPCS | Performed by: PSYCHIATRY & NEUROLOGY

## 2025-04-02 PROCEDURE — 25510000002 GADOBENATE DIMEGLUMINE 529 MG/ML SOLUTION: Performed by: PSYCHIATRY & NEUROLOGY

## 2025-04-02 RX ADMIN — GADOBENATE DIMEGLUMINE 20 ML: 529 INJECTION, SOLUTION INTRAVENOUS at 14:31

## 2025-04-07 ENCOUNTER — RESULTS FOLLOW-UP (OUTPATIENT)
Dept: NEUROLOGY | Facility: CLINIC | Age: 85
End: 2025-04-07
Payer: MEDICARE

## 2025-04-11 ENCOUNTER — TELEPHONE (OUTPATIENT)
Dept: INTERNAL MEDICINE | Facility: CLINIC | Age: 85
End: 2025-04-11
Payer: MEDICARE

## 2025-04-11 DIAGNOSIS — E78.5 HYPERLIPIDEMIA, UNSPECIFIED HYPERLIPIDEMIA TYPE: ICD-10-CM

## 2025-04-11 NOTE — TELEPHONE ENCOUNTER
Rx Refill Note  Requested Prescriptions     Pending Prescriptions Disp Refills    atorvastatin (LIPITOR) 20 MG tablet [Pharmacy Med Name: ATORVASTATIN 20 MG TABLET] 90 tablet 0     Sig: TAKE THREE TABLETS BY MOUTH DAILY      Last office visit with prescribing clinician: 3/6/2025   Last telemedicine visit with prescribing clinician: Visit date not found   Next office visit with prescribing clinician: Visit date not found      HAS APPT W INTERNAL MED 6/13/2025        Lipid Panel (11/02/2023 13:29)     Charissa Medrano LPN  04/11/25, 09:55 EDT

## 2025-04-14 RX ORDER — ATORVASTATIN CALCIUM 20 MG/1
60 TABLET, FILM COATED ORAL DAILY
Qty: 90 TABLET | Refills: 0 | Status: SHIPPED | OUTPATIENT
Start: 2025-04-14 | End: 2025-04-18 | Stop reason: SDUPTHER

## 2025-04-18 DIAGNOSIS — E78.5 HYPERLIPIDEMIA, UNSPECIFIED HYPERLIPIDEMIA TYPE: ICD-10-CM

## 2025-04-18 RX ORDER — ATORVASTATIN CALCIUM 20 MG/1
60 TABLET, FILM COATED ORAL DAILY
Qty: 90 TABLET | Refills: 0 | Status: SHIPPED | OUTPATIENT
Start: 2025-04-18

## 2025-04-25 ENCOUNTER — EXTERNAL PBMM DATA (OUTPATIENT)
Dept: PHARMACY | Facility: OTHER | Age: 85
End: 2025-04-25
Payer: MEDICARE

## 2025-05-06 ENCOUNTER — EXTERNAL PBMM DATA (OUTPATIENT)
Dept: PHARMACY | Facility: OTHER | Age: 85
End: 2025-05-06
Payer: MEDICARE

## 2025-05-14 ENCOUNTER — POP HEALTH PHARMACY (OUTPATIENT)
Dept: PHARMACY | Facility: OTHER | Age: 85
End: 2025-05-14
Payer: MEDICARE

## 2025-05-14 NOTE — PROGRESS NOTES
[facility] Pharmacy Outreach      Bernard Resendez was called today to discuss medication adherence with ATORVASTATIN CALCIUM ([facility] COA REDUCTASE INHIBITORS) , as he was identified as having care opportunities.    Program Details    [facility]  Status: Enrolled  Effective Dates: 5/14/2025 - present  Responsible Staff: Jojo Melvin LPN          Opportunities Identified    Adherence- Cholesterol       Adherence and Medication Management    Adherence Questions   Patient Reported X Missed Doses in the Last 7 Days : 0  Reasons for Non-Adherence : No problems identified  Adherence Tools Used: Pill box  Interested in a 90 day supply? : No  Does this require clinical escalation to a pharmacist?: N         General Medication Management    Type of medication management: targeted medication review  Review Completed on: 5/14/25  Referred by: insurance group  Recipient: beneficiary  Provider: licensed practical nurse  Visit type: initial           Medication Therapy Problems     Medication Therapy Recommendations  No medication therapy recommendations to display      Summary  Introduced self, explained Pharmacy Outreach role and provided contact information. Verified patient name and date of birth. Discuss atorvastatin. He takes 3 of the 20 mg tablets every morning before 10am. No issues with getting medication from pharmacy.  Medication Management Summary    Topics discussed: adherence and missed doses discussed, reminder to refill or  medication discussed  Time spent: 61 - 75 min         Jojo Melvin LPN, 05/14/25, 2:21 PM EDT.

## 2025-05-16 ENCOUNTER — EXTERNAL PBMM DATA (OUTPATIENT)
Dept: PHARMACY | Facility: OTHER | Age: 85
End: 2025-05-16
Payer: MEDICARE

## 2025-05-27 ENCOUNTER — POP HEALTH PHARMACY (OUTPATIENT)
Dept: PHARMACY | Facility: OTHER | Age: 85
End: 2025-05-27
Payer: MEDICARE

## 2025-06-03 ENCOUNTER — EXTERNAL PBMM DATA (OUTPATIENT)
Dept: PHARMACY | Facility: OTHER | Age: 85
End: 2025-06-03
Payer: MEDICARE

## 2025-06-10 ENCOUNTER — HOSPITAL ENCOUNTER (OUTPATIENT)
Dept: CT IMAGING | Facility: HOSPITAL | Age: 85
Discharge: HOME | End: 2025-06-10
Admitting: INTERNAL MEDICINE
Payer: MEDICARE

## 2025-06-10 DIAGNOSIS — R91.8 LUNG NODULES: ICD-10-CM

## 2025-06-10 PROCEDURE — 71250 CT THORAX DX C-: CPT

## 2025-06-11 ENCOUNTER — POP HEALTH PHARMACY (OUTPATIENT)
Dept: PHARMACY | Facility: OTHER | Age: 85
End: 2025-06-11
Payer: MEDICARE

## 2025-06-13 ENCOUNTER — OFFICE VISIT (OUTPATIENT)
Dept: INTERNAL MEDICINE | Facility: CLINIC | Age: 85
End: 2025-06-13
Payer: MEDICARE

## 2025-06-13 ENCOUNTER — PATIENT ROUNDING (BHMG ONLY) (OUTPATIENT)
Dept: INTERNAL MEDICINE | Facility: CLINIC | Age: 85
End: 2025-06-13
Payer: MEDICARE

## 2025-06-13 VITALS
SYSTOLIC BLOOD PRESSURE: 132 MMHG | BODY MASS INDEX: 38.36 KG/M2 | HEIGHT: 69 IN | OXYGEN SATURATION: 95 % | DIASTOLIC BLOOD PRESSURE: 84 MMHG | HEART RATE: 65 BPM | WEIGHT: 259 LBS

## 2025-06-13 DIAGNOSIS — D64.9 ANEMIA, UNSPECIFIED TYPE: ICD-10-CM

## 2025-06-13 DIAGNOSIS — K51.90 ULCERATIVE COLITIS WITHOUT COMPLICATIONS, UNSPECIFIED LOCATION: ICD-10-CM

## 2025-06-13 DIAGNOSIS — I10 PRIMARY HYPERTENSION: ICD-10-CM

## 2025-06-13 DIAGNOSIS — G47.00 INSOMNIA, UNSPECIFIED TYPE: Primary | ICD-10-CM

## 2025-06-13 DIAGNOSIS — D32.9 MENINGIOMA: ICD-10-CM

## 2025-06-13 DIAGNOSIS — N18.31 STAGE 3A CHRONIC KIDNEY DISEASE: ICD-10-CM

## 2025-06-13 DIAGNOSIS — E78.5 HYPERLIPIDEMIA, UNSPECIFIED HYPERLIPIDEMIA TYPE: ICD-10-CM

## 2025-06-13 RX ORDER — AMLODIPINE BESYLATE 10 MG/1
TABLET ORAL
COMMUNITY
End: 2025-06-13

## 2025-06-13 RX ORDER — ZOLPIDEM TARTRATE 10 MG/1
10 TABLET ORAL NIGHTLY PRN
Qty: 90 TABLET | Refills: 0 | Status: SHIPPED | OUTPATIENT
Start: 2025-06-13

## 2025-06-13 NOTE — PROGRESS NOTES
"Chief Complaint  Establish Care, Hypertension, and Insomnia    Subjective        Bernard Resendez presents to [facility]  History of Present Illness    Presents to clinic today as a new patient to establish [facility], was previously following with Dr. Sykes    He has no specific acute complaints or concerns other than chronic insomnia.  He currently takes Ambien 6.25 mg and has been on Ambien for years however feels like he has had worsening sleep issues over the last few months.  He is interested in going up on the dose, he has not had any significant side effects from Ambien use in the past    He also reports some confusion regarding his blood pressure medication, he states he takes Lasix as needed for swelling. His PCP previously at last visit told him to stop amlodipine due to LE swelling and start hctz but he still had amlodipine on his med list      Objective   Vital Signs:  /84   Pulse 65   Ht 175.3 cm (69.02\")   Wt 117 kg (259 lb)   SpO2 95%   BMI 38.23 kg/m²   Estimated body mass index is 38.23 kg/m² as calculated from the following:    Height as of this encounter: 175.3 cm (69.02\").    Weight as of this encounter: 117 kg (259 lb).            Physical Exam  Vitals reviewed.   Constitutional:       General: He is not in acute distress.     Appearance: Normal appearance. He is not toxic-appearing.   HENT:      Head: Normocephalic and atraumatic.   Eyes:      General: No scleral icterus.     Conjunctiva/sclera: Conjunctivae normal.   Cardiovascular:      Comments: Venous stasis skin changes on distal calves and lower legs  Musculoskeletal:      Right lower le+ Pitting Edema present.      Left lower le+ Pitting Edema present.   Skin:     General: Skin is warm and dry.   Neurological:      Mental Status: He is alert and oriented to person, place, and time.   Psychiatric:         Mood and Affect: Mood normal.         Behavior: Behavior normal.        Result Review " :                Assessment and Plan   Diagnoses and all orders for this visit:    1. Insomnia, unspecified type (Primary)  -     zolpidem (AMBIEN) 10 MG tablet; Take 1 tablet by mouth At Night As Needed for Sleep.  Dispense: 90 tablet; Refill: 0    2. Primary hypertension  -     CBC (No Diff)  -     Comprehensive Metabolic Panel    3. Stage 3a chronic kidney disease    4. Hyperlipidemia, unspecified hyperlipidemia type    5. Anemia, unspecified type  -     CBC (No Diff)    6. Ulcerative colitis without complications, unspecified location    7. Meningioma      BLACK reviewed, discussed risks/benefits of ambien. Will increase to 10mg given duration of use and see if improves sleep. Continue fu with pulm for AMELIA which also is contributing    We reviewed his medication list and I advised him to bring in all of his medications at upcoming appointment.  Will remove amlodipine from his med list, continue hydrochlorothiazide and lisinopril.  It has been sometime since he has had labs and POC creatinine noted in April noted worsening creatinine so we will repeat labs today    I will plan to see him back in about 1 month for wellness visit with labs to be done today         Follow Up   Return in about 1 month (around 7/13/2025) for Medicare Wellness.  Patient was given instructions and counseling regarding his condition or for health maintenance advice. Please see specific information pulled into the AVS if appropriate.

## 2025-06-13 NOTE — PROGRESS NOTES
June 13, 2025    Hello, may I speak with Bernard Resendez?    My name is GILMA       I am  with [facility]  [address]  [facility] [id number].    Before we get started may I verify your date of birth? 1940    I am calling to officially welcome you to our practice and ask about your recent visit. Is this a good time to talk? yes    Tell me about your visit with us. What things went well?   [facility]        We're always looking for ways to make our patients' experiences even better. Do you have recommendations on ways we may improve?  no    Overall were you satisfied with your first visit to our practice? yes       I appreciate you taking the time to speak with me today. Is there anything else I can do for you? no      Thank you, and have a great day.       12

## 2025-06-14 LAB
ALBUMIN SERPL-MCNC: 4.1 G/DL (ref 3.5–5.2)
ALBUMIN/GLOB SERPL: 1.9 G/DL
ALP SERPL-CCNC: 88 U/L (ref 39–117)
ALT SERPL-CCNC: 16 U/L (ref 1–41)
AST SERPL-CCNC: 28 U/L (ref 1–40)
BILIRUB SERPL-MCNC: 0.3 MG/DL (ref 0–1.2)
BUN SERPL-MCNC: 30 MG/DL (ref 8–23)
BUN/CREAT SERPL: 19.6 (ref 7–25)
CALCIUM SERPL-MCNC: 8.8 MG/DL (ref 8.6–10.5)
CHLORIDE SERPL-SCNC: 106 MMOL/L (ref 98–107)
CO2 SERPL-SCNC: 21 MMOL/L (ref 22–29)
CREAT SERPL-MCNC: 1.53 MG/DL (ref 0.76–1.27)
EGFRCR SERPLBLD CKD-EPI 2021: 43.7 ML/MIN/1.73
ERYTHROCYTE [DISTWIDTH] IN BLOOD BY AUTOMATED COUNT: 13.7 % (ref 12.3–15.4)
GLOBULIN SER CALC-MCNC: 2.2 GM/DL
GLUCOSE SERPL-MCNC: 90 MG/DL (ref 65–99)
HCT VFR BLD AUTO: 39 % (ref 37.5–51)
HGB BLD-MCNC: 12.8 G/DL (ref 13–17.7)
MCH RBC QN AUTO: 30.2 PG (ref 26.6–33)
MCHC RBC AUTO-ENTMCNC: 32.8 G/DL (ref 31.5–35.7)
MCV RBC AUTO: 92 FL (ref 79–97)
PLATELET # BLD AUTO: 210 10*3/MM3 (ref 140–450)
POTASSIUM SERPL-SCNC: 4.9 MMOL/L (ref 3.5–5.2)
PROT SERPL-MCNC: 6.3 G/DL (ref 6–8.5)
RBC # BLD AUTO: 4.24 10*6/MM3 (ref 4.14–5.8)
SODIUM SERPL-SCNC: 141 MMOL/L (ref 136–145)
WBC # BLD AUTO: 8.66 10*3/MM3 (ref 3.4–10.8)

## 2025-06-17 ENCOUNTER — EXTERNAL PBMM DATA (OUTPATIENT)
Dept: PHARMACY | Facility: OTHER | Age: 85
End: 2025-06-17
Payer: MEDICARE

## 2025-06-17 ENCOUNTER — POP HEALTH PHARMACY (OUTPATIENT)
Dept: PHARMACY | Facility: OTHER | Age: 85
End: 2025-06-17
Payer: MEDICARE

## 2025-06-20 ENCOUNTER — TELEPHONE (OUTPATIENT)
Dept: INTERNAL MEDICINE | Facility: CLINIC | Age: 85
End: 2025-06-20

## 2025-06-20 NOTE — TELEPHONE ENCOUNTER
Caller: Bernard Resendez    Relationship: Self    Best call back number: [phone]     Requested Prescriptions: atorvastatin (LIPITOR) 20 MG tablet   Requested Prescriptions      No prescriptions requested or ordered in this encounter        Pharmacy where request should be sent:    [facility] [id number] [facility] [id number] [facility] [address] - [phone]  - [phone]   Last office visit with prescribing clinician: 6/13/2025   Last telemedicine visit with prescribing clinician: Visit date not found   Next office visit with prescribing clinician: 7/22/2025     Additional details provided by patient: PATIENT STATES HE IS COMPLETELY OUT OF THE ABOVE MEDICATION.  HE STATES HE HAS NOT HAD THE MEDICATION FOR ABOUT 2 YEARS.  HE IS WANTING TO KNOW IF HE IS SUPPOSE TO BE TAKING THE ATORVASTATIN.  IF SO HE WOULD LIKE A NEW 90 DAY PRESCRIPTION WITH REFILLS.    Does the patient have less than a 3 day supply:  [] Yes  [] No    Would you like a call back once the refill request has been completed: [] Yes [] No    If the office needs to give you a call back, can they leave a voicemail: [] Yes [] No    Tio Benitez Rep   06/20/25 08:58 EDT PLEASE ADVISE.    PLEASE ADVISE.

## 2025-06-23 DIAGNOSIS — E78.5 HYPERLIPIDEMIA, UNSPECIFIED HYPERLIPIDEMIA TYPE: ICD-10-CM

## 2025-06-23 RX ORDER — ATORVASTATIN CALCIUM 20 MG/1
60 TABLET, FILM COATED ORAL DAILY
Qty: 90 TABLET | Refills: 2 | Status: SHIPPED | OUTPATIENT
Start: 2025-06-23

## 2025-07-03 ENCOUNTER — OFFICE VISIT (OUTPATIENT)
Dept: GASTROENTEROLOGY | Facility: CLINIC | Age: 85
End: 2025-07-03
Payer: MEDICARE

## 2025-07-03 VITALS
WEIGHT: 254.7 LBS | HEIGHT: 70 IN | DIASTOLIC BLOOD PRESSURE: 72 MMHG | TEMPERATURE: 98.2 F | SYSTOLIC BLOOD PRESSURE: 110 MMHG | BODY MASS INDEX: 36.46 KG/M2 | HEART RATE: 64 BPM

## 2025-07-03 DIAGNOSIS — K51.90 ULCERATIVE COLITIS WITHOUT COMPLICATIONS, UNSPECIFIED LOCATION: Primary | ICD-10-CM

## 2025-07-03 PROCEDURE — 99203 OFFICE O/P NEW LOW 30 MIN: CPT | Performed by: PHYSICIAN ASSISTANT

## 2025-07-03 PROCEDURE — 1160F RVW MEDS BY RX/DR IN RCRD: CPT | Performed by: PHYSICIAN ASSISTANT

## 2025-07-03 PROCEDURE — 1159F MED LIST DOCD IN RCRD: CPT | Performed by: PHYSICIAN ASSISTANT

## 2025-07-03 NOTE — PROGRESS NOTES
"Chief Complaint  Ulcerative rectosigmoiditis with rectal bleeding    Subjective          History Of Present Illness:    Bernard Resendez is a  85 y.o. male patient of Dr. Gifford who presents as a new over 3-year patient for management of ulcerative colitis.    Patient was last seen about 4 years ago.  He denies any changes since then.  He is not experiencing any abdominal pain, nausea, vomiting, diarrhea, constipation.  He does occasionally have diarrhea that is likely food related.  He denies any melena or hematochezia.  He is currently sulfasalazine 2 tablets twice a day.  Does endorse weight gain.  Appetite is unchanged.  Patient denies any extraintestinal manifestations such as joint pain, rash, eye pain, eye redness, infection.    Additional data reviewed:  COLONOSCOPY (11/19/2019 13:00) -diverticulosis, otherwise normal.    Objective   Vital Signs:   /72   Pulse 64   Temp 98.2 °F (36.8 °C)   Ht 177.8 cm (70\")   Wt 116 kg (254 lb 11.2 oz)   BMI 36.55 kg/m²       Physical Exam  Vitals reviewed.   Constitutional:       General: He is not in acute distress.     Appearance: Normal appearance. He is not ill-appearing.   HENT:      Head: Normocephalic and atraumatic.      Nose: Nose normal.      Mouth/Throat:      Pharynx: Oropharynx is clear.   Eyes:      Conjunctiva/sclera: Conjunctivae normal.   Pulmonary:      Effort: Pulmonary effort is normal.   Abdominal:      General: There is no distension.      Palpations: Abdomen is soft. There is no mass.      Tenderness: There is no abdominal tenderness.   Musculoskeletal:         General: No swelling. Normal range of motion.      Cervical back: Normal range of motion.   Skin:     General: Skin is warm and dry.      Findings: No bruising or rash.   Neurological:      General: No focal deficit present.      Mental Status: He is alert and oriented to person, place, and time.      Motor: No weakness.      Gait: Gait normal.   Psychiatric:         Mood and Affect: " Mood normal.          Result Review :   The following data was reviewed by: Beulah Morgan PA-C on 07/03/2025:  CMP          2/26/2025    13:32 4/2/2025    14:05 6/13/2025    14:15   CMP   Glucose   90    BUN   30.0    Creatinine  1.90  1.53    EGFR   43.7    Sodium   141    Potassium   4.9    Chloride   106    Calcium   8.8    Total Protein 6.0   6.3    Albumin 3.2   4.1    Globulin 2.8   2.2    Total Bilirubin   0.3    Alkaline Phosphatase   88    AST (SGOT)   28    ALT (SGPT)   16    Albumin/Globulin Ratio 1.1   1.9    BUN/Creatinine Ratio   19.6      CBC          6/13/2025    14:15   CBC   WBC 8.66    RBC 4.24    Hemoglobin 12.8    Hematocrit 39.0    MCV 92.0    MCH 30.2    MCHC 32.8    RDW 13.7    Platelets 210            Assessment and Plan    Diagnoses and all orders for this visit:    1. Ulcerative colitis without complications, unspecified location (Primary)  Overview:  Added automatically from request for surgery [id number]         Continue sulfasalazine 2 tablets twice daily.  In the setting of patient's age his repeat colon cancer colon cancer screenings have been discontinued.  Patient has been in remission for many years reassuringly.    Follow Up   Return in about 1 year (around 7/3/2026) for Beulah Mary PA-C.    Dragon dictation used throughout this note.            Beulah Mary PA-C   [facility]  [address] [place] [address]  Office: [phone]

## 2025-07-10 ENCOUNTER — POP HEALTH PHARMACY (OUTPATIENT)
Dept: PHARMACY | Facility: OTHER | Age: 85
End: 2025-07-10
Payer: MEDICARE

## 2025-07-22 ENCOUNTER — OFFICE VISIT (OUTPATIENT)
Dept: INTERNAL MEDICINE | Facility: CLINIC | Age: 85
End: 2025-07-22
Payer: MEDICARE

## 2025-07-22 VITALS
SYSTOLIC BLOOD PRESSURE: 136 MMHG | WEIGHT: 255 LBS | HEIGHT: 70 IN | OXYGEN SATURATION: 95 % | BODY MASS INDEX: 36.51 KG/M2 | HEART RATE: 67 BPM | DIASTOLIC BLOOD PRESSURE: 80 MMHG

## 2025-07-22 DIAGNOSIS — G47.00 INSOMNIA, UNSPECIFIED TYPE: ICD-10-CM

## 2025-07-22 DIAGNOSIS — D64.9 ANEMIA, UNSPECIFIED TYPE: ICD-10-CM

## 2025-07-22 DIAGNOSIS — N18.31 STAGE 3A CHRONIC KIDNEY DISEASE: ICD-10-CM

## 2025-07-22 DIAGNOSIS — I10 PRIMARY HYPERTENSION: ICD-10-CM

## 2025-07-22 DIAGNOSIS — Z87.39 HISTORY OF GOUT: ICD-10-CM

## 2025-07-22 DIAGNOSIS — E78.5 HYPERLIPIDEMIA, UNSPECIFIED HYPERLIPIDEMIA TYPE: ICD-10-CM

## 2025-07-22 DIAGNOSIS — Z00.00 MEDICARE ANNUAL WELLNESS VISIT, SUBSEQUENT: Primary | ICD-10-CM

## 2025-07-22 PROCEDURE — 1170F FXNL STATUS ASSESSED: CPT | Performed by: STUDENT IN AN ORGANIZED HEALTH CARE EDUCATION/TRAINING PROGRAM

## 2025-07-22 PROCEDURE — 1126F AMNT PAIN NOTED NONE PRSNT: CPT | Performed by: STUDENT IN AN ORGANIZED HEALTH CARE EDUCATION/TRAINING PROGRAM

## 2025-07-22 PROCEDURE — 99214 OFFICE O/P EST MOD 30 MIN: CPT | Performed by: STUDENT IN AN ORGANIZED HEALTH CARE EDUCATION/TRAINING PROGRAM

## 2025-07-22 PROCEDURE — 1159F MED LIST DOCD IN RCRD: CPT | Performed by: STUDENT IN AN ORGANIZED HEALTH CARE EDUCATION/TRAINING PROGRAM

## 2025-07-22 PROCEDURE — G0439 PPPS, SUBSEQ VISIT: HCPCS | Performed by: STUDENT IN AN ORGANIZED HEALTH CARE EDUCATION/TRAINING PROGRAM

## 2025-07-22 PROCEDURE — 1160F RVW MEDS BY RX/DR IN RCRD: CPT | Performed by: STUDENT IN AN ORGANIZED HEALTH CARE EDUCATION/TRAINING PROGRAM

## 2025-07-22 NOTE — PROGRESS NOTES
Subjective   The ABCs of the Annual Wellness Visit  Medicare Wellness Visit      Bernard Resendez is a 85 y.o. patient who presents for a Medicare Wellness Visit.    The following portions of the patient's history were reviewed and   updated as appropriate: allergies, current medications, past family history, past medical history, past social history, past surgical history, and problem list.    Compared to one year ago, the patient's physical   health is the same.  Compared to one year ago, the patient's mental   health is the same.    Recent Hospitalizations:  He was not admitted to the hospital during the last year.     Current Medical Providers:  Patient Care Team:  Abdirahman Olivier MD as PCP - General (Internal Medicine)    Outpatient Medications Prior to Visit   Medication Sig Dispense Refill    atenolol (TENORMIN) 50 MG tablet TAKE 1 TABLET BY MOUTH DAILY 90 tablet 1    atorvastatin (LIPITOR) 20 MG tablet Take 3 tablets by mouth Daily. 90 tablet 2    Cholecalciferol (VITAMIN D3) 1000 UNITS capsule Take 1 capsule by mouth Daily.      clopidogrel (PLAVIX) 75 MG tablet TAKE 1 TABLET BY MOUTH DAILY 90 tablet 0    Cyanocobalamin (Vitamin B12) 1000 MCG tablet controlled-release Take 1,000 mcg by mouth Daily.      hydroCHLOROthiazide 12.5 MG tablet Take 1 tab PO QD for blood pressure 90 tablet 3    lisinopril (PRINIVIL,ZESTRIL) 40 MG tablet TAKE 1 TABLET BY MOUTH DAILY 90 tablet 3    Omega-3 Fatty Acids (FISH OIL) 1200 MG capsule capsule Take 1 capsule by mouth Daily With Breakfast. HOLD PER MD INSTR      sulfaSALAzine (AZULFIDINE) 500 MG tablet Take 2 tablets by mouth 3 (Three) Times a Day. 180 tablet 5    vitamin C (ASCORBIC ACID) 500 MG tablet Take 2 tablets by mouth Daily. HOLD PER MD INSTR      zolpidem (AMBIEN) 10 MG tablet Take 1 tablet by mouth At Night As Needed for Sleep. 90 tablet 0     No facility-administered medications prior to visit.     No opioid medication identified on active medication list. I have  "reviewed chart for other potential  high risk medication/s and harmful drug interactions in the elderly.      Aspirin is not on active medication list.  Aspirin use is contraindicated for this patient due to: current use of clopidogrel.  .    Patient Active Problem List   Diagnosis    Stage 3a chronic kidney disease    Hyperlipidemia    Hypogonadism in male    Insomnia d/t anxiety    Osteopenia    Hypertension    Colon polyps    Ulcerative colitis without complications    Obesity, Class I, BMI 30-34.9    DNR (do not resuscitate)    History of gout    Meningioma    Cerebrovascular accident (CVA) of pontine structure    Mild cognitive impairment    Hand cramps    Lung nodules    Chronic pain of left knee    Obstructive sleep apnea syndrome     Advance Care Planning Advance Directive is not on file.  ACP discussion was held with the patient during this visit. Patient has an advance directive (not in EMR), copy requested.            Objective   Vitals:    25 1123   BP: 136/80   Pulse: 67   SpO2: 95%   Weight: 116 kg (255 lb)   Height: 177.8 cm (70\")       Estimated body mass index is 36.59 kg/m² as calculated from the following:    Height as of this encounter: 177.8 cm (70\").    Weight as of this encounter: 116 kg (255 lb).                Does the patient have evidence of cognitive impairment? No                                                                                                Health  Risk Assessment    Smoking Status:  Social History     Tobacco Use   Smoking Status Former    Current packs/day: 0.00    Average packs/day: 0.3 packs/day for 20.0 years (5.0 ttl pk-yrs)    Types: Cigarettes    Start date:     Quit date:     Years since quittin.5    Passive exposure: Past   Smokeless Tobacco Former     Alcohol Consumption:  Social History     Substance and Sexual Activity   Alcohol Use Yes    Alcohol/week: 2.0 standard drinks of alcohol    Types: 2 Shots of liquor per week    Comment: " socially       Fall Risk Screen  STEADI Fall Risk Assessment was completed, and patient is at MODERATE risk for falls. Assessment completed on:2025    Depression Screening   Little interest or pleasure in doing things? Not at all   Feeling down, depressed, or hopeless? Not at all   PHQ-2 Total Score 0      Health Habits and Functional and Cognitive Screenin/22/2025    11:25 AM   Functional & Cognitive Status   Do you have difficulty preparing food and eating? No   Do you have difficulty bathing yourself, getting dressed or grooming yourself? No   Do you have difficulty using the toilet? No   Do you have difficulty moving around from place to place? No   Do you have trouble with steps or getting out of a bed or a chair? No   Current Diet Well Balanced Diet   Dental Exam Up to date   Eye Exam Up to date   Exercise (times per week) 0 times per week   Current Exercises Include No Regular Exercise   Do you need help using the phone?  No   Are you deaf or do you have serious difficulty hearing?  No   Do you need help to go to places out of walking distance? No   Do you need help shopping? No   Do you need help preparing meals?  No   Do you need help with housework?  No   Do you need help with laundry? No   Do you need help taking your medications? No   Do you need help managing money? No   Do you ever drive or ride in a car without wearing a seat belt? No   Have you felt unusual fatigue (could be tiredness), stress, anger or loneliness in the last month? No   Who do you live with? Spouse   If you need help, do you have trouble finding someone available to you? No   Have you been bothered in the last four weeks by sexual problems? No   Do you have difficulty concentrating, remembering or making decisions? No           Age-appropriate Screening Schedule:  Refer to the list below for future screening recommendations based on patient's age, sex and/or medical conditions. Orders for these recommended tests are  "listed in the plan section. The patient has been provided with a written plan.    Health Maintenance List  Health Maintenance   Topic Date Due    TDAP/TD VACCINES (1 - Tdap) Never done    ANNUAL WELLNESS VISIT  11/17/2023    COVID-19 Vaccine (4 - 2024-25 season) 09/01/2024    LIPID PANEL  11/02/2024    INFLUENZA VACCINE  10/01/2025    RSV Vaccine - Adults  Completed    ZOSTER VACCINE  Completed    Pneumococcal Vaccine 50+  Discontinued    COLORECTAL CANCER SCREENING  Discontinued                                                                                                                                                CMS Preventative Services Quick Reference  Risk Factors Identified During Encounter  Fall Risk-High or Moderate: Discussed Fall Prevention in the home  Immunizations Discussed/Encouraged: Tdap and COVID19  Dental Screening Recommended  Vision Screening Recommended    The above risks/problems have been discussed with the patient.  Pertinent information has been shared with the patient in the After Visit Summary.  An After Visit Summary and PPPS were made available to the patient.    Follow Up:   Next Medicare Wellness visit to be scheduled in 1 year.         Additional E&M Note during same encounter follows:  Patient has additional, significant, and separately identifiable condition(s)/problem(s) that require work above and beyond the Medicare Wellness Visit     Chief Complaint  Medicare Wellness-subsequent    Subjective   HPI  Bernard is also being seen today for additional medical problem/s.    He reports he has had some pain in his right big toe, worse and largely only present at night. Denies any trauma to it, has had some swelling but this is present in his legs from edema and lasix improves it    Otherwise reports insomnia has improved with ambien 10mg                    Objective   Vital Signs:  /80   Pulse 67   Ht 177.8 cm (70\")   Wt 116 kg (255 lb)   SpO2 95%   BMI 36.59 kg/m² "   Physical Exam  Vitals reviewed.   Constitutional:       General: He is not in acute distress.     Appearance: Normal appearance. He is not toxic-appearing.   HENT:      Head: Normocephalic and atraumatic.   Eyes:      General: No scleral icterus.     Conjunctiva/sclera: Conjunctivae normal.   Cardiovascular:      Rate and Rhythm: Normal rate and regular rhythm.      Heart sounds: Normal heart sounds.   Pulmonary:      Effort: Pulmonary effort is normal. No respiratory distress.      Breath sounds: No stridor. No wheezing.   Musculoskeletal:      Right lower le+ Edema present.      Left lower le+ Edema present.      Right foot: Swelling present.      Comments: 1st MTP joint swelling with some surrounding erythema with non-tender to palpation, or warmth to touch    Skin:     General: Skin is warm and dry.   Neurological:      Mental Status: He is alert and oriented to person, place, and time.   Psychiatric:         Mood and Affect: Mood normal.         Behavior: Behavior normal.              Assessment and Plan         Medicare annual wellness visit, subsequent    Insomnia, unspecified type    Primary hypertension    Stage 3a chronic kidney disease    Anemia, unspecified type    Hyperlipidemia, unspecified hyperlipidemia type     History of gout    Diagnoses and all orders for this visit:    1. Medicare annual wellness visit, subsequent (Primary)    2. Insomnia, unspecified type    3. Primary hypertension  -     Comprehensive Metabolic Panel; Future    4. Stage 3a chronic kidney disease  -     Comprehensive Metabolic Panel; Future    5. Anemia, unspecified type  -     CBC & Differential; Future  -     Ferritin; Future  -     Iron Profile w/o Ferritin; Future  -     Vitamin B12 & Folate; Future    6. Hyperlipidemia, unspecified hyperlipidemia type  -     Comprehensive Metabolic Panel; Future  -     Lipid Panel With / Chol / HDL Ratio; Future    7. History of gout       Clinically stable chronic conditions  listed above    Insomnia improved on ambien 10mg, no adverse side effects    BP okay today, continue current regimen    Anemia is chronic, suspect this is his baseline and likely multifactorial will repeat prior to next appt    I do suspect his right toe pain is more OA but given location and remote hx of gout, will keep an eye on this. Offered imaging but he declined and will let me know if he wants to pursue     RTC in 6mo, labs prior               Follow Up   Return in about 6 months (around 1/22/2026) for Lab before FU.  Patient was given instructions and counseling regarding his condition or for health maintenance advice. Please see specific information pulled into the AVS if appropriate.

## 2025-07-30 ENCOUNTER — TELEPHONE (OUTPATIENT)
Dept: INTERNAL MEDICINE | Facility: CLINIC | Age: 85
End: 2025-07-30

## 2025-07-30 DIAGNOSIS — M79.672 PAIN IN BOTH FEET: ICD-10-CM

## 2025-07-30 DIAGNOSIS — M79.671 PAIN IN BOTH FEET: ICD-10-CM

## 2025-07-30 DIAGNOSIS — I87.2 VENOUS STASIS DERMATITIS: ICD-10-CM

## 2025-07-30 DIAGNOSIS — Z87.39 HISTORY OF GOUT: Primary | ICD-10-CM

## 2025-08-13 ENCOUNTER — OFFICE VISIT (OUTPATIENT)
Dept: INTERNAL MEDICINE | Facility: CLINIC | Age: 85
End: 2025-08-13
Payer: MEDICARE

## 2025-08-13 ENCOUNTER — HOSPITAL ENCOUNTER (OUTPATIENT)
Facility: HOSPITAL | Age: 85
Discharge: HOME | End: 2025-08-13
Admitting: STUDENT IN AN ORGANIZED HEALTH CARE EDUCATION/TRAINING PROGRAM
Payer: MEDICARE

## 2025-08-13 VITALS
BODY MASS INDEX: 36.94 KG/M2 | HEIGHT: 70 IN | DIASTOLIC BLOOD PRESSURE: 82 MMHG | SYSTOLIC BLOOD PRESSURE: 141 MMHG | OXYGEN SATURATION: 94 % | WEIGHT: 258 LBS | HEART RATE: 74 BPM

## 2025-08-13 DIAGNOSIS — M53.86 SCIATICA OF LEFT SIDE ASSOCIATED WITH DISORDER OF LUMBAR SPINE: Primary | ICD-10-CM

## 2025-08-13 PROCEDURE — 72100 X-RAY EXAM L-S SPINE 2/3 VWS: CPT

## 2025-08-13 RX ORDER — PREDNISONE 10 MG/1
TABLET ORAL
Qty: 30 TABLET | Refills: 0 | Status: SHIPPED | OUTPATIENT
Start: 2025-08-13 | End: 2025-08-23

## 2025-08-13 RX ORDER — FUROSEMIDE 40 MG/1
TABLET ORAL
COMMUNITY

## 2025-08-13 RX ORDER — BACLOFEN 10 MG/1
TABLET ORAL
COMMUNITY
Start: 2025-07-27

## 2025-08-13 RX ORDER — AMLODIPINE BESYLATE 10 MG/1
TABLET ORAL
COMMUNITY

## 2025-08-18 DIAGNOSIS — M81.0 OSTEOPOROSIS WITHOUT CURRENT PATHOLOGICAL FRACTURE, UNSPECIFIED OSTEOPOROSIS TYPE: ICD-10-CM

## 2025-08-18 DIAGNOSIS — M53.86 SCIATICA OF LEFT SIDE ASSOCIATED WITH DISORDER OF LUMBAR SPINE: Primary | ICD-10-CM

## 2025-08-24 DIAGNOSIS — G47.00 INSOMNIA, UNSPECIFIED TYPE: ICD-10-CM

## 2025-08-25 RX ORDER — ZOLPIDEM TARTRATE 10 MG/1
TABLET ORAL
Qty: 90 TABLET | OUTPATIENT
Start: 2025-08-25

## (undated) DEVICE — GLV SURG BIOGEL LTX PF 7 1/2

## (undated) DEVICE — SUT GUT CHRM 3/0 PS2 27IN 1638H

## (undated) DEVICE — SOL BETADINE 4OZ

## (undated) DEVICE — SYR LL TP 10ML STRL

## (undated) DEVICE — SPK PIN NSR FLUID NONVNT 2WY MINI LL LF

## (undated) DEVICE — SINGLE-USE BIOPSY FORCEPS: Brand: RADIAL JAW 4

## (undated) DEVICE — SKIN PREP TRAY W/CHG: Brand: MEDLINE INDUSTRIES, INC.

## (undated) DEVICE — TUBING, SUCTION, 1/4" X 10', STRAIGHT: Brand: MEDLINE

## (undated) DEVICE — STANDARD HYPODERMIC NEEDLE,POLYPROPYLENE HUB: Brand: MONOJECT

## (undated) DEVICE — SENSR O2 OXIMAX FNGR A/ 18IN NONSTR

## (undated) DEVICE — Device: Brand: DEFENDO AIR/WATER/SUCTION AND BIOPSY VALVE

## (undated) DEVICE — PENCL ES MEGADINE EZ/CLEAN BUTN W/HOLSTR 10FT

## (undated) DEVICE — LOU MINOR PROCEDURE: Brand: MEDLINE INDUSTRIES, INC.

## (undated) DEVICE — TBG 02 CRUSH RESIST LF CLR 7FT

## (undated) DEVICE — BNDG GZ SOF-FORM CONFRM 2X75IN LF STRL

## (undated) DEVICE — PANTY KNIT WASHABLE LG/XL BRN/GRN LF

## (undated) DEVICE — THE TORRENT IRRIGATION SCOPE CONNECTOR IS USED WITH THE TORRENT IRRIGATION TUBING TO PROVIDE IRRIGATION FLUIDS SUCH AS STERILE WATER DURING GASTROINTESTINAL ENDOSCOPIC PROCEDURES WHEN USED IN CONJUNCTION WITH AN IRRIGATION PUMP (OR ELECTROSURGICAL UNIT).: Brand: TORRENT

## (undated) DEVICE — CANN NASL CO2 TRULINK W/O2 A/

## (undated) DEVICE — ELECTRD NDL EZ CLN STD 2.75IN

## (undated) DEVICE — KT VLV BIOGUARD SXN BIOP AIR/H20 CONN 4PC DISP

## (undated) DEVICE — SUT GUT CHRM 4/0 PS2 18IN 1637G

## (undated) DEVICE — BNDG ELAS CO-FLEX SLF ADHR 2IN 5YD LF STRL